# Patient Record
Sex: FEMALE | Race: WHITE | NOT HISPANIC OR LATINO | Employment: OTHER | URBAN - METROPOLITAN AREA
[De-identification: names, ages, dates, MRNs, and addresses within clinical notes are randomized per-mention and may not be internally consistent; named-entity substitution may affect disease eponyms.]

---

## 2017-01-13 ENCOUNTER — CONVERSION ENCOUNTER (OUTPATIENT)
Dept: RHEUMATOLOGY | Facility: CLINIC | Age: 57
End: 2017-01-13

## 2017-01-20 ENCOUNTER — CONVERSION ENCOUNTER (OUTPATIENT)
Dept: RHEUMATOLOGY | Facility: CLINIC | Age: 57
End: 2017-01-20

## 2017-01-20 LAB
ALBUMIN SERPL-MCNC: 3.9 G/DL (ref 3.6–5.1)
ALBUMIN/GLOB SERPL: ABNORMAL {RATIO} (ref 1–2.5)
ALP SERPL-CCNC: 99 UNITS/L (ref 33–130)
ALT SERPL-CCNC: 8 UNITS/L (ref 6–29)
AST SERPL-CCNC: 16 UNITS/L (ref 10–35)
BILIRUB SERPL-MCNC: 0.3 MG/DL (ref 0.2–1.2)
BUN SERPL-MCNC: 12 MG/DL (ref 7–25)
BUN/CREAT SERPL: ABNORMAL (ref 6–22)
CALCIUM SERPL-MCNC: 9.6 MG/DL (ref 8.6–10.4)
CHLORIDE SERPL-SCNC: 104 MMOL/L (ref 98–110)
CO2 CONTENT VENOUS: 24 MMOL/L (ref 20–31)
CONV TOTAL PROTEIN: 7.5 G/DL (ref 6.1–8.1)
CREAT UR-MCNC: 0.88 MG/DL (ref 0.5–1.05)
CRP SERPL-MCNC: 2.91 MG/DL
CYCLIC CITRULLINATED PEPTIDE ANTIBODY: ABNORMAL
ERYTHROCYTE [DISTWIDTH] IN BLOOD BY AUTOMATED COUNT: 16.1 % (ref 11–15)
ERYTHROCYTE [SEDIMENTATION RATE] IN BLOOD BY WESTERGREN METHOD: 36 MM/HR
GLOBULIN UR ELPH-MCNC: ABNORMAL G/DL (ref 1.9–3.7)
GLUCOSE SERPL-MCNC: 126 MG/DL (ref 65–99)
HCT VFR BLD AUTO: 43.7 % (ref 35–45)
HGB BLD-MCNC: 14.3 G/DL (ref 11.7–15.5)
MCH RBC QN AUTO: 27.2 PG (ref 27–33)
MCHC RBC AUTO-ENTMCNC: ABNORMAL % (ref 32–36)
MCV RBC AUTO: 83.3 FL (ref 80–100)
PLATELET # BLD AUTO: ABNORMAL 10*3/MM3 (ref 140–400)
PMV BLD AUTO: 9.3 FL (ref 7.5–11.5)
POTASSIUM SERPL-SCNC: 4.2 MMOL/L (ref 3.5–5.3)
RBC # BLD AUTO: ABNORMAL 10*6/MM3 (ref 3.8–5.1)
SODIUM SERPL-SCNC: 138 MMOL/L (ref 135–146)
WBC # BLD AUTO: ABNORMAL K/UL (ref 3.8–10.8)

## 2017-01-22 ENCOUNTER — APPOINTMENT (OUTPATIENT)
Dept: GENERAL RADIOLOGY | Facility: HOSPITAL | Age: 57
End: 2017-01-22

## 2017-01-22 ENCOUNTER — HOSPITAL ENCOUNTER (INPATIENT)
Facility: HOSPITAL | Age: 57
LOS: 5 days | Discharge: HOME | End: 2017-01-27
Attending: FAMILY MEDICINE | Admitting: INTERNAL MEDICINE

## 2017-01-22 DIAGNOSIS — I50.1 PULMONARY EDEMA CARDIAC CAUSE (HCC): Primary | ICD-10-CM

## 2017-01-22 LAB
ACETONE BLD QL: NEGATIVE
ALBUMIN SERPL-MCNC: 3.6 G/DL (ref 3.5–5.2)
ALBUMIN SERPL-MCNC: 3.6 G/DL (ref 3.5–5.2)
ALBUMIN SERPL-MCNC: 3.7 G/DL (ref 3.5–5.2)
ALBUMIN/GLOB SERPL: 0.9 G/DL
ALBUMIN/GLOB SERPL: 1 G/DL
ALP SERPL-CCNC: 101 U/L (ref 39–117)
ALP SERPL-CCNC: 104 U/L (ref 39–117)
ALT SERPL W P-5'-P-CCNC: 15 U/L (ref 1–33)
ALT SERPL W P-5'-P-CCNC: 18 U/L (ref 1–33)
ANION GAP SERPL CALCULATED.3IONS-SCNC: 15.2 MMOL/L
ANION GAP SERPL CALCULATED.3IONS-SCNC: 15.2 MMOL/L
ANION GAP SERPL CALCULATED.3IONS-SCNC: 21.6 MMOL/L
ARTERIAL PATENCY WRIST A: POSITIVE
ARTERIAL PATENCY WRIST A: POSITIVE
AST SERPL-CCNC: 30 U/L (ref 1–32)
AST SERPL-CCNC: 44 U/L (ref 1–32)
ATMOSPHERIC PRESS: 738.7 MMHG
ATMOSPHERIC PRESS: 740.5 MMHG
BASE EXCESS BLDA CALC-SCNC: -4.5 MMOL/L (ref 0–2)
BASE EXCESS BLDA CALC-SCNC: -7.3 MMOL/L (ref 0–2)
BASOPHILS # BLD AUTO: 0.02 10*3/MM3 (ref 0–0.2)
BASOPHILS NFR BLD AUTO: 0.1 % (ref 0–1.5)
BDY SITE: ABNORMAL
BDY SITE: ABNORMAL
BILIRUB SERPL-MCNC: 0.3 MG/DL (ref 0.1–1.2)
BILIRUB SERPL-MCNC: 0.3 MG/DL (ref 0.1–1.2)
BUN BLD-MCNC: 8 MG/DL (ref 6–20)
BUN/CREAT SERPL: 7.4 (ref 7–25)
BUN/CREAT SERPL: 9.1 (ref 7–25)
BUN/CREAT SERPL: 9.1 (ref 7–25)
CALCIUM SPEC-SCNC: 8.5 MG/DL (ref 8.6–10.5)
CALCIUM SPEC-SCNC: 9.1 MG/DL (ref 8.6–10.5)
CALCIUM SPEC-SCNC: 9.1 MG/DL (ref 8.6–10.5)
CHLORIDE SERPL-SCNC: 100 MMOL/L (ref 98–107)
CHLORIDE SERPL-SCNC: 101 MMOL/L (ref 98–107)
CHLORIDE SERPL-SCNC: 101 MMOL/L (ref 98–107)
CO2 SERPL-SCNC: 17.4 MMOL/L (ref 22–29)
CO2 SERPL-SCNC: 21.8 MMOL/L (ref 22–29)
CO2 SERPL-SCNC: 21.8 MMOL/L (ref 22–29)
CREAT BLD-MCNC: 0.88 MG/DL (ref 0.57–1)
CREAT BLD-MCNC: 0.88 MG/DL (ref 0.57–1)
CREAT BLD-MCNC: 1.08 MG/DL (ref 0.57–1)
D-LACTATE SERPL-SCNC: 1.5 MMOL/L (ref 0.5–2)
D-LACTATE SERPL-SCNC: 2.1 MMOL/L (ref 0.5–2)
D-LACTATE SERPL-SCNC: 5.7 MMOL/L (ref 0.5–2)
DEPRECATED RDW RBC AUTO: 45.6 FL (ref 37–54)
DEPRECATED RDW RBC AUTO: 49.2 FL (ref 37–54)
EOSINOPHIL # BLD AUTO: 0 10*3/MM3 (ref 0–0.7)
EOSINOPHIL NFR BLD AUTO: 0 % (ref 0.3–6.2)
ERYTHROCYTE [DISTWIDTH] IN BLOOD BY AUTOMATED COUNT: 15 % (ref 11.7–13)
ERYTHROCYTE [DISTWIDTH] IN BLOOD BY AUTOMATED COUNT: 15.2 % (ref 11.7–13)
GFR SERPL CREATININE-BSD FRML MDRD: 53 ML/MIN/1.73
GFR SERPL CREATININE-BSD FRML MDRD: 67 ML/MIN/1.73
GFR SERPL CREATININE-BSD FRML MDRD: 67 ML/MIN/1.73
GLOBULIN UR ELPH-MCNC: 3.7 GM/DL
GLOBULIN UR ELPH-MCNC: 3.9 GM/DL
GLUCOSE BLD-MCNC: 183 MG/DL (ref 65–99)
GLUCOSE BLD-MCNC: 183 MG/DL (ref 65–99)
GLUCOSE BLD-MCNC: 378 MG/DL (ref 65–99)
GLUCOSE BLDC GLUCOMTR-MCNC: 164 MG/DL (ref 70–130)
GLUCOSE BLDC GLUCOMTR-MCNC: 177 MG/DL (ref 70–130)
GLUCOSE BLDC GLUCOMTR-MCNC: 244 MG/DL (ref 70–130)
HCG SERPL QL: NEGATIVE
HCO3 BLDA-SCNC: 19.4 MMOL/L (ref 22–28)
HCO3 BLDA-SCNC: 20.8 MMOL/L (ref 22–28)
HCT VFR BLD AUTO: 41.6 % (ref 35.6–45.5)
HCT VFR BLD AUTO: 47.3 % (ref 35.6–45.5)
HGB BLD-MCNC: 13.6 G/DL (ref 11.9–15.5)
HGB BLD-MCNC: 14.4 G/DL (ref 11.9–15.5)
HOLD SPECIMEN: NORMAL
HOLD SPECIMEN: NORMAL
HOROWITZ INDEX BLD+IHG-RTO: 100 %
HOROWITZ INDEX BLD+IHG-RTO: 50 %
IMM GRANULOCYTES # BLD: 0.05 10*3/MM3 (ref 0–0.03)
IMM GRANULOCYTES NFR BLD: 0.3 % (ref 0–0.5)
INR PPP: 2.13 (ref 0.9–1.1)
LARGE PLATELETS: ABNORMAL
LYMPHOCYTES # BLD AUTO: 1.68 10*3/MM3 (ref 0.9–4.8)
LYMPHOCYTES # BLD MANUAL: 7.43 10*3/MM3 (ref 0.9–4.8)
LYMPHOCYTES NFR BLD AUTO: 10.7 % (ref 19.6–45.3)
LYMPHOCYTES NFR BLD MANUAL: 36 % (ref 19.6–45.3)
LYMPHOCYTES NFR BLD MANUAL: 8 % (ref 5–12)
MAGNESIUM SERPL-MCNC: 2.4 MG/DL (ref 1.6–2.6)
MCH RBC QN AUTO: 27.1 PG (ref 26.9–32)
MCH RBC QN AUTO: 27.2 PG (ref 26.9–32)
MCHC RBC AUTO-ENTMCNC: 30.4 G/DL (ref 32.4–36.3)
MCHC RBC AUTO-ENTMCNC: 32.7 G/DL (ref 32.4–36.3)
MCV RBC AUTO: 83 FL (ref 80.5–98.2)
MCV RBC AUTO: 89.4 FL (ref 80.5–98.2)
METAMYELOCYTES NFR BLD MANUAL: 1 % (ref 0–0)
MODALITY: ABNORMAL
MODALITY: ABNORMAL
MONOCYTES # BLD AUTO: 1.14 10*3/MM3 (ref 0.2–1.2)
MONOCYTES # BLD AUTO: 1.65 10*3/MM3 (ref 0.2–1.2)
MONOCYTES NFR BLD AUTO: 7.3 % (ref 5–12)
NEUTROPHILS # BLD AUTO: 11.36 10*3/MM3 (ref 1.9–8.1)
NEUTROPHILS # BLD AUTO: 12.78 10*3/MM3 (ref 1.9–8.1)
NEUTROPHILS NFR BLD AUTO: 81.6 % (ref 42.7–76)
NEUTROPHILS NFR BLD MANUAL: 55 % (ref 42.7–76)
NRBC BLD MANUAL-RTO: 0 /100 WBC (ref 0–0)
NT-PROBNP SERPL-MCNC: 412.6 PG/ML (ref 5–900)
O2 A-A PPRESDIFF RESPIRATORY: 0.6 MMHG
O2 A-A PPRESDIFF RESPIRATORY: 0.7 MMHG
PCO2 BLDA: 38.4 MM HG (ref 35–45)
PCO2 BLDA: 42.7 MM HG (ref 35–45)
PH BLDA: 7.27 PH UNITS (ref 7.35–7.45)
PH BLDA: 7.34 PH UNITS (ref 7.35–7.45)
PHOSPHATE SERPL-MCNC: 3.4 MG/DL (ref 2.5–4.5)
PLATELET # BLD AUTO: 258 10*3/MM3 (ref 140–500)
PLATELET # BLD AUTO: 431 10*3/MM3 (ref 140–500)
PMV BLD AUTO: 10.4 FL (ref 6–12)
PMV BLD AUTO: 10.9 FL (ref 6–12)
PO2 BLDA: 219.8 MM HG (ref 80–100)
PO2 BLDA: 391.1 MM HG (ref 80–100)
POTASSIUM BLD-SCNC: 3.2 MMOL/L (ref 3.5–5.2)
POTASSIUM BLD-SCNC: 3.2 MMOL/L (ref 3.5–5.2)
POTASSIUM BLD-SCNC: 3.6 MMOL/L (ref 3.5–5.2)
PROCALCITONIN SERPL-MCNC: 0.07 NG/ML (ref 0.1–0.25)
PROT SERPL-MCNC: 7.4 G/DL (ref 6–8.5)
PROT SERPL-MCNC: 7.5 G/DL (ref 6–8.5)
PROTHROMBIN TIME: 23.1 SECONDS (ref 11.7–14.2)
RBC # BLD AUTO: 5.01 10*6/MM3 (ref 3.9–5.2)
RBC # BLD AUTO: 5.29 10*6/MM3 (ref 3.9–5.2)
RBC MORPH BLD: NORMAL
SAO2 % BLDCOA: 99.7 % (ref 92–99)
SAO2 % BLDCOA: 99.9 % (ref 92–99)
SCAN SLIDE: NORMAL
SET MECH RESP RATE: 16
SET MECH RESP RATE: 16
SODIUM BLD-SCNC: 138 MMOL/L (ref 136–145)
SODIUM BLD-SCNC: 138 MMOL/L (ref 136–145)
SODIUM BLD-SCNC: 139 MMOL/L (ref 136–145)
TOTAL RATE: 27 BREATHS/MINUTE
TOTAL RATE: 28 BREATHS/MINUTE
TROPONIN T SERPL-MCNC: 0.25 NG/ML (ref 0–0.03)
TROPONIN T SERPL-MCNC: <0.01 NG/ML (ref 0–0.03)
VT ON VENT VENT: 472 ML
VT ON VENT VENT: 669 ML
WBC MORPH BLD: NORMAL
WBC NRBC COR # BLD: 15.67 10*3/MM3 (ref 4.5–10.7)
WBC NRBC COR # BLD: 20.65 10*3/MM3 (ref 4.5–10.7)
WHOLE BLOOD HOLD SPECIMEN: NORMAL
WHOLE BLOOD HOLD SPECIMEN: NORMAL

## 2017-01-22 PROCEDURE — 80053 COMPREHEN METABOLIC PANEL: CPT | Performed by: FAMILY MEDICINE

## 2017-01-22 PROCEDURE — 85025 COMPLETE CBC W/AUTO DIFF WBC: CPT | Performed by: FAMILY MEDICINE

## 2017-01-22 PROCEDURE — 94660 CPAP INITIATION&MGMT: CPT

## 2017-01-22 PROCEDURE — 94799 UNLISTED PULMONARY SVC/PX: CPT

## 2017-01-22 PROCEDURE — 82803 BLOOD GASES ANY COMBINATION: CPT

## 2017-01-22 PROCEDURE — 25010000002 FUROSEMIDE PER 20 MG: Performed by: INTERNAL MEDICINE

## 2017-01-22 PROCEDURE — 84703 CHORIONIC GONADOTROPIN ASSAY: CPT | Performed by: FAMILY MEDICINE

## 2017-01-22 PROCEDURE — 71010 HC CHEST PA OR AP: CPT

## 2017-01-22 PROCEDURE — 82009 KETONE BODYS QUAL: CPT | Performed by: INTERNAL MEDICINE

## 2017-01-22 PROCEDURE — 84484 ASSAY OF TROPONIN QUANT: CPT | Performed by: INTERNAL MEDICINE

## 2017-01-22 PROCEDURE — 85610 PROTHROMBIN TIME: CPT | Performed by: FAMILY MEDICINE

## 2017-01-22 PROCEDURE — 99223 1ST HOSP IP/OBS HIGH 75: CPT | Performed by: INTERNAL MEDICINE

## 2017-01-22 PROCEDURE — 83605 ASSAY OF LACTIC ACID: CPT | Performed by: INTERNAL MEDICINE

## 2017-01-22 PROCEDURE — 93005 ELECTROCARDIOGRAM TRACING: CPT | Performed by: FAMILY MEDICINE

## 2017-01-22 PROCEDURE — 25010000002 FUROSEMIDE PER 20 MG

## 2017-01-22 PROCEDURE — 80069 RENAL FUNCTION PANEL: CPT | Performed by: INTERNAL MEDICINE

## 2017-01-22 PROCEDURE — 83735 ASSAY OF MAGNESIUM: CPT | Performed by: INTERNAL MEDICINE

## 2017-01-22 PROCEDURE — 87040 BLOOD CULTURE FOR BACTERIA: CPT | Performed by: FAMILY MEDICINE

## 2017-01-22 PROCEDURE — 85007 BL SMEAR W/DIFF WBC COUNT: CPT | Performed by: FAMILY MEDICINE

## 2017-01-22 PROCEDURE — 93005 ELECTROCARDIOGRAM TRACING: CPT | Performed by: INTERNAL MEDICINE

## 2017-01-22 PROCEDURE — 99285 EMERGENCY DEPT VISIT HI MDM: CPT

## 2017-01-22 PROCEDURE — 36600 WITHDRAWAL OF ARTERIAL BLOOD: CPT

## 2017-01-22 PROCEDURE — 83880 ASSAY OF NATRIURETIC PEPTIDE: CPT | Performed by: FAMILY MEDICINE

## 2017-01-22 PROCEDURE — 80053 COMPREHEN METABOLIC PANEL: CPT | Performed by: INTERNAL MEDICINE

## 2017-01-22 PROCEDURE — 84484 ASSAY OF TROPONIN QUANT: CPT | Performed by: FAMILY MEDICINE

## 2017-01-22 PROCEDURE — 36415 COLL VENOUS BLD VENIPUNCTURE: CPT | Performed by: FAMILY MEDICINE

## 2017-01-22 PROCEDURE — 93010 ELECTROCARDIOGRAM REPORT: CPT | Performed by: INTERNAL MEDICINE

## 2017-01-22 PROCEDURE — 83605 ASSAY OF LACTIC ACID: CPT | Performed by: FAMILY MEDICINE

## 2017-01-22 PROCEDURE — 84145 PROCALCITONIN (PCT): CPT | Performed by: FAMILY MEDICINE

## 2017-01-22 PROCEDURE — 85025 COMPLETE CBC W/AUTO DIFF WBC: CPT | Performed by: INTERNAL MEDICINE

## 2017-01-22 PROCEDURE — 82962 GLUCOSE BLOOD TEST: CPT

## 2017-01-22 RX ORDER — FUROSEMIDE 10 MG/ML
INJECTION INTRAMUSCULAR; INTRAVENOUS
Status: COMPLETED
Start: 2017-01-22 | End: 2017-01-22

## 2017-01-22 RX ORDER — FUROSEMIDE 10 MG/ML
40 INJECTION INTRAMUSCULAR; INTRAVENOUS 2 TIMES DAILY
Status: DISCONTINUED | OUTPATIENT
Start: 2017-01-22 | End: 2017-01-24

## 2017-01-22 RX ORDER — ALBUTEROL SULFATE 2.5 MG/3ML
2.5 SOLUTION RESPIRATORY (INHALATION) EVERY 4 HOURS PRN
Status: DISCONTINUED | OUTPATIENT
Start: 2017-01-22 | End: 2017-01-27 | Stop reason: HOSPADM

## 2017-01-22 RX ORDER — WARFARIN SODIUM 3 MG/1
3 TABLET ORAL EVERY MORNING
COMMUNITY
Start: 2015-11-28 | End: 2020-02-17

## 2017-01-22 RX ORDER — BENZONATATE 100 MG/1
100 CAPSULE ORAL 2 TIMES DAILY
COMMUNITY

## 2017-01-22 RX ORDER — METOCLOPRAMIDE 10 MG/1
10 TABLET ORAL 2 TIMES DAILY
Status: DISCONTINUED | OUTPATIENT
Start: 2017-01-22 | End: 2017-01-27 | Stop reason: HOSPADM

## 2017-01-22 RX ORDER — POTASSIUM CHLORIDE 750 MG/1
10 CAPSULE, EXTENDED RELEASE ORAL DAILY
Status: DISCONTINUED | OUTPATIENT
Start: 2017-01-22 | End: 2017-01-27 | Stop reason: HOSPADM

## 2017-01-22 RX ORDER — METHOCARBAMOL 500 MG/1
500 TABLET, FILM COATED ORAL 3 TIMES DAILY
Status: DISCONTINUED | OUTPATIENT
Start: 2017-01-22 | End: 2017-01-27 | Stop reason: HOSPADM

## 2017-01-22 RX ORDER — PAROXETINE 10 MG/1
10 TABLET, FILM COATED ORAL NIGHTLY
COMMUNITY
End: 2020-02-17

## 2017-01-22 RX ORDER — ZOLPIDEM TARTRATE 10 MG/1
10 TABLET ORAL DAILY
COMMUNITY

## 2017-01-22 RX ORDER — ASPIRIN 81 MG/1
81 TABLET, CHEWABLE ORAL DAILY
Status: DISCONTINUED | OUTPATIENT
Start: 2017-01-22 | End: 2017-01-27 | Stop reason: HOSPADM

## 2017-01-22 RX ORDER — HYDROCODONE BITARTRATE AND ACETAMINOPHEN 7.5; 325 MG/1; MG/1
1 TABLET ORAL EVERY 8 HOURS PRN
COMMUNITY

## 2017-01-22 RX ORDER — POTASSIUM CHLORIDE 750 MG/1
10 TABLET, FILM COATED, EXTENDED RELEASE ORAL
COMMUNITY

## 2017-01-22 RX ORDER — SULFASALAZINE 500 MG/1
500 TABLET ORAL 2 TIMES DAILY
Status: DISCONTINUED | OUTPATIENT
Start: 2017-01-22 | End: 2017-01-27 | Stop reason: HOSPADM

## 2017-01-22 RX ORDER — HYDROCODONE BITARTRATE AND ACETAMINOPHEN 7.5; 325 MG/1; MG/1
1 TABLET ORAL EVERY 8 HOURS PRN
Status: DISCONTINUED | OUTPATIENT
Start: 2017-01-22 | End: 2017-01-27 | Stop reason: HOSPADM

## 2017-01-22 RX ORDER — SUCCINYLCHOLINE CHLORIDE 20 MG/ML
1.5 INJECTION INTRAMUSCULAR; INTRAVENOUS ONCE
Status: DISCONTINUED | OUTPATIENT
Start: 2017-01-22 | End: 2017-01-22

## 2017-01-22 RX ORDER — SUCRALFATE 1 G/1
1 TABLET ORAL 3 TIMES DAILY
COMMUNITY
End: 2020-02-17

## 2017-01-22 RX ORDER — PROMETHAZINE HYDROCHLORIDE AND CODEINE PHOSPHATE 6.25; 1 MG/5ML; MG/5ML
5 SYRUP ORAL EVERY 4 HOURS PRN
COMMUNITY
End: 2020-02-17

## 2017-01-22 RX ORDER — SULFASALAZINE 500 MG/1
500 TABLET ORAL 2 TIMES DAILY
COMMUNITY
End: 2020-02-17

## 2017-01-22 RX ORDER — OMEPRAZOLE 40 MG/1
40 CAPSULE, DELAYED RELEASE ORAL
COMMUNITY

## 2017-01-22 RX ORDER — NITROGLYCERIN 20 MG/100ML
INJECTION INTRAVENOUS
Status: DISPENSED
Start: 2017-01-22 | End: 2017-01-22

## 2017-01-22 RX ORDER — FUROSEMIDE 10 MG/ML
40 INJECTION INTRAMUSCULAR; INTRAVENOUS ONCE
Status: COMPLETED | OUTPATIENT
Start: 2017-01-22 | End: 2017-01-22

## 2017-01-22 RX ORDER — SODIUM CHLORIDE 0.9 % (FLUSH) 0.9 %
10 SYRINGE (ML) INJECTION AS NEEDED
Status: DISCONTINUED | OUTPATIENT
Start: 2017-01-22 | End: 2017-01-27 | Stop reason: HOSPADM

## 2017-01-22 RX ORDER — POTASSIUM CHLORIDE 600 MG/1
8 TABLET, FILM COATED, EXTENDED RELEASE ORAL 2 TIMES DAILY
COMMUNITY
End: 2020-02-17

## 2017-01-22 RX ORDER — WARFARIN SODIUM 3 MG/1
3 TABLET ORAL EVERY MORNING
Status: DISCONTINUED | OUTPATIENT
Start: 2017-01-22 | End: 2017-01-23

## 2017-01-22 RX ORDER — FUROSEMIDE 20 MG/1
20 TABLET ORAL DAILY PRN
COMMUNITY
End: 2020-02-17

## 2017-01-22 RX ORDER — METHOCARBAMOL 500 MG/1
500 TABLET, FILM COATED ORAL 3 TIMES DAILY
COMMUNITY
End: 2020-02-17

## 2017-01-22 RX ORDER — NITROGLYCERIN 0.4 MG/1
TABLET SUBLINGUAL
Status: DISCONTINUED
Start: 2017-01-22 | End: 2017-01-22 | Stop reason: WASHOUT

## 2017-01-22 RX ORDER — FUROSEMIDE 10 MG/ML
INJECTION INTRAMUSCULAR; INTRAVENOUS
Status: DISCONTINUED
Start: 2017-01-22 | End: 2017-01-22 | Stop reason: WASHOUT

## 2017-01-22 RX ORDER — POTASSIUM CHLORIDE 750 MG/1
40 CAPSULE, EXTENDED RELEASE ORAL AS NEEDED
Status: DISCONTINUED | OUTPATIENT
Start: 2017-01-22 | End: 2017-01-27 | Stop reason: HOSPADM

## 2017-01-22 RX ORDER — ALBUTEROL SULFATE 2.5 MG/3ML
2.5 SOLUTION RESPIRATORY (INHALATION) EVERY 4 HOURS PRN
COMMUNITY

## 2017-01-22 RX ORDER — LISINOPRIL 5 MG/1
5 TABLET ORAL
COMMUNITY

## 2017-01-22 RX ORDER — CHOLECALCIFEROL (VITAMIN D3) 125 MCG
500 CAPSULE ORAL DAILY
Status: DISCONTINUED | OUTPATIENT
Start: 2017-01-22 | End: 2017-01-27 | Stop reason: HOSPADM

## 2017-01-22 RX ORDER — SUCRALFATE 1 G/1
1 TABLET ORAL 3 TIMES DAILY
Status: DISCONTINUED | OUTPATIENT
Start: 2017-01-22 | End: 2017-01-27 | Stop reason: HOSPADM

## 2017-01-22 RX ORDER — POTASSIUM CHLORIDE 1.5 G/1.77G
40 POWDER, FOR SOLUTION ORAL AS NEEDED
Status: DISCONTINUED | OUTPATIENT
Start: 2017-01-22 | End: 2017-01-27 | Stop reason: HOSPADM

## 2017-01-22 RX ORDER — METOCLOPRAMIDE 10 MG/1
10 TABLET ORAL 2 TIMES DAILY
COMMUNITY
End: 2020-02-17

## 2017-01-22 RX ORDER — PAROXETINE 10 MG/1
10 TABLET, FILM COATED ORAL NIGHTLY
Status: DISCONTINUED | OUTPATIENT
Start: 2017-01-22 | End: 2017-01-27 | Stop reason: HOSPADM

## 2017-01-22 RX ORDER — ATORVASTATIN CALCIUM 10 MG/1
10 TABLET, FILM COATED ORAL DAILY
Status: DISCONTINUED | OUTPATIENT
Start: 2017-01-22 | End: 2017-01-27 | Stop reason: HOSPADM

## 2017-01-22 RX ADMIN — FUROSEMIDE 40 MG: 10 INJECTION, SOLUTION INTRAMUSCULAR; INTRAVENOUS at 01:59

## 2017-01-22 RX ADMIN — FUROSEMIDE 40 MG: 10 INJECTION INTRAMUSCULAR; INTRAVENOUS at 01:59

## 2017-01-22 RX ADMIN — WARFARIN SODIUM 3 MG: 3 TABLET ORAL at 10:54

## 2017-01-22 RX ADMIN — ATORVASTATIN CALCIUM 10 MG: 10 TABLET, FILM COATED ORAL at 10:56

## 2017-01-22 RX ADMIN — Medication 500 MCG: at 10:55

## 2017-01-22 RX ADMIN — POTASSIUM CHLORIDE 40 MEQ: 1.5 POWDER, FOR SOLUTION ORAL at 13:25

## 2017-01-22 RX ADMIN — METOCLOPRAMIDE 10 MG: 10 TABLET ORAL at 10:55

## 2017-01-22 RX ADMIN — FUROSEMIDE 40 MG: 10 INJECTION, SOLUTION INTRAMUSCULAR; INTRAVENOUS at 13:26

## 2017-01-22 RX ADMIN — SUCRALFATE 1 G: 1 TABLET ORAL at 17:31

## 2017-01-22 RX ADMIN — SULFASALAZINE 500 MG: 500 TABLET ORAL at 19:19

## 2017-01-22 RX ADMIN — SULFASALAZINE 500 MG: 500 TABLET ORAL at 10:55

## 2017-01-22 RX ADMIN — ASPIRIN 81 MG: 81 TABLET, CHEWABLE ORAL at 10:56

## 2017-01-22 RX ADMIN — POTASSIUM CHLORIDE 40 MEQ: 1.5 POWDER, FOR SOLUTION ORAL at 19:19

## 2017-01-22 RX ADMIN — PAROXETINE HYDROCHLORIDE 10 MG: 10 TABLET, FILM COATED ORAL at 20:13

## 2017-01-22 RX ADMIN — METHOCARBAMOL 500 MG: 500 TABLET ORAL at 20:13

## 2017-01-22 RX ADMIN — METOCLOPRAMIDE 10 MG: 10 TABLET ORAL at 19:19

## 2017-01-22 RX ADMIN — SUCRALFATE 1 G: 1 TABLET ORAL at 20:13

## 2017-01-22 RX ADMIN — METHOCARBAMOL 500 MG: 500 TABLET ORAL at 10:55

## 2017-01-22 RX ADMIN — POTASSIUM CHLORIDE 10 MEQ: 750 CAPSULE, EXTENDED RELEASE ORAL at 10:56

## 2017-01-22 RX ADMIN — SUCRALFATE 1 G: 1 TABLET ORAL at 10:55

## 2017-01-22 RX ADMIN — Medication 0.22 MCG/KG/MIN: at 02:19

## 2017-01-23 ENCOUNTER — APPOINTMENT (OUTPATIENT)
Dept: CARDIOLOGY | Facility: HOSPITAL | Age: 57
End: 2017-01-23
Attending: INTERNAL MEDICINE

## 2017-01-23 ENCOUNTER — APPOINTMENT (OUTPATIENT)
Dept: GENERAL RADIOLOGY | Facility: HOSPITAL | Age: 57
End: 2017-01-23
Attending: INTERNAL MEDICINE

## 2017-01-23 PROBLEM — R00.0 WIDE-COMPLEX TACHYCARDIA: Status: ACTIVE | Noted: 2017-01-23

## 2017-01-23 PROBLEM — I42.8 NONISCHEMIC CARDIOMYOPATHY (HCC): Status: ACTIVE | Noted: 2017-01-23

## 2017-01-23 PROBLEM — Z95.2 H/O MITRAL VALVE REPLACEMENT WITH MECHANICAL VALVE: Status: ACTIVE | Noted: 2017-01-23

## 2017-01-23 LAB
ALBUMIN SERPL-MCNC: 3.6 G/DL (ref 3.5–5.2)
ANION GAP SERPL CALCULATED.3IONS-SCNC: 15.5 MMOL/L
AORTIC ARCH: 2.5 CM
ASCENDING AORTA: 2.9 CM
BASOPHILS # BLD AUTO: 0.1 10*3/MM3 (ref 0–0.2)
BASOPHILS NFR BLD AUTO: 0.9 % (ref 0–1.5)
BH CV ECHO MEAS - ACS: 1.6 CM
BH CV ECHO MEAS - AO MEAN PG (FULL): 2 MMHG
BH CV ECHO MEAS - AO MEAN PG: 3 MMHG
BH CV ECHO MEAS - AO ROOT AREA (BSA CORRECTED): 1.5
BH CV ECHO MEAS - AO ROOT AREA: 5.7 CM^2
BH CV ECHO MEAS - AO ROOT DIAM: 2.7 CM
BH CV ECHO MEAS - AO V2 MAX: 122 CM/SEC
BH CV ECHO MEAS - AO V2 MEAN: 85.4 CM/SEC
BH CV ECHO MEAS - AO V2 VTI: 18.5 CM
BH CV ECHO MEAS - ASC AORTA: 2.9 CM
BH CV ECHO MEAS - AVA(I,A): 1.8 CM^2
BH CV ECHO MEAS - AVA(I,D): 1.8 CM^2
BH CV ECHO MEAS - BSA(HAYCOCK): 1.9 M^2
BH CV ECHO MEAS - BSA: 1.8 M^2
BH CV ECHO MEAS - BZI_BMI: 31.1 KILOGRAMS/M^2
BH CV ECHO MEAS - BZI_METRIC_HEIGHT: 157.5 CM
BH CV ECHO MEAS - BZI_METRIC_WEIGHT: 77.1 KG
BH CV ECHO MEAS - CONTRAST EF (2CH): 24.3 ML/M^2
BH CV ECHO MEAS - CONTRAST EF 4CH: 25.8 ML/M^2
BH CV ECHO MEAS - EDV(CUBED): 140.6 ML
BH CV ECHO MEAS - EDV(MOD-SP2): 103 ML
BH CV ECHO MEAS - EDV(MOD-SP4): 132 ML
BH CV ECHO MEAS - EDV(TEICH): 129.5 ML
BH CV ECHO MEAS - EF(CUBED): 21.3 %
BH CV ECHO MEAS - EF(MOD-SP2): 24.3 %
BH CV ECHO MEAS - EF(MOD-SP4): 25.8 %
BH CV ECHO MEAS - EF(TEICH): 17 %
BH CV ECHO MEAS - ESV(CUBED): 110.6 ML
BH CV ECHO MEAS - ESV(MOD-SP2): 78 ML
BH CV ECHO MEAS - ESV(MOD-SP4): 98 ML
BH CV ECHO MEAS - ESV(TEICH): 107.5 ML
BH CV ECHO MEAS - FS: 7.7 %
BH CV ECHO MEAS - IVS/LVPW: 0.85
BH CV ECHO MEAS - IVSD: 1.1 CM
BH CV ECHO MEAS - LAT PEAK E' VEL: 5 CM/SEC
BH CV ECHO MEAS - LV DIASTOLIC VOL/BSA (35-75): 74 ML/M^2
BH CV ECHO MEAS - LV MASS(C)D: 248.8 GRAMS
BH CV ECHO MEAS - LV MASS(C)DI: 139.5 GRAMS/M^2
BH CV ECHO MEAS - LV MEAN PG: 1 MMHG
BH CV ECHO MEAS - LV SYSTOLIC VOL/BSA (12-30): 54.9 ML/M^2
BH CV ECHO MEAS - LV V1 MAX: 68 CM/SEC
BH CV ECHO MEAS - LV V1 MEAN: 48.8 CM/SEC
BH CV ECHO MEAS - LV V1 VTI: 10.8 CM
BH CV ECHO MEAS - LVIDD: 5.2 CM
BH CV ECHO MEAS - LVIDS: 4.8 CM
BH CV ECHO MEAS - LVLD AP2: 7.4 CM
BH CV ECHO MEAS - LVLD AP4: 8.2 CM
BH CV ECHO MEAS - LVLS AP2: 6.4 CM
BH CV ECHO MEAS - LVLS AP4: 7.7 CM
BH CV ECHO MEAS - LVOT AREA (M): 3.1 CM^2
BH CV ECHO MEAS - LVOT AREA: 3.1 CM^2
BH CV ECHO MEAS - LVOT DIAM: 2 CM
BH CV ECHO MEAS - LVPWD: 1.3 CM
BH CV ECHO MEAS - MED PEAK E' VEL: 5 CM/SEC
BH CV ECHO MEAS - MV A DUR: 0.1 SEC
BH CV ECHO MEAS - MV A MAX VEL: 119 CM/SEC
BH CV ECHO MEAS - MV DEC SLOPE: 321 CM/SEC^2
BH CV ECHO MEAS - MV DEC TIME: 0.22 SEC
BH CV ECHO MEAS - MV E MAX VEL: 105 CM/SEC
BH CV ECHO MEAS - MV E/A: 0.88
BH CV ECHO MEAS - MV MAX PG: 5 MMHG
BH CV ECHO MEAS - MV MEAN PG: 3 MMHG
BH CV ECHO MEAS - MV P1/2T MAX VEL: 108 CM/SEC
BH CV ECHO MEAS - MV P1/2T: 98.5 MSEC
BH CV ECHO MEAS - MV V2 MEAN: 87.6 CM/SEC
BH CV ECHO MEAS - MV V2 VTI: 25.3 CM
BH CV ECHO MEAS - MVA P1/2T LCG: 2 CM^2
BH CV ECHO MEAS - MVA(P1/2T): 2.2 CM^2
BH CV ECHO MEAS - MVA(VTI): 1.3 CM^2
BH CV ECHO MEAS - PA ACC SLOPE: 28.6 CM/SEC^2
BH CV ECHO MEAS - PA ACC TIME: 0.1 SEC
BH CV ECHO MEAS - PA MAX PG (FULL): 2.5 MMHG
BH CV ECHO MEAS - PA MAX PG: 3.9 MMHG
BH CV ECHO MEAS - PA PR(ACCEL): 36.3 MMHG
BH CV ECHO MEAS - PA V2 MAX: 99.1 CM/SEC
BH CV ECHO MEAS - PVA(V,A): 2.7 CM^2
BH CV ECHO MEAS - PVA(V,D): 2.7 CM^2
BH CV ECHO MEAS - QP/QS: 1.2
BH CV ECHO MEAS - RV MAX PG: 1.4 MMHG
BH CV ECHO MEAS - RV MEAN PG: 1 MMHG
BH CV ECHO MEAS - RV V1 MAX: 60.2 CM/SEC
BH CV ECHO MEAS - RV V1 MEAN: 38 CM/SEC
BH CV ECHO MEAS - RV V1 VTI: 8.7 CM
BH CV ECHO MEAS - RVOT AREA: 4.5 CM^2
BH CV ECHO MEAS - RVOT DIAM: 2.4 CM
BH CV ECHO MEAS - SI(AO): 59.4 ML/M^2
BH CV ECHO MEAS - SI(CUBED): 16.8 ML/M^2
BH CV ECHO MEAS - SI(LVOT): 19 ML/M^2
BH CV ECHO MEAS - SI(MOD-SP2): 14 ML/M^2
BH CV ECHO MEAS - SI(MOD-SP4): 19.1 ML/M^2
BH CV ECHO MEAS - SI(TEICH): 12.3 ML/M^2
BH CV ECHO MEAS - SUP REN AO DIAM: 1.6 CM
BH CV ECHO MEAS - SV(AO): 105.9 ML
BH CV ECHO MEAS - SV(CUBED): 30 ML
BH CV ECHO MEAS - SV(LVOT): 33.9 ML
BH CV ECHO MEAS - SV(MOD-SP2): 25 ML
BH CV ECHO MEAS - SV(MOD-SP4): 34 ML
BH CV ECHO MEAS - SV(RVOT): 39.2 ML
BH CV ECHO MEAS - SV(TEICH): 22 ML
BH CV ECHO MEAS - TAPSE (>1.6): 1 CM2
BH CV XLRA - RV BASE: 3.4 CM
BH CV XLRA - TDI S': 7 CM/SEC
BILIRUB UR QL STRIP: NEGATIVE
BUN BLD-MCNC: 6 MG/DL (ref 6–20)
BUN/CREAT SERPL: 7.3 (ref 7–25)
CALCIUM SPEC-SCNC: 9.1 MG/DL (ref 8.6–10.5)
CHLORIDE SERPL-SCNC: 98 MMOL/L (ref 98–107)
CLARITY UR: CLEAR
CO2 SERPL-SCNC: 24.5 MMOL/L (ref 22–29)
COLOR UR: YELLOW
CREAT BLD-MCNC: 0.82 MG/DL (ref 0.57–1)
DEPRECATED RDW RBC AUTO: 48.4 FL (ref 37–54)
E/E' RATIO: 21
EOSINOPHIL # BLD AUTO: 0.08 10*3/MM3 (ref 0–0.7)
EOSINOPHIL NFR BLD AUTO: 0.7 % (ref 0.3–6.2)
ERYTHROCYTE [DISTWIDTH] IN BLOOD BY AUTOMATED COUNT: 15.2 % (ref 11.7–13)
GFR SERPL CREATININE-BSD FRML MDRD: 73 ML/MIN/1.73
GLUCOSE BLD-MCNC: 161 MG/DL (ref 65–99)
GLUCOSE BLDC GLUCOMTR-MCNC: 138 MG/DL (ref 70–130)
GLUCOSE BLDC GLUCOMTR-MCNC: 139 MG/DL (ref 70–130)
GLUCOSE BLDC GLUCOMTR-MCNC: 171 MG/DL (ref 70–130)
GLUCOSE UR STRIP-MCNC: NEGATIVE MG/DL
HCT VFR BLD AUTO: 44.7 % (ref 35.6–45.5)
HGB BLD-MCNC: 14.2 G/DL (ref 11.9–15.5)
HGB UR QL STRIP.AUTO: NEGATIVE
IMM GRANULOCYTES # BLD: 0.03 10*3/MM3 (ref 0–0.03)
IMM GRANULOCYTES NFR BLD: 0.3 % (ref 0–0.5)
INR PPP: 2.45 (ref 0.9–1.1)
KETONES UR QL STRIP: NEGATIVE
LEFT ATRIUM VOLUME INDEX: 36 ML/M2
LEUKOCYTE ESTERASE UR QL STRIP.AUTO: NEGATIVE
LYMPHOCYTES # BLD AUTO: 2.51 10*3/MM3 (ref 0.9–4.8)
LYMPHOCYTES NFR BLD AUTO: 22.1 % (ref 19.6–45.3)
MCH RBC QN AUTO: 27.5 PG (ref 26.9–32)
MCHC RBC AUTO-ENTMCNC: 31.8 G/DL (ref 32.4–36.3)
MCV RBC AUTO: 86.6 FL (ref 80.5–98.2)
MONOCYTES # BLD AUTO: 1.21 10*3/MM3 (ref 0.2–1.2)
MONOCYTES NFR BLD AUTO: 10.6 % (ref 5–12)
NEUTROPHILS # BLD AUTO: 7.45 10*3/MM3 (ref 1.9–8.1)
NEUTROPHILS NFR BLD AUTO: 65.4 % (ref 42.7–76)
NITRITE UR QL STRIP: NEGATIVE
NRBC BLD MANUAL-RTO: 0 /100 WBC (ref 0–0)
PH UR STRIP.AUTO: <=5 [PH] (ref 5–8)
PHOSPHATE SERPL-MCNC: 2.4 MG/DL (ref 2.5–4.5)
PLATELET # BLD AUTO: 276 10*3/MM3 (ref 140–500)
PMV BLD AUTO: 10.5 FL (ref 6–12)
POTASSIUM BLD-SCNC: 3.4 MMOL/L (ref 3.5–5.2)
PROT UR QL STRIP: NEGATIVE
PROTHROMBIN TIME: 25.8 SECONDS (ref 11.7–14.2)
RBC # BLD AUTO: 5.16 10*6/MM3 (ref 3.9–5.2)
SODIUM BLD-SCNC: 138 MMOL/L (ref 136–145)
SP GR UR STRIP: 1.01 (ref 1–1.03)
TROPONIN T SERPL-MCNC: 0.14 NG/ML (ref 0–0.03)
UROBILINOGEN UR QL STRIP: NORMAL
WBC NRBC COR # BLD: 11.38 10*3/MM3 (ref 4.5–10.7)

## 2017-01-23 PROCEDURE — 99233 SBSQ HOSP IP/OBS HIGH 50: CPT | Performed by: INTERNAL MEDICINE

## 2017-01-23 PROCEDURE — 82962 GLUCOSE BLOOD TEST: CPT

## 2017-01-23 PROCEDURE — 85025 COMPLETE CBC W/AUTO DIFF WBC: CPT | Performed by: INTERNAL MEDICINE

## 2017-01-23 PROCEDURE — 85610 PROTHROMBIN TIME: CPT | Performed by: INTERNAL MEDICINE

## 2017-01-23 PROCEDURE — 84484 ASSAY OF TROPONIN QUANT: CPT | Performed by: INTERNAL MEDICINE

## 2017-01-23 PROCEDURE — 99253 IP/OBS CNSLTJ NEW/EST LOW 45: CPT | Performed by: INTERNAL MEDICINE

## 2017-01-23 PROCEDURE — 25010000002 PERFLUTREN (DEFINITY) 8.476 MG IN SODIUM CHLORIDE 10 ML INJECTION: Performed by: INTERNAL MEDICINE

## 2017-01-23 PROCEDURE — 93005 ELECTROCARDIOGRAM TRACING: CPT | Performed by: INTERNAL MEDICINE

## 2017-01-23 PROCEDURE — 93010 ELECTROCARDIOGRAM REPORT: CPT | Performed by: INTERNAL MEDICINE

## 2017-01-23 PROCEDURE — 71020 HC CHEST PA AND LATERAL: CPT

## 2017-01-23 PROCEDURE — 80069 RENAL FUNCTION PANEL: CPT | Performed by: INTERNAL MEDICINE

## 2017-01-23 PROCEDURE — C8929 TTE W OR WO FOL WCON,DOPPLER: HCPCS

## 2017-01-23 PROCEDURE — 93306 TTE W/DOPPLER COMPLETE: CPT | Performed by: INTERNAL MEDICINE

## 2017-01-23 PROCEDURE — 25010000002 FUROSEMIDE PER 20 MG: Performed by: INTERNAL MEDICINE

## 2017-01-23 PROCEDURE — 94799 UNLISTED PULMONARY SVC/PX: CPT

## 2017-01-23 PROCEDURE — 94640 AIRWAY INHALATION TREATMENT: CPT

## 2017-01-23 PROCEDURE — 81003 URINALYSIS AUTO W/O SCOPE: CPT | Performed by: INTERNAL MEDICINE

## 2017-01-23 RX ORDER — PANTOPRAZOLE SODIUM 40 MG/1
40 TABLET, DELAYED RELEASE ORAL
Status: DISCONTINUED | OUTPATIENT
Start: 2017-01-24 | End: 2017-01-27 | Stop reason: HOSPADM

## 2017-01-23 RX ORDER — PANTOPRAZOLE SODIUM 40 MG/1
40 TABLET, DELAYED RELEASE ORAL
Status: DISCONTINUED | OUTPATIENT
Start: 2017-01-23 | End: 2017-01-23

## 2017-01-23 RX ADMIN — METHOCARBAMOL 500 MG: 500 TABLET ORAL at 16:13

## 2017-01-23 RX ADMIN — SULFASALAZINE 500 MG: 500 TABLET ORAL at 18:10

## 2017-01-23 RX ADMIN — SUCRALFATE 1 G: 1 TABLET ORAL at 20:56

## 2017-01-23 RX ADMIN — FUROSEMIDE 40 MG: 10 INJECTION, SOLUTION INTRAMUSCULAR; INTRAVENOUS at 01:54

## 2017-01-23 RX ADMIN — Medication 500 MCG: at 08:21

## 2017-01-23 RX ADMIN — ASPIRIN 81 MG: 81 TABLET, CHEWABLE ORAL at 08:22

## 2017-01-23 RX ADMIN — PAROXETINE HYDROCHLORIDE 10 MG: 10 TABLET, FILM COATED ORAL at 20:56

## 2017-01-23 RX ADMIN — METHOCARBAMOL 500 MG: 500 TABLET ORAL at 20:56

## 2017-01-23 RX ADMIN — SUCRALFATE 1 G: 1 TABLET ORAL at 16:13

## 2017-01-23 RX ADMIN — METOPROLOL TARTRATE 25 MG: 25 TABLET ORAL at 12:21

## 2017-01-23 RX ADMIN — METHOCARBAMOL 500 MG: 500 TABLET ORAL at 08:21

## 2017-01-23 RX ADMIN — SUCRALFATE 1 G: 1 TABLET ORAL at 08:21

## 2017-01-23 RX ADMIN — WARFARIN SODIUM 3 MG: 3 TABLET ORAL at 06:56

## 2017-01-23 RX ADMIN — METOPROLOL TARTRATE 25 MG: 25 TABLET ORAL at 20:56

## 2017-01-23 RX ADMIN — SULFASALAZINE 500 MG: 500 TABLET ORAL at 08:21

## 2017-01-23 RX ADMIN — FUROSEMIDE 40 MG: 10 INJECTION, SOLUTION INTRAMUSCULAR; INTRAVENOUS at 08:22

## 2017-01-23 RX ADMIN — POTASSIUM CHLORIDE 10 MEQ: 750 CAPSULE, EXTENDED RELEASE ORAL at 08:24

## 2017-01-23 RX ADMIN — TIOTROPIUM BROMIDE 1 CAPSULE: 18 CAPSULE ORAL; RESPIRATORY (INHALATION) at 16:29

## 2017-01-23 RX ADMIN — METOPROLOL TARTRATE 5 MG: 5 INJECTION INTRAVENOUS at 09:12

## 2017-01-23 RX ADMIN — METOCLOPRAMIDE 10 MG: 10 TABLET ORAL at 08:21

## 2017-01-23 RX ADMIN — SODIUM PHOSPHATE, MONOBASIC, MONOHYDRATE AND SODIUM PHOSPHATE, DIBASIC, ANHYDROUS 40 MMOL: 276; 142 INJECTION, SOLUTION INTRAVENOUS at 13:36

## 2017-01-23 RX ADMIN — PERFLUTREN 2 ML: 6.52 INJECTION, SUSPENSION INTRAVENOUS at 15:57

## 2017-01-23 RX ADMIN — METOCLOPRAMIDE 10 MG: 10 TABLET ORAL at 18:10

## 2017-01-23 RX ADMIN — ATORVASTATIN CALCIUM 10 MG: 10 TABLET, FILM COATED ORAL at 08:21

## 2017-01-23 RX ADMIN — POTASSIUM CHLORIDE 30 MEQ: 750 CAPSULE, EXTENDED RELEASE ORAL at 08:22

## 2017-01-24 LAB
ALBUMIN SERPL-MCNC: 3.7 G/DL (ref 3.5–5.2)
ANION GAP SERPL CALCULATED.3IONS-SCNC: 15.5 MMOL/L
BASOPHILS # BLD AUTO: 0.07 10*3/MM3 (ref 0–0.2)
BASOPHILS NFR BLD AUTO: 0.6 % (ref 0–1.5)
BUN BLD-MCNC: 8 MG/DL (ref 6–20)
BUN/CREAT SERPL: 10.4 (ref 7–25)
CALCIUM SPEC-SCNC: 9.1 MG/DL (ref 8.6–10.5)
CHLORIDE SERPL-SCNC: 96 MMOL/L (ref 98–107)
CO2 SERPL-SCNC: 26.5 MMOL/L (ref 22–29)
CREAT BLD-MCNC: 0.77 MG/DL (ref 0.57–1)
DEPRECATED RDW RBC AUTO: 48.5 FL (ref 37–54)
EOSINOPHIL # BLD AUTO: 0.13 10*3/MM3 (ref 0–0.7)
EOSINOPHIL NFR BLD AUTO: 1.1 % (ref 0.3–6.2)
ERYTHROCYTE [DISTWIDTH] IN BLOOD BY AUTOMATED COUNT: 15.4 % (ref 11.7–13)
GFR SERPL CREATININE-BSD FRML MDRD: 78 ML/MIN/1.73
GLUCOSE BLD-MCNC: 151 MG/DL (ref 65–99)
GLUCOSE BLDC GLUCOMTR-MCNC: 140 MG/DL (ref 70–130)
GLUCOSE BLDC GLUCOMTR-MCNC: 146 MG/DL (ref 70–130)
GLUCOSE BLDC GLUCOMTR-MCNC: 150 MG/DL (ref 70–130)
HCT VFR BLD AUTO: 44.4 % (ref 35.6–45.5)
HGB BLD-MCNC: 14 G/DL (ref 11.9–15.5)
IMM GRANULOCYTES # BLD: 0.02 10*3/MM3 (ref 0–0.03)
IMM GRANULOCYTES NFR BLD: 0.2 % (ref 0–0.5)
INR PPP: 2.97 (ref 0.9–1.1)
LYMPHOCYTES # BLD AUTO: 2.49 10*3/MM3 (ref 0.9–4.8)
LYMPHOCYTES NFR BLD AUTO: 21.5 % (ref 19.6–45.3)
MCH RBC QN AUTO: 27.1 PG (ref 26.9–32)
MCHC RBC AUTO-ENTMCNC: 31.5 G/DL (ref 32.4–36.3)
MCV RBC AUTO: 85.9 FL (ref 80.5–98.2)
MONOCYTES # BLD AUTO: 0.95 10*3/MM3 (ref 0.2–1.2)
MONOCYTES NFR BLD AUTO: 8.2 % (ref 5–12)
NEUTROPHILS # BLD AUTO: 7.92 10*3/MM3 (ref 1.9–8.1)
NEUTROPHILS NFR BLD AUTO: 68.4 % (ref 42.7–76)
NRBC BLD MANUAL-RTO: 0 /100 WBC (ref 0–0)
PHOSPHATE SERPL-MCNC: 3.2 MG/DL (ref 2.5–4.5)
PLATELET # BLD AUTO: 312 10*3/MM3 (ref 140–500)
PMV BLD AUTO: 11 FL (ref 6–12)
POTASSIUM BLD-SCNC: 3.5 MMOL/L (ref 3.5–5.2)
PROTHROMBIN TIME: 30 SECONDS (ref 11.7–14.2)
RBC # BLD AUTO: 5.17 10*6/MM3 (ref 3.9–5.2)
SODIUM BLD-SCNC: 138 MMOL/L (ref 136–145)
WBC NRBC COR # BLD: 11.58 10*3/MM3 (ref 4.5–10.7)

## 2017-01-24 PROCEDURE — 99232 SBSQ HOSP IP/OBS MODERATE 35: CPT | Performed by: INTERNAL MEDICINE

## 2017-01-24 PROCEDURE — 85025 COMPLETE CBC W/AUTO DIFF WBC: CPT | Performed by: INTERNAL MEDICINE

## 2017-01-24 PROCEDURE — 80069 RENAL FUNCTION PANEL: CPT | Performed by: INTERNAL MEDICINE

## 2017-01-24 PROCEDURE — 94640 AIRWAY INHALATION TREATMENT: CPT

## 2017-01-24 PROCEDURE — 25010000002 FUROSEMIDE PER 20 MG: Performed by: INTERNAL MEDICINE

## 2017-01-24 PROCEDURE — 93005 ELECTROCARDIOGRAM TRACING: CPT | Performed by: INTERNAL MEDICINE

## 2017-01-24 PROCEDURE — 85610 PROTHROMBIN TIME: CPT | Performed by: INTERNAL MEDICINE

## 2017-01-24 PROCEDURE — 82962 GLUCOSE BLOOD TEST: CPT

## 2017-01-24 PROCEDURE — 93010 ELECTROCARDIOGRAM REPORT: CPT | Performed by: INTERNAL MEDICINE

## 2017-01-24 RX ADMIN — METHOCARBAMOL 500 MG: 500 TABLET ORAL at 22:27

## 2017-01-24 RX ADMIN — SUCRALFATE 1 G: 1 TABLET ORAL at 08:27

## 2017-01-24 RX ADMIN — SULFASALAZINE 500 MG: 500 TABLET ORAL at 08:27

## 2017-01-24 RX ADMIN — ASPIRIN 81 MG: 81 TABLET, CHEWABLE ORAL at 08:27

## 2017-01-24 RX ADMIN — SULFASALAZINE 500 MG: 500 TABLET ORAL at 17:17

## 2017-01-24 RX ADMIN — FUROSEMIDE 40 MG: 10 INJECTION, SOLUTION INTRAMUSCULAR; INTRAVENOUS at 01:51

## 2017-01-24 RX ADMIN — PANTOPRAZOLE SODIUM 40 MG: 40 TABLET, DELAYED RELEASE ORAL at 08:26

## 2017-01-24 RX ADMIN — METOPROLOL TARTRATE 25 MG: 25 TABLET ORAL at 08:27

## 2017-01-24 RX ADMIN — TIOTROPIUM BROMIDE 1 CAPSULE: 18 CAPSULE ORAL; RESPIRATORY (INHALATION) at 10:34

## 2017-01-24 RX ADMIN — SUCRALFATE 1 G: 1 TABLET ORAL at 15:42

## 2017-01-24 RX ADMIN — ATORVASTATIN CALCIUM 10 MG: 10 TABLET, FILM COATED ORAL at 08:27

## 2017-01-24 RX ADMIN — METOPROLOL TARTRATE 25 MG: 25 TABLET ORAL at 22:27

## 2017-01-24 RX ADMIN — METHOCARBAMOL 500 MG: 500 TABLET ORAL at 15:42

## 2017-01-24 RX ADMIN — METOCLOPRAMIDE 10 MG: 10 TABLET ORAL at 17:09

## 2017-01-24 RX ADMIN — POTASSIUM CHLORIDE 10 MEQ: 750 CAPSULE, EXTENDED RELEASE ORAL at 08:27

## 2017-01-24 RX ADMIN — Medication 500 MCG: at 08:27

## 2017-01-24 RX ADMIN — PAROXETINE HYDROCHLORIDE 10 MG: 10 TABLET, FILM COATED ORAL at 22:27

## 2017-01-24 RX ADMIN — METHOCARBAMOL 500 MG: 500 TABLET ORAL at 08:27

## 2017-01-24 RX ADMIN — SUCRALFATE 1 G: 1 TABLET ORAL at 22:27

## 2017-01-24 RX ADMIN — FUROSEMIDE 40 MG: 10 INJECTION, SOLUTION INTRAMUSCULAR; INTRAVENOUS at 08:27

## 2017-01-24 RX ADMIN — METOCLOPRAMIDE 10 MG: 10 TABLET ORAL at 08:27

## 2017-01-25 LAB
ALBUMIN SERPL-MCNC: 3.4 G/DL (ref 3.5–5.2)
ANION GAP SERPL CALCULATED.3IONS-SCNC: 14.5 MMOL/L
BASOPHILS # BLD AUTO: 0.1 10*3/MM3 (ref 0–0.2)
BASOPHILS NFR BLD AUTO: 0.9 % (ref 0–1.5)
BUN BLD-MCNC: 9 MG/DL (ref 6–20)
BUN/CREAT SERPL: 11.1 (ref 7–25)
CALCIUM SPEC-SCNC: 9.2 MG/DL (ref 8.6–10.5)
CHLORIDE SERPL-SCNC: 95 MMOL/L (ref 98–107)
CO2 SERPL-SCNC: 26.5 MMOL/L (ref 22–29)
CREAT BLD-MCNC: 0.81 MG/DL (ref 0.57–1)
DEPRECATED RDW RBC AUTO: 48.4 FL (ref 37–54)
EOSINOPHIL # BLD AUTO: 0.17 10*3/MM3 (ref 0–0.7)
EOSINOPHIL NFR BLD AUTO: 1.6 % (ref 0.3–6.2)
ERYTHROCYTE [DISTWIDTH] IN BLOOD BY AUTOMATED COUNT: 15.2 % (ref 11.7–13)
GFR SERPL CREATININE-BSD FRML MDRD: 74 ML/MIN/1.73
GLUCOSE BLD-MCNC: 146 MG/DL (ref 65–99)
GLUCOSE BLDC GLUCOMTR-MCNC: 128 MG/DL (ref 70–130)
GLUCOSE BLDC GLUCOMTR-MCNC: 137 MG/DL (ref 70–130)
GLUCOSE BLDC GLUCOMTR-MCNC: 137 MG/DL (ref 70–130)
HCT VFR BLD AUTO: 44.1 % (ref 35.6–45.5)
HGB BLD-MCNC: 13.7 G/DL (ref 11.9–15.5)
IMM GRANULOCYTES # BLD: 0.02 10*3/MM3 (ref 0–0.03)
IMM GRANULOCYTES NFR BLD: 0.2 % (ref 0–0.5)
INR PPP: 2.32 (ref 0.9–1.1)
LYMPHOCYTES # BLD AUTO: 2.52 10*3/MM3 (ref 0.9–4.8)
LYMPHOCYTES NFR BLD AUTO: 23.1 % (ref 19.6–45.3)
MCH RBC QN AUTO: 27.5 PG (ref 26.9–32)
MCHC RBC AUTO-ENTMCNC: 31.1 G/DL (ref 32.4–36.3)
MCV RBC AUTO: 88.4 FL (ref 80.5–98.2)
MONOCYTES # BLD AUTO: 0.79 10*3/MM3 (ref 0.2–1.2)
MONOCYTES NFR BLD AUTO: 7.2 % (ref 5–12)
NEUTROPHILS # BLD AUTO: 7.31 10*3/MM3 (ref 1.9–8.1)
NEUTROPHILS NFR BLD AUTO: 67 % (ref 42.7–76)
PHOSPHATE SERPL-MCNC: 3.3 MG/DL (ref 2.5–4.5)
PLATELET # BLD AUTO: 328 10*3/MM3 (ref 140–500)
PMV BLD AUTO: 10.8 FL (ref 6–12)
POTASSIUM BLD-SCNC: 3.7 MMOL/L (ref 3.5–5.2)
PROTHROMBIN TIME: 24.7 SECONDS (ref 11.7–14.2)
RBC # BLD AUTO: 4.99 10*6/MM3 (ref 3.9–5.2)
SODIUM BLD-SCNC: 136 MMOL/L (ref 136–145)
WBC NRBC COR # BLD: 10.91 10*3/MM3 (ref 4.5–10.7)

## 2017-01-25 PROCEDURE — 25010000002 MIDAZOLAM PER 1 MG: Performed by: INTERNAL MEDICINE

## 2017-01-25 PROCEDURE — B2151ZZ FLUOROSCOPY OF LEFT HEART USING LOW OSMOLAR CONTRAST: ICD-10-PCS | Performed by: INTERNAL MEDICINE

## 2017-01-25 PROCEDURE — 99024 POSTOP FOLLOW-UP VISIT: CPT | Performed by: INTERNAL MEDICINE

## 2017-01-25 PROCEDURE — 25010000002 FENTANYL CITRATE (PF) 100 MCG/2ML SOLUTION: Performed by: INTERNAL MEDICINE

## 2017-01-25 PROCEDURE — 82962 GLUCOSE BLOOD TEST: CPT

## 2017-01-25 PROCEDURE — 4A023N7 MEASUREMENT OF CARDIAC SAMPLING AND PRESSURE, LEFT HEART, PERCUTANEOUS APPROACH: ICD-10-PCS | Performed by: INTERNAL MEDICINE

## 2017-01-25 PROCEDURE — 80069 RENAL FUNCTION PANEL: CPT | Performed by: INTERNAL MEDICINE

## 2017-01-25 PROCEDURE — 87150 DNA/RNA AMPLIFIED PROBE: CPT | Performed by: INTERNAL MEDICINE

## 2017-01-25 PROCEDURE — C1894 INTRO/SHEATH, NON-LASER: HCPCS | Performed by: INTERNAL MEDICINE

## 2017-01-25 PROCEDURE — 85610 PROTHROMBIN TIME: CPT | Performed by: INTERNAL MEDICINE

## 2017-01-25 PROCEDURE — C1769 GUIDE WIRE: HCPCS | Performed by: INTERNAL MEDICINE

## 2017-01-25 PROCEDURE — 93454 CORONARY ARTERY ANGIO S&I: CPT | Performed by: INTERNAL MEDICINE

## 2017-01-25 PROCEDURE — 87147 CULTURE TYPE IMMUNOLOGIC: CPT | Performed by: INTERNAL MEDICINE

## 2017-01-25 PROCEDURE — 25010000002 HEPARIN (PORCINE) PER 1000 UNITS: Performed by: INTERNAL MEDICINE

## 2017-01-25 PROCEDURE — 85025 COMPLETE CBC W/AUTO DIFF WBC: CPT | Performed by: INTERNAL MEDICINE

## 2017-01-25 PROCEDURE — 94640 AIRWAY INHALATION TREATMENT: CPT

## 2017-01-25 PROCEDURE — B2111ZZ FLUOROSCOPY OF MULTIPLE CORONARY ARTERIES USING LOW OSMOLAR CONTRAST: ICD-10-PCS | Performed by: INTERNAL MEDICINE

## 2017-01-25 PROCEDURE — 87040 BLOOD CULTURE FOR BACTERIA: CPT | Performed by: INTERNAL MEDICINE

## 2017-01-25 PROCEDURE — 0 IOPAMIDOL PER 1 ML: Performed by: INTERNAL MEDICINE

## 2017-01-25 PROCEDURE — 87076 CULTURE ANAEROBE IDENT EACH: CPT | Performed by: INTERNAL MEDICINE

## 2017-01-25 RX ORDER — SODIUM CHLORIDE 0.9 % (FLUSH) 0.9 %
1-10 SYRINGE (ML) INJECTION AS NEEDED
Status: DISCONTINUED | OUTPATIENT
Start: 2017-01-25 | End: 2017-01-27 | Stop reason: HOSPADM

## 2017-01-25 RX ORDER — ONDANSETRON 2 MG/ML
4 INJECTION INTRAMUSCULAR; INTRAVENOUS EVERY 6 HOURS PRN
Status: DISCONTINUED | OUTPATIENT
Start: 2017-01-25 | End: 2017-01-27 | Stop reason: HOSPADM

## 2017-01-25 RX ORDER — NALOXONE HCL 0.4 MG/ML
0.4 VIAL (ML) INJECTION
Status: DISCONTINUED | OUTPATIENT
Start: 2017-01-25 | End: 2017-01-27 | Stop reason: HOSPADM

## 2017-01-25 RX ORDER — LIDOCAINE HYDROCHLORIDE 20 MG/ML
INJECTION, SOLUTION INFILTRATION; PERINEURAL AS NEEDED
Status: DISCONTINUED | OUTPATIENT
Start: 2017-01-25 | End: 2017-01-25 | Stop reason: HOSPADM

## 2017-01-25 RX ORDER — SODIUM CHLORIDE 9 MG/ML
INJECTION, SOLUTION INTRAVENOUS CONTINUOUS PRN
Status: DISCONTINUED | OUTPATIENT
Start: 2017-01-25 | End: 2017-01-25 | Stop reason: HOSPADM

## 2017-01-25 RX ORDER — FENTANYL CITRATE 50 UG/ML
INJECTION, SOLUTION INTRAMUSCULAR; INTRAVENOUS AS NEEDED
Status: DISCONTINUED | OUTPATIENT
Start: 2017-01-25 | End: 2017-01-25 | Stop reason: HOSPADM

## 2017-01-25 RX ORDER — ONDANSETRON 4 MG/1
4 TABLET, FILM COATED ORAL EVERY 6 HOURS PRN
Status: DISCONTINUED | OUTPATIENT
Start: 2017-01-25 | End: 2017-01-27 | Stop reason: HOSPADM

## 2017-01-25 RX ORDER — WARFARIN SODIUM 2 MG/1
2 TABLET ORAL
Status: COMPLETED | OUTPATIENT
Start: 2017-01-25 | End: 2017-01-25

## 2017-01-25 RX ORDER — HYDROCODONE BITARTRATE AND ACETAMINOPHEN 5; 325 MG/1; MG/1
1 TABLET ORAL EVERY 4 HOURS PRN
Status: DISCONTINUED | OUTPATIENT
Start: 2017-01-25 | End: 2017-01-27 | Stop reason: HOSPADM

## 2017-01-25 RX ORDER — ONDANSETRON 4 MG/1
4 TABLET, ORALLY DISINTEGRATING ORAL EVERY 6 HOURS PRN
Status: DISCONTINUED | OUTPATIENT
Start: 2017-01-25 | End: 2017-01-27 | Stop reason: HOSPADM

## 2017-01-25 RX ORDER — MIDAZOLAM HYDROCHLORIDE 1 MG/ML
INJECTION INTRAMUSCULAR; INTRAVENOUS AS NEEDED
Status: DISCONTINUED | OUTPATIENT
Start: 2017-01-25 | End: 2017-01-25 | Stop reason: HOSPADM

## 2017-01-25 RX ORDER — MORPHINE SULFATE 2 MG/ML
1 INJECTION, SOLUTION INTRAMUSCULAR; INTRAVENOUS EVERY 4 HOURS PRN
Status: DISCONTINUED | OUTPATIENT
Start: 2017-01-25 | End: 2017-01-27 | Stop reason: HOSPADM

## 2017-01-25 RX ADMIN — METHOCARBAMOL 500 MG: 500 TABLET ORAL at 15:52

## 2017-01-25 RX ADMIN — METOPROLOL TARTRATE 25 MG: 25 TABLET ORAL at 17:08

## 2017-01-25 RX ADMIN — ASPIRIN 81 MG: 81 TABLET, CHEWABLE ORAL at 08:33

## 2017-01-25 RX ADMIN — SUCRALFATE 1 G: 1 TABLET ORAL at 20:35

## 2017-01-25 RX ADMIN — Medication 500 MCG: at 08:33

## 2017-01-25 RX ADMIN — METOCLOPRAMIDE 10 MG: 10 TABLET ORAL at 17:08

## 2017-01-25 RX ADMIN — ATORVASTATIN CALCIUM 10 MG: 10 TABLET, FILM COATED ORAL at 08:33

## 2017-01-25 RX ADMIN — METOPROLOL TARTRATE 25 MG: 25 TABLET ORAL at 08:47

## 2017-01-25 RX ADMIN — TIOTROPIUM BROMIDE 1 CAPSULE: 18 CAPSULE ORAL; RESPIRATORY (INHALATION) at 07:57

## 2017-01-25 RX ADMIN — METHOCARBAMOL 500 MG: 500 TABLET ORAL at 08:33

## 2017-01-25 RX ADMIN — METHOCARBAMOL 500 MG: 500 TABLET ORAL at 20:35

## 2017-01-25 RX ADMIN — SUCRALFATE 1 G: 1 TABLET ORAL at 08:33

## 2017-01-25 RX ADMIN — WARFARIN SODIUM 2 MG: 2 TABLET ORAL at 17:08

## 2017-01-25 RX ADMIN — SULFASALAZINE 500 MG: 500 TABLET ORAL at 08:33

## 2017-01-25 RX ADMIN — METOCLOPRAMIDE 10 MG: 10 TABLET ORAL at 08:33

## 2017-01-25 RX ADMIN — POTASSIUM CHLORIDE 10 MEQ: 750 CAPSULE, EXTENDED RELEASE ORAL at 08:33

## 2017-01-25 RX ADMIN — SULFASALAZINE 500 MG: 500 TABLET ORAL at 17:08

## 2017-01-25 RX ADMIN — PANTOPRAZOLE SODIUM 40 MG: 40 TABLET, DELAYED RELEASE ORAL at 06:39

## 2017-01-25 RX ADMIN — PAROXETINE HYDROCHLORIDE 10 MG: 10 TABLET, FILM COATED ORAL at 20:35

## 2017-01-25 RX ADMIN — POTASSIUM CHLORIDE 10 MEQ: 750 CAPSULE, EXTENDED RELEASE ORAL at 08:35

## 2017-01-25 RX ADMIN — METOPROLOL TARTRATE 25 MG: 25 TABLET ORAL at 20:35

## 2017-01-25 RX ADMIN — SUCRALFATE 1 G: 1 TABLET ORAL at 15:52

## 2017-01-26 ENCOUNTER — APPOINTMENT (OUTPATIENT)
Dept: CARDIOLOGY | Facility: HOSPITAL | Age: 57
End: 2017-01-26
Attending: INTERNAL MEDICINE

## 2017-01-26 LAB
ALBUMIN SERPL-MCNC: 3.3 G/DL (ref 3.5–5.2)
ANION GAP SERPL CALCULATED.3IONS-SCNC: 11.8 MMOL/L
BACTERIA BLD CULT: ABNORMAL
BASOPHILS # BLD AUTO: 0.05 10*3/MM3 (ref 0–0.2)
BASOPHILS NFR BLD AUTO: 0.5 % (ref 0–1.5)
BH CV ECHO MEAS - BSA(HAYCOCK): 1.9 M^2
BH CV ECHO MEAS - BSA: 1.8 M^2
BH CV ECHO MEAS - BZI_BMI: 31.5 KILOGRAMS/M^2
BH CV ECHO MEAS - BZI_METRIC_HEIGHT: 157.5 CM
BH CV ECHO MEAS - BZI_METRIC_WEIGHT: 78 KG
BH CV ECHO MEAS - MV DEC SLOPE: 447 CM/SEC^2
BH CV ECHO MEAS - MV MAX PG: 6.2 MMHG
BH CV ECHO MEAS - MV MEAN PG: 3 MMHG
BH CV ECHO MEAS - MV P1/2T MAX VEL: 117 CM/SEC
BH CV ECHO MEAS - MV P1/2T: 76.7 MSEC
BH CV ECHO MEAS - MV V2 MAX: 124 CM/SEC
BH CV ECHO MEAS - MV V2 MEAN: 81.1 CM/SEC
BH CV ECHO MEAS - MV V2 VTI: 31.9 CM
BH CV ECHO MEAS - MVA P1/2T LCG: 1.9 CM^2
BH CV ECHO MEAS - MVA(P1/2T): 2.9 CM^2
BUN BLD-MCNC: 10 MG/DL (ref 6–20)
BUN/CREAT SERPL: 13.5 (ref 7–25)
CALCIUM SPEC-SCNC: 9 MG/DL (ref 8.6–10.5)
CHLORIDE SERPL-SCNC: 98 MMOL/L (ref 98–107)
CO2 SERPL-SCNC: 26.2 MMOL/L (ref 22–29)
CREAT BLD-MCNC: 0.74 MG/DL (ref 0.57–1)
DEPRECATED RDW RBC AUTO: 48.1 FL (ref 37–54)
EOSINOPHIL # BLD AUTO: 0.13 10*3/MM3 (ref 0–0.7)
EOSINOPHIL NFR BLD AUTO: 1.2 % (ref 0.3–6.2)
ERYTHROCYTE [DISTWIDTH] IN BLOOD BY AUTOMATED COUNT: 15.5 % (ref 11.7–13)
GFR SERPL CREATININE-BSD FRML MDRD: 82 ML/MIN/1.73
GLUCOSE BLD-MCNC: 123 MG/DL (ref 65–99)
GLUCOSE BLDC GLUCOMTR-MCNC: 116 MG/DL (ref 70–130)
HCT VFR BLD AUTO: 40.4 % (ref 35.6–45.5)
HGB BLD-MCNC: 13.1 G/DL (ref 11.9–15.5)
IMM GRANULOCYTES # BLD: 0.03 10*3/MM3 (ref 0–0.03)
IMM GRANULOCYTES NFR BLD: 0.3 % (ref 0–0.5)
INR PPP: 2 (ref 0.9–1.1)
INR PPP: 2.16 (ref 0.9–1.1)
LV EF 2D ECHO EST: 35 %
LYMPHOCYTES # BLD AUTO: 2.77 10*3/MM3 (ref 0.9–4.8)
LYMPHOCYTES NFR BLD AUTO: 25.5 % (ref 19.6–45.3)
MCH RBC QN AUTO: 28.1 PG (ref 26.9–32)
MCHC RBC AUTO-ENTMCNC: 32.4 G/DL (ref 32.4–36.3)
MCV RBC AUTO: 86.5 FL (ref 80.5–98.2)
MONOCYTES # BLD AUTO: 0.96 10*3/MM3 (ref 0.2–1.2)
MONOCYTES NFR BLD AUTO: 8.8 % (ref 5–12)
NEUTROPHILS # BLD AUTO: 6.91 10*3/MM3 (ref 1.9–8.1)
NEUTROPHILS NFR BLD AUTO: 63.7 % (ref 42.7–76)
PHOSPHATE SERPL-MCNC: 2.8 MG/DL (ref 2.5–4.5)
PLATELET # BLD AUTO: 288 10*3/MM3 (ref 140–500)
PMV BLD AUTO: 11.3 FL (ref 6–12)
POTASSIUM BLD-SCNC: 3.7 MMOL/L (ref 3.5–5.2)
PROTHROMBIN TIME: 22 SECONDS (ref 11.7–14.2)
PROTHROMBIN TIME: 23.3 SECONDS (ref 11.7–14.2)
RBC # BLD AUTO: 4.67 10*6/MM3 (ref 3.9–5.2)
SODIUM BLD-SCNC: 136 MMOL/L (ref 136–145)
WBC NRBC COR # BLD: 10.85 10*3/MM3 (ref 4.5–10.7)

## 2017-01-26 PROCEDURE — 93320 DOPPLER ECHO COMPLETE: CPT

## 2017-01-26 PROCEDURE — 82962 GLUCOSE BLOOD TEST: CPT

## 2017-01-26 PROCEDURE — 93320 DOPPLER ECHO COMPLETE: CPT | Performed by: INTERNAL MEDICINE

## 2017-01-26 PROCEDURE — 85610 PROTHROMBIN TIME: CPT | Performed by: INTERNAL MEDICINE

## 2017-01-26 PROCEDURE — 80069 RENAL FUNCTION PANEL: CPT | Performed by: INTERNAL MEDICINE

## 2017-01-26 PROCEDURE — 99232 SBSQ HOSP IP/OBS MODERATE 35: CPT | Performed by: INTERNAL MEDICINE

## 2017-01-26 PROCEDURE — 85025 COMPLETE CBC W/AUTO DIFF WBC: CPT | Performed by: INTERNAL MEDICINE

## 2017-01-26 PROCEDURE — 94640 AIRWAY INHALATION TREATMENT: CPT

## 2017-01-26 PROCEDURE — 93325 DOPPLER ECHO COLOR FLOW MAPG: CPT | Performed by: INTERNAL MEDICINE

## 2017-01-26 PROCEDURE — 25010000002 MIDAZOLAM PER 1 MG: Performed by: INTERNAL MEDICINE

## 2017-01-26 PROCEDURE — 93312 ECHO TRANSESOPHAGEAL: CPT

## 2017-01-26 PROCEDURE — 93312 ECHO TRANSESOPHAGEAL: CPT | Performed by: INTERNAL MEDICINE

## 2017-01-26 PROCEDURE — B246ZZ4 ULTRASONOGRAPHY OF RIGHT AND LEFT HEART, TRANSESOPHAGEAL: ICD-10-PCS | Performed by: INTERNAL MEDICINE

## 2017-01-26 PROCEDURE — 93325 DOPPLER ECHO COLOR FLOW MAPG: CPT

## 2017-01-26 PROCEDURE — 25010000002 FENTANYL CITRATE (PF) 100 MCG/2ML SOLUTION: Performed by: INTERNAL MEDICINE

## 2017-01-26 RX ORDER — FENTANYL CITRATE 50 UG/ML
INJECTION, SOLUTION INTRAMUSCULAR; INTRAVENOUS
Status: COMPLETED | OUTPATIENT
Start: 2017-01-26 | End: 2017-01-26

## 2017-01-26 RX ORDER — SODIUM CHLORIDE 9 MG/ML
INJECTION, SOLUTION INTRAVENOUS
Status: COMPLETED | OUTPATIENT
Start: 2017-01-26 | End: 2017-01-26

## 2017-01-26 RX ORDER — METOPROLOL TARTRATE 50 MG/1
50 TABLET, FILM COATED ORAL EVERY 12 HOURS SCHEDULED
Status: DISCONTINUED | OUTPATIENT
Start: 2017-01-26 | End: 2017-01-27 | Stop reason: HOSPADM

## 2017-01-26 RX ORDER — MIDAZOLAM HYDROCHLORIDE 1 MG/ML
INJECTION INTRAMUSCULAR; INTRAVENOUS
Status: COMPLETED | OUTPATIENT
Start: 2017-01-26 | End: 2017-01-26

## 2017-01-26 RX ORDER — WARFARIN SODIUM 3 MG/1
3 TABLET ORAL
Status: DISCONTINUED | OUTPATIENT
Start: 2017-01-26 | End: 2017-01-27 | Stop reason: HOSPADM

## 2017-01-26 RX ADMIN — SULFASALAZINE 500 MG: 500 TABLET ORAL at 19:16

## 2017-01-26 RX ADMIN — ATORVASTATIN CALCIUM 10 MG: 10 TABLET, FILM COATED ORAL at 12:32

## 2017-01-26 RX ADMIN — SODIUM CHLORIDE 50 ML: 9 INJECTION, SOLUTION INTRAVENOUS at 09:32

## 2017-01-26 RX ADMIN — MIDAZOLAM 1 MG: 1 INJECTION INTRAMUSCULAR; INTRAVENOUS at 10:58

## 2017-01-26 RX ADMIN — WARFARIN SODIUM 3 MG: 3 TABLET ORAL at 19:16

## 2017-01-26 RX ADMIN — FENTANYL CITRATE 25 MCG: 50 INJECTION INTRAMUSCULAR; INTRAVENOUS at 10:56

## 2017-01-26 RX ADMIN — METHOCARBAMOL 500 MG: 500 TABLET ORAL at 20:27

## 2017-01-26 RX ADMIN — METOCLOPRAMIDE 10 MG: 10 TABLET ORAL at 12:32

## 2017-01-26 RX ADMIN — METOPROLOL TARTRATE 12.5 MG: 25 TABLET ORAL at 16:32

## 2017-01-26 RX ADMIN — METOPROLOL TARTRATE 25 MG: 25 TABLET ORAL at 12:32

## 2017-01-26 RX ADMIN — ASPIRIN 81 MG: 81 TABLET, CHEWABLE ORAL at 12:33

## 2017-01-26 RX ADMIN — LIDOCAINE HYDROCHLORIDE 10 ML: 20 SOLUTION ORAL; TOPICAL at 10:24

## 2017-01-26 RX ADMIN — MIDAZOLAM 2 MG: 1 INJECTION INTRAMUSCULAR; INTRAVENOUS at 10:51

## 2017-01-26 RX ADMIN — SULFASALAZINE 500 MG: 500 TABLET ORAL at 12:32

## 2017-01-26 RX ADMIN — METHOCARBAMOL 500 MG: 500 TABLET ORAL at 16:32

## 2017-01-26 RX ADMIN — FENTANYL CITRATE 25 MCG: 50 INJECTION INTRAMUSCULAR; INTRAVENOUS at 10:51

## 2017-01-26 RX ADMIN — SUCRALFATE 1 G: 1 TABLET ORAL at 12:32

## 2017-01-26 RX ADMIN — TIOTROPIUM BROMIDE 1 CAPSULE: 18 CAPSULE ORAL; RESPIRATORY (INHALATION) at 06:53

## 2017-01-26 RX ADMIN — METHOCARBAMOL 500 MG: 500 TABLET ORAL at 12:32

## 2017-01-26 RX ADMIN — Medication 500 MCG: at 12:33

## 2017-01-26 RX ADMIN — SUCRALFATE 1 G: 1 TABLET ORAL at 16:32

## 2017-01-26 RX ADMIN — PAROXETINE HYDROCHLORIDE 10 MG: 10 TABLET, FILM COATED ORAL at 20:27

## 2017-01-26 RX ADMIN — POTASSIUM CHLORIDE 10 MEQ: 750 CAPSULE, EXTENDED RELEASE ORAL at 12:33

## 2017-01-26 RX ADMIN — SUCRALFATE 1 G: 1 TABLET ORAL at 20:27

## 2017-01-26 RX ADMIN — METOCLOPRAMIDE 10 MG: 10 TABLET ORAL at 19:17

## 2017-01-27 VITALS
OXYGEN SATURATION: 93 % | DIASTOLIC BLOOD PRESSURE: 65 MMHG | WEIGHT: 171.06 LBS | BODY MASS INDEX: 31.48 KG/M2 | HEART RATE: 96 BPM | RESPIRATION RATE: 18 BRPM | SYSTOLIC BLOOD PRESSURE: 121 MMHG | TEMPERATURE: 97.9 F | HEIGHT: 62 IN

## 2017-01-27 LAB
ALBUMIN SERPL-MCNC: 3.3 G/DL (ref 3.5–5.2)
ANION GAP SERPL CALCULATED.3IONS-SCNC: 10.6 MMOL/L
BACTERIA SPEC AEROBE CULT: NORMAL
BACTERIA SPEC AEROBE CULT: NORMAL
BASOPHILS # BLD AUTO: 0.05 10*3/MM3 (ref 0–0.2)
BASOPHILS NFR BLD AUTO: 0.5 % (ref 0–1.5)
BUN BLD-MCNC: 11 MG/DL (ref 6–20)
BUN/CREAT SERPL: 12 (ref 7–25)
CALCIUM SPEC-SCNC: 8.8 MG/DL (ref 8.6–10.5)
CHLORIDE SERPL-SCNC: 101 MMOL/L (ref 98–107)
CO2 SERPL-SCNC: 26.4 MMOL/L (ref 22–29)
CREAT BLD-MCNC: 0.92 MG/DL (ref 0.57–1)
DEPRECATED RDW RBC AUTO: 49 FL (ref 37–54)
EOSINOPHIL # BLD AUTO: 0.21 10*3/MM3 (ref 0–0.7)
EOSINOPHIL NFR BLD AUTO: 2.2 % (ref 0.3–6.2)
ERYTHROCYTE [DISTWIDTH] IN BLOOD BY AUTOMATED COUNT: 15.5 % (ref 11.7–13)
GFR SERPL CREATININE-BSD FRML MDRD: 64 ML/MIN/1.73
GLUCOSE BLD-MCNC: 123 MG/DL (ref 65–99)
HCT VFR BLD AUTO: 39.9 % (ref 35.6–45.5)
HGB BLD-MCNC: 12.8 G/DL (ref 11.9–15.5)
IMM GRANULOCYTES # BLD: 0.02 10*3/MM3 (ref 0–0.03)
IMM GRANULOCYTES NFR BLD: 0.2 % (ref 0–0.5)
INR PPP: 1.97 (ref 0.9–1.1)
LYMPHOCYTES # BLD AUTO: 2.53 10*3/MM3 (ref 0.9–4.8)
LYMPHOCYTES NFR BLD AUTO: 26.6 % (ref 19.6–45.3)
MCH RBC QN AUTO: 27.9 PG (ref 26.9–32)
MCHC RBC AUTO-ENTMCNC: 32.1 G/DL (ref 32.4–36.3)
MCV RBC AUTO: 87.1 FL (ref 80.5–98.2)
MONOCYTES # BLD AUTO: 1.01 10*3/MM3 (ref 0.2–1.2)
MONOCYTES NFR BLD AUTO: 10.6 % (ref 5–12)
NEUTROPHILS # BLD AUTO: 5.69 10*3/MM3 (ref 1.9–8.1)
NEUTROPHILS NFR BLD AUTO: 59.9 % (ref 42.7–76)
PHOSPHATE SERPL-MCNC: 3.4 MG/DL (ref 2.5–4.5)
PLATELET # BLD AUTO: 274 10*3/MM3 (ref 140–500)
PMV BLD AUTO: 10.7 FL (ref 6–12)
POTASSIUM BLD-SCNC: 4 MMOL/L (ref 3.5–5.2)
PROTHROMBIN TIME: 21.8 SECONDS (ref 11.7–14.2)
RBC # BLD AUTO: 4.58 10*6/MM3 (ref 3.9–5.2)
SODIUM BLD-SCNC: 138 MMOL/L (ref 136–145)
WBC NRBC COR # BLD: 9.51 10*3/MM3 (ref 4.5–10.7)

## 2017-01-27 PROCEDURE — 85610 PROTHROMBIN TIME: CPT | Performed by: INTERNAL MEDICINE

## 2017-01-27 PROCEDURE — 85025 COMPLETE CBC W/AUTO DIFF WBC: CPT | Performed by: INTERNAL MEDICINE

## 2017-01-27 PROCEDURE — 94640 AIRWAY INHALATION TREATMENT: CPT

## 2017-01-27 PROCEDURE — 82962 GLUCOSE BLOOD TEST: CPT

## 2017-01-27 PROCEDURE — 99238 HOSP IP/OBS DSCHRG MGMT 30/<: CPT | Performed by: INTERNAL MEDICINE

## 2017-01-27 PROCEDURE — 80069 RENAL FUNCTION PANEL: CPT | Performed by: INTERNAL MEDICINE

## 2017-01-27 RX ORDER — METOPROLOL TARTRATE 50 MG/1
50 TABLET, FILM COATED ORAL 2 TIMES DAILY
Qty: 180 TABLET | Refills: 1 | Status: SHIPPED | OUTPATIENT
Start: 2017-01-27 | End: 2017-09-06 | Stop reason: SDUPTHER

## 2017-01-27 RX ADMIN — POTASSIUM CHLORIDE 10 MEQ: 750 CAPSULE, EXTENDED RELEASE ORAL at 09:02

## 2017-01-27 RX ADMIN — PANTOPRAZOLE SODIUM 40 MG: 40 TABLET, DELAYED RELEASE ORAL at 06:09

## 2017-01-27 RX ADMIN — METHOCARBAMOL 500 MG: 500 TABLET ORAL at 09:01

## 2017-01-27 RX ADMIN — METOPROLOL TARTRATE 50 MG: 50 TABLET ORAL at 09:01

## 2017-01-27 RX ADMIN — Medication 500 MCG: at 09:02

## 2017-01-27 RX ADMIN — ATORVASTATIN CALCIUM 10 MG: 10 TABLET, FILM COATED ORAL at 09:02

## 2017-01-27 RX ADMIN — METOCLOPRAMIDE 10 MG: 10 TABLET ORAL at 09:01

## 2017-01-27 RX ADMIN — ASPIRIN 81 MG: 81 TABLET, CHEWABLE ORAL at 09:01

## 2017-01-27 RX ADMIN — TIOTROPIUM BROMIDE 1 CAPSULE: 18 CAPSULE ORAL; RESPIRATORY (INHALATION) at 07:59

## 2017-01-27 RX ADMIN — SUCRALFATE 1 G: 1 TABLET ORAL at 09:02

## 2017-01-27 RX ADMIN — SULFASALAZINE 500 MG: 500 TABLET ORAL at 09:03

## 2017-01-29 LAB
BACTERIA SPEC AEROBE CULT: ABNORMAL
BACTERIA SPEC AEROBE CULT: ABNORMAL
GRAM STN SPEC: ABNORMAL
ISOLATED FROM: ABNORMAL
ISOLATED FROM: ABNORMAL

## 2017-01-30 LAB
BACTERIA SPEC AEROBE CULT: NORMAL
BACTERIA SPEC AEROBE CULT: NORMAL
GLUCOSE BLDC GLUCOMTR-MCNC: 107 MG/DL (ref 70–130)
GLUCOSE BLDC GLUCOMTR-MCNC: 113 MG/DL (ref 70–130)
GLUCOSE BLDC GLUCOMTR-MCNC: 143 MG/DL (ref 70–130)

## 2017-04-14 ENCOUNTER — CONVERSION ENCOUNTER (OUTPATIENT)
Dept: RHEUMATOLOGY | Facility: CLINIC | Age: 57
End: 2017-04-14

## 2017-08-16 ENCOUNTER — CONVERSION ENCOUNTER (OUTPATIENT)
Dept: RHEUMATOLOGY | Facility: CLINIC | Age: 57
End: 2017-08-16

## 2017-09-06 RX ORDER — METOPROLOL TARTRATE 50 MG/1
TABLET, FILM COATED ORAL
Qty: 120 TABLET | Refills: 0 | Status: SHIPPED | OUTPATIENT
Start: 2017-09-06 | End: 2017-11-25 | Stop reason: SDUPTHER

## 2017-11-25 RX ORDER — METOPROLOL TARTRATE 50 MG/1
50 TABLET, FILM COATED ORAL 2 TIMES DAILY
Qty: 120 TABLET | Refills: 0 | Status: SHIPPED | OUTPATIENT
Start: 2017-11-25 | End: 2019-08-12

## 2018-01-04 ENCOUNTER — HOSPITAL ENCOUNTER (OUTPATIENT)
Dept: OTHER | Facility: HOSPITAL | Age: 58
Discharge: HOME | End: 2018-01-04
Attending: INTERNAL MEDICINE | Admitting: INTERNAL MEDICINE

## 2018-01-04 LAB
ALBUMIN SERPL-MCNC: 3.3 G/DL (ref 3.5–4.8)
ALBUMIN SERPL-MCNC: 3.3 G/DL (ref 3.5–4.8)
ALBUMIN/GLOB SERPL: 0.9 {RATIO} (ref 1–1.7)
ALP SERPL-CCNC: 65 IU/L (ref 32–91)
ALPHA1 GLOB FLD ELPH-MCNC: 0.3 GM/DL (ref 0.1–0.4)
ALPHA2 GLOB SERPL ELPH-MCNC: 0.6 GM/DL (ref 0.5–1)
ALT SERPL-CCNC: 15 IU/L (ref 14–54)
ANION GAP SERPL CALC-SCNC: 11.7 MMOL/L (ref 10–20)
AST SERPL-CCNC: 21 IU/L (ref 15–41)
B-GLOBULIN SERPL ELPH-MCNC: 1.3 GM/DL (ref 0.7–1.4)
BILIRUB SERPL-MCNC: 0.3 MG/DL (ref 0.3–1.2)
BUN SERPL-MCNC: 8 MG/DL (ref 8–20)
BUN/CREAT SERPL: 13.3 (ref 5.4–26.2)
CALCIUM SERPL-MCNC: 9.3 MG/DL (ref 8.9–10.3)
CHLORIDE SERPL-SCNC: 99 MMOL/L (ref 101–111)
CONV CO2: 30 MMOL/L (ref 22–32)
CONV TOTAL PROTEIN: 6.7 G/DL (ref 6.1–7.9)
CONV TOTAL PROTEIN: 6.9 G/DL (ref 6.1–7.9)
CREAT UR-MCNC: 0.6 MG/DL (ref 0.4–1)
CRP SERPL-MCNC: 0.94 MG/DL (ref 0–0.7)
ERYTHROCYTE [DISTWIDTH] IN BLOOD BY AUTOMATED COUNT: 17.1 % (ref 11.5–14.5)
GAMMA GLOB SERPL ELPH-MCNC: 1.3 GM/DL (ref 0.6–1.6)
GLOBULIN UR ELPH-MCNC: 3.6 G/DL (ref 2.5–3.8)
GLUCOSE SERPL-MCNC: 101 MG/DL (ref 65–99)
HCT VFR BLD AUTO: 39.9 % (ref 35–49)
HGB BLD-MCNC: 13 G/DL (ref 12–15)
INSULIN SERPL-ACNC: ABNORMAL U[IU]/ML
MCH RBC QN AUTO: 29.7 PG (ref 26–32)
MCHC RBC AUTO-ENTMCNC: 32.6 G/DL (ref 32–36)
MCV RBC AUTO: 91.1 FL (ref 80–94)
PLATELET # BLD AUTO: ABNORMAL 10*3/UL (ref 150–450)
PMV BLD AUTO: 8.9 FL (ref 7.4–10.4)
POTASSIUM SERPL-SCNC: 3.7 MMOL/L (ref 3.6–5.1)
RBC # BLD AUTO: 4.39 10*6/UL (ref 4–5.4)
SODIUM SERPL-SCNC: 137 MMOL/L (ref 136–144)
WBC # BLD AUTO: 12.5 10*3/UL (ref 4.5–11.5)

## 2018-01-05 LAB
ANA SER QL IA: NORMAL
CCP IGG ANTIBODIES: <0.4 U/ML

## 2018-01-22 ENCOUNTER — CONVERSION ENCOUNTER (OUTPATIENT)
Dept: RHEUMATOLOGY | Facility: CLINIC | Age: 58
End: 2018-01-22

## 2018-02-14 RX ORDER — METOPROLOL TARTRATE 50 MG/1
TABLET, FILM COATED ORAL
Qty: 180 TABLET | Refills: 1 | Status: SHIPPED | OUTPATIENT
Start: 2018-02-14 | End: 2019-06-27 | Stop reason: SDUPTHER

## 2018-04-23 LAB
ALBUMIN SERPL-MCNC: 3.5 G/DL (ref 3.6–5.1)
ALBUMIN/GLOB SERPL: ABNORMAL {RATIO} (ref 1–2.5)
ALP SERPL-CCNC: 101 UNITS/L (ref 33–130)
ALT SERPL-CCNC: 8 UNITS/L (ref 6–29)
AST SERPL-CCNC: 11 UNITS/L (ref 10–35)
BASOPHILS # BLD AUTO: ABNORMAL 10*3/MM3 (ref 0–200)
BASOPHILS NFR BLD AUTO: 0.8 %
BILIRUB SERPL-MCNC: 0.2 MG/DL (ref 0.2–1.2)
BUN SERPL-MCNC: 13 MG/DL (ref 7–25)
BUN/CREAT SERPL: ABNORMAL (ref 6–22)
CALCIUM SERPL-MCNC: 9 MG/DL (ref 8.6–10.4)
CHLORIDE SERPL-SCNC: 104 MMOL/L (ref 98–110)
CK SERPL-CCNC: 22 UNITS/L (ref 29–143)
CO2 CONTENT VENOUS: 29 MMOL/L (ref 20–31)
CONV NEUTROPHILS/100 LEUKOCYTES IN BODY FLUID BY MANUAL COUNT: 73.7 %
CONV TOTAL PROTEIN: 6.5 G/DL (ref 6.1–8.1)
CREAT UR-MCNC: 0.66 MG/DL (ref 0.5–1.05)
CRP SERPL-MCNC: 1.03 MG/DL
EOSINOPHIL # BLD AUTO: 1.2 %
EOSINOPHIL # BLD AUTO: ABNORMAL 10*3/MM3 (ref 15–500)
ERYTHROCYTE [DISTWIDTH] IN BLOOD BY AUTOMATED COUNT: 14.1 % (ref 11–15)
ERYTHROCYTE [SEDIMENTATION RATE] IN BLOOD BY WESTERGREN METHOD: 17 MM/HR
GLOBULIN UR ELPH-MCNC: ABNORMAL G/DL (ref 1.9–3.7)
GLUCOSE SERPL-MCNC: 199 MG/DL (ref 65–139)
HCT VFR BLD AUTO: 38.4 % (ref 35–45)
HGB BLD-MCNC: 12.5 G/DL (ref 11.7–15.5)
LYMPHOCYTES # BLD AUTO: ABNORMAL 10*3/MM3 (ref 850–3900)
LYMPHOCYTES NFR BLD AUTO: 18.2 %
MCH RBC QN AUTO: 28.8 PG (ref 27–33)
MCHC RBC AUTO-ENTMCNC: ABNORMAL % (ref 32–36)
MCV RBC AUTO: 88.5 FL (ref 80–100)
MONOCYTES # BLD AUTO: ABNORMAL 10*3/MICROLITER (ref 200–950)
MONOCYTES NFR BLD AUTO: 6.1 %
NEUTROPHILS # BLD AUTO: ABNORMAL 10*3/MM3 (ref 1500–7800)
PLATELET # BLD AUTO: ABNORMAL 10*3/MM3 (ref 140–400)
PMV BLD AUTO: 10.7 FL (ref 7.5–12.5)
POTASSIUM SERPL-SCNC: 3.8 MMOL/L (ref 3.5–5.3)
RBC # BLD AUTO: ABNORMAL 10*6/MM3 (ref 3.8–5.1)
SODIUM SERPL-SCNC: 139 MMOL/L (ref 135–146)
WBC # BLD AUTO: ABNORMAL K/UL (ref 3.8–10.8)

## 2018-07-13 ENCOUNTER — HOSPITAL ENCOUNTER (OUTPATIENT)
Dept: LAB | Facility: HOSPITAL | Age: 58
Discharge: HOME | End: 2018-07-13
Attending: NURSE PRACTITIONER | Admitting: NURSE PRACTITIONER

## 2018-07-13 LAB — CALCIUM SERPL-MCNC: 9 MG/DL (ref 8.9–10.3)

## 2018-08-01 ENCOUNTER — HOSPITAL ENCOUNTER (OUTPATIENT)
Dept: LAB | Facility: HOSPITAL | Age: 58
Discharge: HOME | End: 2018-08-01
Attending: NURSE PRACTITIONER | Admitting: NURSE PRACTITIONER

## 2018-08-01 ENCOUNTER — CONVERSION ENCOUNTER (OUTPATIENT)
Dept: RHEUMATOLOGY | Facility: CLINIC | Age: 58
End: 2018-08-01

## 2018-08-01 LAB
ALBUMIN SERPL-MCNC: 3.1 G/DL (ref 3.5–4.8)
ALBUMIN/GLOB SERPL: 0.9 {RATIO} (ref 1–1.7)
ALP SERPL-CCNC: 73 IU/L (ref 32–91)
ALT SERPL-CCNC: 13 IU/L (ref 14–54)
ANION GAP SERPL CALC-SCNC: 11.9 MMOL/L (ref 10–20)
AST SERPL-CCNC: 20 IU/L (ref 15–41)
BACTERIA SPEC AEROBE CULT: NORMAL
BASOPHILS # BLD AUTO: 0.1 10*3/UL (ref 0–0.2)
BASOPHILS NFR BLD AUTO: 1 % (ref 0–2)
BILIRUB SERPL-MCNC: 0.4 MG/DL (ref 0.3–1.2)
BILIRUB UR QL STRIP: NEGATIVE MG/DL
BUN SERPL-MCNC: 6 MG/DL (ref 8–20)
BUN/CREAT SERPL: 7.5 (ref 5.4–26.2)
CALCIUM SERPL-MCNC: 9.8 MG/DL (ref 8.9–10.3)
CASTS URNS QL MICRO: ABNORMAL /[LPF]
CHLORIDE SERPL-SCNC: 98 MMOL/L (ref 101–111)
COLOR UR: YELLOW
CONV BACTERIA IN URINE MICRO: ABNORMAL
CONV CLARITY OF URINE: CLEAR
CONV CO2: 31 MMOL/L (ref 22–32)
CONV HYALINE CASTS IN URINE MICRO: 5 /[LPF] (ref 0–5)
CONV PROTEIN IN URINE BY AUTOMATED TEST STRIP: NEGATIVE MG/DL
CONV SMALL ROUND CELLS: ABNORMAL /[HPF]
CONV TOTAL PROTEIN: 6.5 G/DL (ref 6.1–7.9)
CONV UROBILINOGEN IN URINE BY AUTOMATED TEST STRIP: 0.2 MG/DL
CREAT UR-MCNC: 0.8 MG/DL (ref 0.4–1)
CRP SERPL-MCNC: 1.66 MG/DL (ref 0–0.7)
CULTURE INDICATED?: ABNORMAL
DIFFERENTIAL METHOD BLD: (no result)
EOSINOPHIL # BLD AUTO: 0.2 10*3/UL (ref 0–0.3)
EOSINOPHIL # BLD AUTO: 2 % (ref 0–3)
ERYTHROCYTE [DISTWIDTH] IN BLOOD BY AUTOMATED COUNT: 15.5 % (ref 11.5–14.5)
GLOBULIN UR ELPH-MCNC: 3.4 G/DL (ref 2.5–3.8)
GLUCOSE SERPL-MCNC: 235 MG/DL (ref 65–99)
GLUCOSE UR QL: 250 MG/DL
HCT VFR BLD AUTO: 38.4 % (ref 35–49)
HGB BLD-MCNC: 12.2 G/DL (ref 12–15)
HGB UR QL STRIP: NEGATIVE
KETONES UR QL STRIP: NEGATIVE MG/DL
LEUKOCYTE ESTERASE UR QL STRIP: NEGATIVE
LYMPHOCYTES # BLD AUTO: 1.7 10*3/UL (ref 0.8–4.8)
LYMPHOCYTES NFR BLD AUTO: 18 % (ref 18–42)
Lab: NORMAL
MCH RBC QN AUTO: 27.8 PG (ref 26–32)
MCHC RBC AUTO-ENTMCNC: 31.8 G/DL (ref 32–36)
MCV RBC AUTO: 87.5 FL (ref 80–94)
MICRO REPORT STATUS: NORMAL
MONOCYTES # BLD AUTO: 0.7 10*3/UL (ref 0.1–1.3)
MONOCYTES NFR BLD AUTO: 8 % (ref 2–11)
NEUTROPHILS # BLD AUTO: 6.9 10*3/UL (ref 2.3–8.6)
NEUTROPHILS NFR BLD AUTO: 71 % (ref 50–75)
NITRITE UR QL STRIP: NEGATIVE
NRBC BLD AUTO-RTO: 0 /100{WBCS}
NRBC/RBC NFR BLD MANUAL: 0 10*3/UL
PH UR STRIP.AUTO: 6 [PH] (ref 4.5–8)
PLATELET # BLD AUTO: 332 10*3/UL (ref 150–450)
PMV BLD AUTO: 8.9 FL (ref 7.4–10.4)
POTASSIUM SERPL-SCNC: 3.9 MMOL/L (ref 3.6–5.1)
RBC # BLD AUTO: 4.39 10*6/UL (ref 4–5.4)
RBC #/AREA URNS HPF: 0 /[HPF] (ref 0–3)
SODIUM SERPL-SCNC: 137 MMOL/L (ref 136–144)
SP GR UR: 1.02 (ref 1–1.03)
SPECIMEN SOURCE: NORMAL
SPERM URNS QL MICRO: ABNORMAL /[HPF]
SQUAMOUS SPT QL MICRO: 5 /[HPF] (ref 0–5)
UNIDENT CRYS URNS QL MICRO: ABNORMAL /[HPF]
WBC # BLD AUTO: 9.6 10*3/UL (ref 4.5–11.5)
WBC #/AREA URNS HPF: 2 /[HPF] (ref 0–5)
YEAST SPEC QL WET PREP: ABNORMAL /[HPF]

## 2018-08-03 LAB
C3 SERPL-MCNC: 137 MG/DL (ref 79–152)
C4 SERPL-MCNC: 23.9 MG/DL (ref 18–55)

## 2018-08-23 ENCOUNTER — HOSPITAL ENCOUNTER (OUTPATIENT)
Dept: OTHER | Facility: HOSPITAL | Age: 58
Setting detail: RECURRING SERIES
Discharge: HOME | End: 2018-10-21
Attending: NURSE PRACTITIONER | Admitting: NURSE PRACTITIONER

## 2018-08-25 ENCOUNTER — HOSPITAL ENCOUNTER (OUTPATIENT)
Dept: SLEEP MEDICINE | Facility: HOSPITAL | Age: 58
Discharge: HOME | End: 2018-08-25
Attending: NURSE PRACTITIONER | Admitting: NURSE PRACTITIONER

## 2018-09-09 ENCOUNTER — HOSPITAL ENCOUNTER (OUTPATIENT)
Dept: SLEEP MEDICINE | Facility: HOSPITAL | Age: 58
Discharge: HOME | End: 2018-09-09
Attending: INTERNAL MEDICINE | Admitting: INTERNAL MEDICINE

## 2019-01-04 ENCOUNTER — OFFICE (AMBULATORY)
Dept: URBAN - METROPOLITAN AREA CLINIC 64 | Facility: CLINIC | Age: 59
End: 2019-01-04
Payer: MEDICAID

## 2019-01-04 VITALS
HEART RATE: 81 BPM | HEIGHT: 61 IN | SYSTOLIC BLOOD PRESSURE: 112 MMHG | WEIGHT: 161 LBS | DIASTOLIC BLOOD PRESSURE: 63 MMHG

## 2019-01-04 DIAGNOSIS — R14.0 ABDOMINAL DISTENSION (GASEOUS): ICD-10-CM

## 2019-01-04 DIAGNOSIS — K31.84 GASTROPARESIS: ICD-10-CM

## 2019-01-04 PROCEDURE — 99214 OFFICE O/P EST MOD 30 MIN: CPT | Performed by: NURSE PRACTITIONER

## 2019-01-04 RX ORDER — METOCLOPRAMIDE 10 MG/1
30 TABLET ORAL
Qty: 90 | Refills: 5 | Status: COMPLETED
Start: 2019-01-04 | End: 2019-05-13

## 2019-02-04 ENCOUNTER — HOSPITAL ENCOUNTER (OUTPATIENT)
Dept: LAB | Facility: HOSPITAL | Age: 59
Discharge: HOME | End: 2019-02-04
Attending: NURSE PRACTITIONER | Admitting: NURSE PRACTITIONER

## 2019-02-04 ENCOUNTER — CONVERSION ENCOUNTER (OUTPATIENT)
Dept: RHEUMATOLOGY | Facility: CLINIC | Age: 59
End: 2019-02-04

## 2019-02-08 ENCOUNTER — CONVERSION ENCOUNTER (OUTPATIENT)
Dept: RHEUMATOLOGY | Facility: CLINIC | Age: 59
End: 2019-02-08

## 2019-03-11 RX ORDER — METOPROLOL TARTRATE 50 MG/1
TABLET, FILM COATED ORAL
Qty: 180 TABLET | Refills: 1 | OUTPATIENT
Start: 2019-03-11

## 2019-03-18 RX ORDER — METOPROLOL TARTRATE 50 MG/1
TABLET, FILM COATED ORAL
Qty: 180 TABLET | Refills: 1 | OUTPATIENT
Start: 2019-03-18

## 2019-04-26 ENCOUNTER — HOSPITAL ENCOUNTER (OUTPATIENT)
Dept: LAB | Facility: HOSPITAL | Age: 59
Discharge: HOME | End: 2019-04-26
Attending: NURSE PRACTITIONER | Admitting: NURSE PRACTITIONER

## 2019-04-26 LAB
ALBUMIN SERPL-MCNC: 3.4 G/DL (ref 3.5–4.8)
ALBUMIN/GLOB SERPL: 0.9 {RATIO} (ref 1–1.7)
ALP SERPL-CCNC: 75 IU/L (ref 32–91)
ALT SERPL-CCNC: 31 IU/L (ref 14–54)
ANION GAP SERPL CALC-SCNC: 14.7 MMOL/L (ref 10–20)
AST SERPL-CCNC: 37 IU/L (ref 15–41)
BASOPHILS # BLD AUTO: 0.1 10*3/UL (ref 0–0.2)
BASOPHILS NFR BLD AUTO: 1 % (ref 0–2)
BILIRUB SERPL-MCNC: 0.5 MG/DL (ref 0.3–1.2)
BILIRUB UR QL STRIP: NEGATIVE MG/DL
BUN SERPL-MCNC: 6 MG/DL (ref 8–20)
BUN/CREAT SERPL: 7.5 (ref 5.4–26.2)
CALCIUM SERPL-MCNC: 9 MG/DL (ref 8.9–10.3)
CASTS URNS QL MICRO: ABNORMAL /[LPF]
CHLORIDE SERPL-SCNC: 102 MMOL/L (ref 101–111)
COLOR UR: YELLOW
CONV BACTERIA IN URINE MICRO: NEGATIVE
CONV CLARITY OF URINE: CLEAR
CONV CO2: 24 MMOL/L (ref 22–32)
CONV HYALINE CASTS IN URINE MICRO: 5 /[LPF] (ref 0–5)
CONV PROTEIN IN URINE BY AUTOMATED TEST STRIP: NEGATIVE MG/DL
CONV SMALL ROUND CELLS: ABNORMAL /[HPF]
CONV TOTAL PROTEIN: 7 G/DL (ref 6.1–7.9)
CONV UROBILINOGEN IN URINE BY AUTOMATED TEST STRIP: 0.2 MG/DL
CREAT UR-MCNC: 0.8 MG/DL (ref 0.4–1)
CRP SERPL-MCNC: 1.04 MG/DL (ref 0–0.7)
CULTURE INDICATED?: ABNORMAL
DIFFERENTIAL METHOD BLD: (no result)
EOSINOPHIL # BLD AUTO: 0.1 10*3/UL (ref 0–0.3)
EOSINOPHIL # BLD AUTO: 1 % (ref 0–3)
ERYTHROCYTE [DISTWIDTH] IN BLOOD BY AUTOMATED COUNT: 16.9 % (ref 11.5–14.5)
ERYTHROCYTE [SEDIMENTATION RATE] IN BLOOD BY WESTERGREN METHOD: 25 MM/HR (ref 0–30)
GLOBULIN UR ELPH-MCNC: 3.6 G/DL (ref 2.5–3.8)
GLUCOSE SERPL-MCNC: 254 MG/DL (ref 65–99)
GLUCOSE UR QL: 100 MG/DL
HCT VFR BLD AUTO: 41.2 % (ref 35–49)
HGB BLD-MCNC: 13.6 G/DL (ref 12–15)
HGB UR QL STRIP: NEGATIVE
KETONES UR QL STRIP: NEGATIVE MG/DL
LEUKOCYTE ESTERASE UR QL STRIP: NEGATIVE
LYMPHOCYTES # BLD AUTO: 2.1 10*3/UL (ref 0.8–4.8)
LYMPHOCYTES NFR BLD AUTO: 21 % (ref 18–42)
MCH RBC QN AUTO: 28.6 PG (ref 26–32)
MCHC RBC AUTO-ENTMCNC: 33 G/DL (ref 32–36)
MCV RBC AUTO: 86.7 FL (ref 80–94)
MONOCYTES # BLD AUTO: 0.6 10*3/UL (ref 0.1–1.3)
MONOCYTES NFR BLD AUTO: 6 % (ref 2–11)
NEUTROPHILS # BLD AUTO: 7.3 10*3/UL (ref 2.3–8.6)
NEUTROPHILS NFR BLD AUTO: 71 % (ref 50–75)
NITRITE UR QL STRIP: NEGATIVE
NRBC BLD AUTO-RTO: 0 /100{WBCS}
NRBC/RBC NFR BLD MANUAL: 0 10*3/UL
PH UR STRIP.AUTO: 6 [PH] (ref 4.5–8)
PLATELET # BLD AUTO: 322 10*3/UL (ref 150–450)
PMV BLD AUTO: 8.8 FL (ref 7.4–10.4)
POTASSIUM SERPL-SCNC: 3.7 MMOL/L (ref 3.6–5.1)
RBC # BLD AUTO: 4.75 10*6/UL (ref 4–5.4)
RBC #/AREA URNS HPF: 6 /[HPF] (ref 0–3)
SODIUM SERPL-SCNC: 137 MMOL/L (ref 136–144)
SP GR UR: 1.02 (ref 1–1.03)
SPERM URNS QL MICRO: ABNORMAL /[HPF]
SQUAMOUS SPT QL MICRO: 6 /[HPF] (ref 0–5)
UNIDENT CRYS URNS QL MICRO: ABNORMAL /[HPF]
WBC # BLD AUTO: 10.2 10*3/UL (ref 4.5–11.5)
WBC #/AREA URNS HPF: 4 /[HPF] (ref 0–5)
YEAST SPEC QL WET PREP: ABNORMAL /[HPF]

## 2019-04-29 LAB
C3 SERPL-MCNC: 136 MG/DL (ref 79–152)
C4 SERPL-MCNC: 23.3 MG/DL (ref 18–55)

## 2019-05-13 ENCOUNTER — OFFICE (AMBULATORY)
Dept: URBAN - METROPOLITAN AREA CLINIC 64 | Facility: CLINIC | Age: 59
End: 2019-05-13

## 2019-05-13 VITALS
HEIGHT: 61 IN | HEART RATE: 74 BPM | SYSTOLIC BLOOD PRESSURE: 119 MMHG | WEIGHT: 171 LBS | DIASTOLIC BLOOD PRESSURE: 64 MMHG

## 2019-05-13 DIAGNOSIS — R68.81 EARLY SATIETY: ICD-10-CM

## 2019-05-13 DIAGNOSIS — R14.0 ABDOMINAL DISTENSION (GASEOUS): ICD-10-CM

## 2019-05-13 DIAGNOSIS — R10.10 UPPER ABDOMINAL PAIN, UNSPECIFIED: ICD-10-CM

## 2019-05-13 DIAGNOSIS — K31.84 GASTROPARESIS: ICD-10-CM

## 2019-05-13 PROCEDURE — 99213 OFFICE O/P EST LOW 20 MIN: CPT | Performed by: INTERNAL MEDICINE

## 2019-05-13 RX ORDER — METOCLOPRAMIDE HYDROCHLORIDE 10 MG/1
40 TABLET ORAL
Qty: 360 | Refills: 5 | Status: COMPLETED
End: 2020-01-28

## 2019-05-13 RX ORDER — OMEPRAZOLE 40 MG/1
CAPSULE, DELAYED RELEASE ORAL
Qty: 90 | Refills: 4 | Status: ACTIVE

## 2019-05-22 ENCOUNTER — ON CAMPUS - OUTPATIENT (AMBULATORY)
Dept: URBAN - METROPOLITAN AREA HOSPITAL 2 | Facility: HOSPITAL | Age: 59
End: 2019-05-22

## 2019-05-22 VITALS
SYSTOLIC BLOOD PRESSURE: 155 MMHG | SYSTOLIC BLOOD PRESSURE: 129 MMHG | HEIGHT: 61 IN | DIASTOLIC BLOOD PRESSURE: 74 MMHG | RESPIRATION RATE: 16 BRPM | OXYGEN SATURATION: 97 % | OXYGEN SATURATION: 93 % | RESPIRATION RATE: 17 BRPM | HEART RATE: 79 BPM | RESPIRATION RATE: 20 BRPM | HEART RATE: 78 BPM | HEART RATE: 83 BPM | SYSTOLIC BLOOD PRESSURE: 100 MMHG | DIASTOLIC BLOOD PRESSURE: 99 MMHG | TEMPERATURE: 97.6 F | HEART RATE: 77 BPM | OXYGEN SATURATION: 99 % | WEIGHT: 170 LBS | OXYGEN SATURATION: 98 % | OXYGEN SATURATION: 92 % | SYSTOLIC BLOOD PRESSURE: 162 MMHG | HEART RATE: 86 BPM | SYSTOLIC BLOOD PRESSURE: 99 MMHG | DIASTOLIC BLOOD PRESSURE: 50 MMHG | DIASTOLIC BLOOD PRESSURE: 55 MMHG | DIASTOLIC BLOOD PRESSURE: 81 MMHG

## 2019-05-22 DIAGNOSIS — R68.81 EARLY SATIETY: ICD-10-CM

## 2019-05-22 DIAGNOSIS — K22.2 ESOPHAGEAL OBSTRUCTION: ICD-10-CM

## 2019-05-22 DIAGNOSIS — R13.19 OTHER DYSPHAGIA: ICD-10-CM

## 2019-05-22 DIAGNOSIS — K31.84 GASTROPARESIS: ICD-10-CM

## 2019-05-22 DIAGNOSIS — R10.10 UPPER ABDOMINAL PAIN, UNSPECIFIED: ICD-10-CM

## 2019-05-22 DIAGNOSIS — K44.9 DIAPHRAGMATIC HERNIA WITHOUT OBSTRUCTION OR GANGRENE: ICD-10-CM

## 2019-05-22 PROCEDURE — 43235 EGD DIAGNOSTIC BRUSH WASH: CPT | Performed by: INTERNAL MEDICINE

## 2019-05-22 PROCEDURE — 43450 DILATE ESOPHAGUS 1/MULT PASS: CPT | Performed by: INTERNAL MEDICINE

## 2019-06-04 VITALS
SYSTOLIC BLOOD PRESSURE: 114 MMHG | SYSTOLIC BLOOD PRESSURE: 102 MMHG | HEART RATE: 86 BPM | WEIGHT: 156 LBS | SYSTOLIC BLOOD PRESSURE: 100 MMHG | WEIGHT: 176 LBS | SYSTOLIC BLOOD PRESSURE: 112 MMHG | HEIGHT: 61 IN | BODY MASS INDEX: 28.53 KG/M2 | DIASTOLIC BLOOD PRESSURE: 68 MMHG | HEIGHT: 61 IN | HEART RATE: 62 BPM | DIASTOLIC BLOOD PRESSURE: 60 MMHG | HEIGHT: 61 IN | SYSTOLIC BLOOD PRESSURE: 101 MMHG | HEART RATE: 79 BPM | WEIGHT: 164 LBS | DIASTOLIC BLOOD PRESSURE: 68 MMHG | DIASTOLIC BLOOD PRESSURE: 63 MMHG | BODY MASS INDEX: 30.96 KG/M2 | WEIGHT: 164 LBS | SYSTOLIC BLOOD PRESSURE: 113 MMHG | HEART RATE: 85 BPM | BODY MASS INDEX: 28.32 KG/M2 | BODY MASS INDEX: 33.23 KG/M2 | DIASTOLIC BLOOD PRESSURE: 70 MMHG | WEIGHT: 150 LBS | HEIGHT: 61 IN | HEART RATE: 92 BPM | BODY MASS INDEX: 28.32 KG/M2 | SYSTOLIC BLOOD PRESSURE: 124 MMHG | HEART RATE: 64 BPM | DIASTOLIC BLOOD PRESSURE: 64 MMHG | BODY MASS INDEX: 28.32 KG/M2 | BODY MASS INDEX: 30 KG/M2 | WEIGHT: 167 LBS | DIASTOLIC BLOOD PRESSURE: 75 MMHG | HEART RATE: 65 BPM | BODY MASS INDEX: 31.53 KG/M2 | HEIGHT: 61 IN | WEIGHT: 150 LBS | WEIGHT: 150 LBS | HEIGHT: 61 IN

## 2019-06-27 PROBLEM — J44.9 CAFL (CHRONIC AIRFLOW LIMITATION) (HCC): Status: ACTIVE | Noted: 2019-06-27

## 2019-06-27 PROBLEM — K31.84 GASTROPARESIS: Status: ACTIVE | Noted: 2019-02-04

## 2019-06-27 PROBLEM — E07.9 THYROID DISORDER: Status: ACTIVE | Noted: 2019-02-04

## 2019-06-27 PROBLEM — I50.22 CHRONIC SYSTOLIC HEART FAILURE (HCC): Status: ACTIVE | Noted: 2019-06-27

## 2019-06-27 PROBLEM — I05.2 MITRAL STENOSIS WITH INSUFFICIENCY, RHEUMATIC: Status: ACTIVE | Noted: 2019-06-27

## 2019-06-27 PROBLEM — J44.9 COPD, MODERATE: Status: ACTIVE | Noted: 2019-06-27

## 2019-06-27 PROBLEM — M81.0 OSTEOPOROSIS: Status: ACTIVE | Noted: 2017-08-16

## 2019-06-27 PROBLEM — Z86.73 HISTORY OF STROKE: Status: ACTIVE | Noted: 2018-01-22

## 2019-06-27 PROBLEM — R76.8 ELEVATED ANTINUCLEAR ANTIBODY (ANA) LEVEL: Status: ACTIVE | Noted: 2017-08-16

## 2019-06-27 PROBLEM — G83.9 PARESIS (HCC): Status: ACTIVE | Noted: 2018-04-23

## 2019-06-27 PROBLEM — I07.1 TI (TRICUSPID INCOMPETENCE): Status: ACTIVE | Noted: 2019-06-27

## 2019-08-12 ENCOUNTER — LAB (OUTPATIENT)
Dept: LAB | Facility: HOSPITAL | Age: 59
End: 2019-08-12

## 2019-08-12 ENCOUNTER — CLINICAL SUPPORT (OUTPATIENT)
Dept: RHEUMATOLOGY | Facility: CLINIC | Age: 59
End: 2019-08-12

## 2019-08-12 VITALS
WEIGHT: 162 LBS | HEIGHT: 61 IN | BODY MASS INDEX: 30.58 KG/M2 | HEART RATE: 69 BPM | SYSTOLIC BLOOD PRESSURE: 110 MMHG | DIASTOLIC BLOOD PRESSURE: 64 MMHG

## 2019-08-12 DIAGNOSIS — M19.90 INFLAMMATORY ARTHRITIS: ICD-10-CM

## 2019-08-12 DIAGNOSIS — R76.8 ELEVATED ANTINUCLEAR ANTIBODY (ANA) LEVEL: ICD-10-CM

## 2019-08-12 DIAGNOSIS — M81.0 OSTEOPOROSIS, UNSPECIFIED OSTEOPOROSIS TYPE, UNSPECIFIED PATHOLOGICAL FRACTURE PRESENCE: ICD-10-CM

## 2019-08-12 DIAGNOSIS — M19.90 INFLAMMATORY ARTHRITIS: Primary | ICD-10-CM

## 2019-08-12 LAB
ALBUMIN SERPL-MCNC: 3.3 G/DL (ref 3.5–4.8)
ALBUMIN/GLOB SERPL: 0.9 G/DL (ref 1–1.7)
ALP SERPL-CCNC: 82 U/L (ref 32–91)
ALT SERPL W P-5'-P-CCNC: 22 U/L (ref 14–54)
ANION GAP SERPL CALCULATED.3IONS-SCNC: 13.4 MMOL/L (ref 5–15)
AST SERPL-CCNC: 27 U/L (ref 15–41)
BASOPHILS # BLD AUTO: 0.1 10*3/MM3 (ref 0–0.2)
BASOPHILS NFR BLD AUTO: 1.2 % (ref 0–1.5)
BILIRUB SERPL-MCNC: 0.3 MG/DL (ref 0.3–1.2)
BILIRUB UR QL STRIP: NEGATIVE
BUN BLD-MCNC: 4 MG/DL (ref 8–20)
BUN/CREAT SERPL: 4.4 (ref 5.4–26.2)
CALCIUM SPEC-SCNC: 9.1 MG/DL (ref 8.9–10.3)
CHLORIDE SERPL-SCNC: 104 MMOL/L (ref 101–111)
CLARITY UR: CLEAR
CO2 SERPL-SCNC: 22 MMOL/L (ref 22–32)
COLOR UR: YELLOW
CREAT BLD-MCNC: 0.9 MG/DL (ref 0.4–1)
CRP SERPL-MCNC: 1.83 MG/DL (ref 0–0.7)
DEPRECATED RDW RBC AUTO: 52.1 FL (ref 37–54)
EOSINOPHIL # BLD AUTO: 0.1 10*3/MM3 (ref 0–0.4)
EOSINOPHIL NFR BLD AUTO: 1.4 % (ref 0.3–6.2)
ERYTHROCYTE [DISTWIDTH] IN BLOOD BY AUTOMATED COUNT: 16.3 % (ref 12.3–15.4)
ERYTHROCYTE [SEDIMENTATION RATE] IN BLOOD: 23 MM/HR (ref 0–30)
GFR SERPL CREATININE-BSD FRML MDRD: 65 ML/MIN/1.73
GLOBULIN UR ELPH-MCNC: 3.6 GM/DL (ref 2.5–3.8)
GLUCOSE BLD-MCNC: 241 MG/DL (ref 65–99)
GLUCOSE UR STRIP-MCNC: NEGATIVE MG/DL
HCT VFR BLD AUTO: 44.1 % (ref 34–46.6)
HGB BLD-MCNC: 14.2 G/DL (ref 12–15.9)
HGB UR QL STRIP.AUTO: NEGATIVE
KETONES UR QL STRIP: NEGATIVE
LEUKOCYTE ESTERASE UR QL STRIP.AUTO: NEGATIVE
LYMPHOCYTES # BLD AUTO: 1.8 10*3/MM3 (ref 0.7–3.1)
LYMPHOCYTES NFR BLD AUTO: 20.1 % (ref 19.6–45.3)
MCH RBC QN AUTO: 29.3 PG (ref 26.6–33)
MCHC RBC AUTO-ENTMCNC: 32.2 G/DL (ref 31.5–35.7)
MCV RBC AUTO: 91.1 FL (ref 79–97)
MONOCYTES # BLD AUTO: 0.6 10*3/MM3 (ref 0.1–0.9)
MONOCYTES NFR BLD AUTO: 7 % (ref 5–12)
NEUTROPHILS # BLD AUTO: 6.3 10*3/MM3 (ref 1.7–7)
NEUTROPHILS NFR BLD AUTO: 70.3 % (ref 42.7–76)
NITRITE UR QL STRIP: NEGATIVE
NRBC BLD AUTO-RTO: 0 /100 WBC (ref 0–0.2)
PH UR STRIP.AUTO: 6 [PH] (ref 5–8)
PLATELET # BLD AUTO: 289 10*3/MM3 (ref 140–450)
PMV BLD AUTO: 9.2 FL (ref 6–12)
POTASSIUM BLD-SCNC: 3.4 MMOL/L (ref 3.6–5.1)
PROT SERPL-MCNC: 6.9 G/DL (ref 6.1–7.9)
PROT UR QL STRIP: NEGATIVE
RBC # BLD AUTO: 4.85 10*6/MM3 (ref 3.77–5.28)
SODIUM BLD-SCNC: 136 MMOL/L (ref 136–144)
SP GR UR STRIP: 1.01 (ref 1–1.03)
UROBILINOGEN UR QL STRIP: NORMAL
WBC NRBC COR # BLD: 9 10*3/MM3 (ref 3.4–10.8)

## 2019-08-12 PROCEDURE — 86431 RHEUMATOID FACTOR QUANT: CPT | Performed by: NURSE PRACTITIONER

## 2019-08-12 PROCEDURE — 86200 CCP ANTIBODY: CPT | Performed by: NURSE PRACTITIONER

## 2019-08-12 PROCEDURE — 86160 COMPLEMENT ANTIGEN: CPT | Performed by: NURSE PRACTITIONER

## 2019-08-12 PROCEDURE — 85027 COMPLETE CBC AUTOMATED: CPT | Performed by: NURSE PRACTITIONER

## 2019-08-12 PROCEDURE — 99213 OFFICE O/P EST LOW 20 MIN: CPT | Performed by: NURSE PRACTITIONER

## 2019-08-12 PROCEDURE — 80053 COMPREHEN METABOLIC PANEL: CPT | Performed by: NURSE PRACTITIONER

## 2019-08-12 PROCEDURE — 85652 RBC SED RATE AUTOMATED: CPT | Performed by: NURSE PRACTITIONER

## 2019-08-12 PROCEDURE — 36415 COLL VENOUS BLD VENIPUNCTURE: CPT

## 2019-08-12 PROCEDURE — 81003 URINALYSIS AUTO W/O SCOPE: CPT | Performed by: NURSE PRACTITIONER

## 2019-08-12 PROCEDURE — 86140 C-REACTIVE PROTEIN: CPT | Performed by: NURSE PRACTITIONER

## 2019-08-12 PROCEDURE — 82306 VITAMIN D 25 HYDROXY: CPT | Performed by: NURSE PRACTITIONER

## 2019-08-12 NOTE — PROGRESS NOTES
Subjective   Nighat Montoya is a 59 y.o. female.     History of Present Illness   Pt is a pleasant 58 y.o. female pt who is here for follow up today. She has history of inflammatory arthritis, +DENIS and osteoporosis. She is taking azulfidine 1 tab po BID and claims that she is doing better. with the medications.  Has less joint pain since starting this. She took fosamax in the past for her bone loss but has stopped taking this and started the prolia. Has toleratd the prolia well so far and her last prolia injection was in February and she is due for the prolia.  . Denies fractures. Current everyday smoker.  +Fam history of osteoporosis. She had a dexa scan 1-4-18 that showed osteoporosis, L1-L4 t-score was -3.3 with high risk of fracture. Denies infections, known malignancies or planned dental work. Takes daily calcium + vitamin D.  Labs on 1-29-19 showed mild anemia, elevated TSH, normal creatinine, normal AST/ALT, normal calcium.  No recent labs.    intermittent fever and chills. No nasal or oral lesions. Denies dry eyes, no skin rashes or OS. She has borderline diabetes and tries to watch her diet. No CP. Intermittent SOA, sees pulmonologist Q3 months (Dr. Monet) Wears CPAP for her sleep apnea. Current everyday smoker, smokers 1/2 PPD.  She Denies N/V/D. She does have gastroparesis. Sees Dr. Fernandez. Denies history of UC or crohns. Low energy.  The remainder of the review of systems reviewed and are (-).  Hx of CVA in December 2017 uses wheechair.                   The following portions of the patient's history were reviewed and updated as appropriate: allergies, current medications, past family history, past medical history, past social history, past surgical history and problem list.    Review of Systems   Constitutional:        See HPI       Objective   Physical Exam   Constitutional: She is oriented to person, place, and time. She appears well-developed and well-nourished.   HENT:   Head: Normocephalic and  atraumatic.   Nose: Nose normal.   Eyes: Conjunctivae and EOM are normal. Pupils are equal, round, and reactive to light.   Cardiovascular: Normal rate and regular rhythm.   Pulmonary/Chest: Effort normal and breath sounds normal.   Musculoskeletal:   Pt has decreased ROM of the bilateral shoulders, lumbar spine, ricky knees  She is non tender on exam  No swelling or synovitis is noted on exam.   Neurological: She is alert and oriented to person, place, and time.   Skin: Skin is warm and dry.   Psychiatric: She has a normal mood and affect. Her behavior is normal.         Assessment/Plan   Nighat was seen today for joint pain.    Diagnoses and all orders for this visit:    Inflammatory arthritis  Comments:  Stable, claims she is doing well w/ current therapy  Continue meds  Labs  Orders:  -     CBC With Manual Differential; Future  -     Comprehensive Metabolic Panel; Future  -     C-reactive Protein; Future  -     Sedimentation Rate; Future  -     Rheumatoid Factor; Future  -     Cyclic Citrul Peptide Antibody, IgG / IgA; Future  -     Vitamin D 25 Hydroxy; Future  -     C4 Complement; Future  -     C3 Complement; Future  -     Urinalysis With Culture If Indicated -; Future    Elevated antinuclear antibody (DENIS) level  Comments:  Labs today  Orders:  -     CBC With Manual Differential; Future  -     Comprehensive Metabolic Panel; Future  -     C-reactive Protein; Future  -     Sedimentation Rate; Future  -     Rheumatoid Factor; Future  -     Cyclic Citrul Peptide Antibody, IgG / IgA; Future  -     Vitamin D 25 Hydroxy; Future  -     C4 Complement; Future  -     C3 Complement; Future  -     Urinalysis With Culture If Indicated -; Future    Osteoporosis, unspecified osteoporosis type, unspecified pathological fracture presence  Comments:  Due for prolia--> due for labs  Orders:  -     CBC With Manual Differential; Future  -     Comprehensive Metabolic Panel; Future  -     C-reactive Protein; Future  -     Sedimentation  Rate; Future  -     Rheumatoid Factor; Future  -     Cyclic Citrul Peptide Antibody, IgG / IgA; Future  -     Vitamin D 25 Hydroxy; Future  -     C4 Complement; Future  -     C3 Complement; Future  -     Urinalysis With Culture If Indicated -; Future        Patient Instructions   Stable on SSZ 1 tab/d  Labs today  If labs ok may reschedule prolia   RTO 3-4 months

## 2019-08-13 DIAGNOSIS — E55.9 VITAMIN D DEFICIENCY: ICD-10-CM

## 2019-08-13 DIAGNOSIS — M81.0 OSTEOPOROSIS, UNSPECIFIED OSTEOPOROSIS TYPE, UNSPECIFIED PATHOLOGICAL FRACTURE PRESENCE: Primary | ICD-10-CM

## 2019-08-13 LAB
25(OH)D3 SERPL-MCNC: 18.9 NG/ML (ref 30–100)
CYCLIC CITRULLINATED PEPTIDE ANTIBODY: < 0.4

## 2019-08-14 LAB
C3 SERPL-MCNC: 133 MG/DL (ref 79–152)
C4 SERPL-MCNC: 26.2 MG/DL (ref 18–55)
CHROMATIN AB SERPL-ACNC: <20 IU/ML (ref 0–20)

## 2019-08-16 ENCOUNTER — TELEPHONE (OUTPATIENT)
Dept: RHEUMATOLOGY | Facility: CLINIC | Age: 59
End: 2019-08-16

## 2019-08-16 RX ORDER — ERGOCALCIFEROL 1.25 MG/1
50000 CAPSULE ORAL WEEKLY
Qty: 12 CAPSULE | Refills: 0 | Status: SHIPPED | OUTPATIENT
Start: 2019-08-16 | End: 2020-02-17

## 2019-08-16 NOTE — TELEPHONE ENCOUNTER
----- Message from OCHOA Guajardo sent at 8/13/2019  9:39 PM EDT -----  Calcium is normal, vitamin D is low at 18. Needs to supplement with 50, 000 IU once weekly X 12 weeks, then may take 2,000 IU once daily thereafter. Her glucose is elevated at 241, needs to follow up with PCP on this to have this rechecked or to have A1c done. She does need to take daily calcium (pt was asking about this during her OV). Needs to take 1200 mg/d. Her K+ is slightly low, I would recommend that she eat K+ rich foods and have labs follow up w/ her PCP.   In regards to prolia, would like to wait 4 wks & have labs rechecked.

## 2019-08-16 NOTE — TELEPHONE ENCOUNTER
See lab result note, would like pt to wait about 4 weeks prior to her prolia d/t low vitamin D level.

## 2019-11-26 ENCOUNTER — OFFICE (AMBULATORY)
Dept: URBAN - METROPOLITAN AREA CLINIC 64 | Facility: CLINIC | Age: 59
End: 2019-11-26

## 2019-11-26 VITALS
WEIGHT: 153 LBS | DIASTOLIC BLOOD PRESSURE: 66 MMHG | SYSTOLIC BLOOD PRESSURE: 128 MMHG | HEART RATE: 68 BPM | HEIGHT: 61 IN

## 2019-11-26 DIAGNOSIS — R14.0 ABDOMINAL DISTENSION (GASEOUS): ICD-10-CM

## 2019-11-26 DIAGNOSIS — Z79.01 LONG TERM (CURRENT) USE OF ANTICOAGULANTS: ICD-10-CM

## 2019-11-26 DIAGNOSIS — K59.00 CONSTIPATION, UNSPECIFIED: ICD-10-CM

## 2019-11-26 DIAGNOSIS — R10.12 LEFT UPPER QUADRANT PAIN: ICD-10-CM

## 2019-11-26 DIAGNOSIS — R68.81 EARLY SATIETY: ICD-10-CM

## 2019-11-26 DIAGNOSIS — Z95.0 PRESENCE OF CARDIAC PACEMAKER: ICD-10-CM

## 2019-11-26 DIAGNOSIS — Z90.49 ACQUIRED ABSENCE OF OTHER SPECIFIED PARTS OF DIGESTIVE TRACT: ICD-10-CM

## 2019-11-26 PROCEDURE — 99214 OFFICE O/P EST MOD 30 MIN: CPT | Performed by: NURSE PRACTITIONER

## 2019-11-26 RX ORDER — ONDANSETRON 4 MG/1
12 TABLET, ORALLY DISINTEGRATING ORAL
Qty: 90 | Refills: 4 | Status: COMPLETED
Start: 2019-11-26 | End: 2020-10-12

## 2019-11-26 RX ORDER — PRUCALOPRIDE 2 MG/1
4 TABLET, FILM COATED ORAL
Qty: 60 | Refills: 11 | Status: COMPLETED
Start: 2019-11-26 | End: 2022-05-26

## 2020-01-28 ENCOUNTER — OFFICE (AMBULATORY)
Dept: URBAN - METROPOLITAN AREA CLINIC 64 | Facility: CLINIC | Age: 60
End: 2020-01-28

## 2020-01-28 VITALS
HEIGHT: 61 IN | WEIGHT: 153 LBS | HEART RATE: 67 BPM | DIASTOLIC BLOOD PRESSURE: 63 MMHG | SYSTOLIC BLOOD PRESSURE: 122 MMHG

## 2020-01-28 DIAGNOSIS — R10.12 LEFT UPPER QUADRANT PAIN: ICD-10-CM

## 2020-01-28 DIAGNOSIS — K31.84 GASTROPARESIS: ICD-10-CM

## 2020-01-28 DIAGNOSIS — K44.9 DIAPHRAGMATIC HERNIA WITHOUT OBSTRUCTION OR GANGRENE: ICD-10-CM

## 2020-01-28 DIAGNOSIS — K59.09 OTHER CONSTIPATION: ICD-10-CM

## 2020-01-28 DIAGNOSIS — K22.2 ESOPHAGEAL OBSTRUCTION: ICD-10-CM

## 2020-01-28 PROCEDURE — 99213 OFFICE O/P EST LOW 20 MIN: CPT | Performed by: INTERNAL MEDICINE

## 2020-01-28 RX ORDER — OMEPRAZOLE 40 MG/1
CAPSULE, DELAYED RELEASE ORAL
Qty: 90 | Refills: 4 | Status: ACTIVE

## 2020-01-28 RX ORDER — METOCLOPRAMIDE HYDROCHLORIDE 10 MG/1
40 TABLET ORAL
Qty: 360 | Refills: 5 | Status: COMPLETED
End: 2020-01-28

## 2020-01-28 RX ORDER — ONDANSETRON 4 MG/1
12 TABLET, ORALLY DISINTEGRATING ORAL
Qty: 90 | Refills: 4 | Status: COMPLETED
Start: 2019-11-26 | End: 2020-10-12

## 2020-01-28 RX ORDER — PRUCALOPRIDE 2 MG/1
4 TABLET, FILM COATED ORAL
Qty: 60 | Refills: 11 | Status: COMPLETED
Start: 2019-11-26 | End: 2022-05-26

## 2020-02-12 ENCOUNTER — LAB (OUTPATIENT)
Dept: LAB | Facility: HOSPITAL | Age: 60
End: 2020-02-12

## 2020-02-12 DIAGNOSIS — M81.0 OSTEOPOROSIS, UNSPECIFIED OSTEOPOROSIS TYPE, UNSPECIFIED PATHOLOGICAL FRACTURE PRESENCE: ICD-10-CM

## 2020-02-12 DIAGNOSIS — E55.9 VITAMIN D DEFICIENCY: ICD-10-CM

## 2020-02-12 LAB
25(OH)D3 SERPL-MCNC: 28.8 NG/ML (ref 30–100)
CALCIUM SPEC-SCNC: 9.3 MG/DL (ref 8.6–10.5)

## 2020-02-12 PROCEDURE — 82306 VITAMIN D 25 HYDROXY: CPT

## 2020-02-12 PROCEDURE — 36415 COLL VENOUS BLD VENIPUNCTURE: CPT

## 2020-02-12 PROCEDURE — 82310 ASSAY OF CALCIUM: CPT

## 2020-02-17 ENCOUNTER — OFFICE VISIT (OUTPATIENT)
Dept: RHEUMATOLOGY | Facility: CLINIC | Age: 60
End: 2020-02-17

## 2020-02-17 VITALS
WEIGHT: 153 LBS | HEIGHT: 61 IN | HEART RATE: 68 BPM | BODY MASS INDEX: 28.89 KG/M2 | DIASTOLIC BLOOD PRESSURE: 64 MMHG | SYSTOLIC BLOOD PRESSURE: 130 MMHG

## 2020-02-17 DIAGNOSIS — E55.9 VITAMIN D DEFICIENCY: ICD-10-CM

## 2020-02-17 DIAGNOSIS — M06.4 INFLAMMATORY POLYARTHROPATHY (HCC): ICD-10-CM

## 2020-02-17 DIAGNOSIS — M19.90 INFLAMMATORY ARTHRITIS: Primary | ICD-10-CM

## 2020-02-17 DIAGNOSIS — M81.0 OSTEOPOROSIS, UNSPECIFIED OSTEOPOROSIS TYPE, UNSPECIFIED PATHOLOGICAL FRACTURE PRESENCE: ICD-10-CM

## 2020-02-17 PROCEDURE — 99213 OFFICE O/P EST LOW 20 MIN: CPT | Performed by: NURSE PRACTITIONER

## 2020-02-17 PROCEDURE — 96372 THER/PROPH/DIAG INJ SC/IM: CPT | Performed by: NURSE PRACTITIONER

## 2020-02-17 RX ORDER — ONDANSETRON 4 MG/1
TABLET, ORALLY DISINTEGRATING ORAL
COMMUNITY
Start: 2020-01-28

## 2020-02-17 RX ORDER — ATORVASTATIN CALCIUM 40 MG/1
TABLET, FILM COATED ORAL
COMMUNITY
Start: 2020-02-03

## 2020-02-17 RX ORDER — ERGOCALCIFEROL (VITAMIN D2) 10 MCG
400 TABLET ORAL 4 TIMES DAILY
COMMUNITY

## 2020-02-17 RX ORDER — PAROXETINE 30 MG/1
TABLET, FILM COATED ORAL
COMMUNITY
Start: 2020-01-24

## 2020-02-17 RX ORDER — WARFARIN SODIUM 2.5 MG/1
TABLET ORAL
COMMUNITY
Start: 2020-02-12

## 2020-02-17 RX ORDER — LEVOTHYROXINE SODIUM 0.05 MG/1
TABLET ORAL
COMMUNITY
Start: 2020-02-07

## 2020-02-17 RX ORDER — TIOTROPIUM BROMIDE INHALATION SPRAY 3.12 UG/1
SPRAY, METERED RESPIRATORY (INHALATION)
COMMUNITY
Start: 2020-02-07

## 2020-02-17 RX ORDER — PRUCALOPRIDE 2 MG/1
TABLET, FILM COATED ORAL
COMMUNITY
Start: 2020-02-13

## 2020-02-17 NOTE — PROGRESS NOTES
Patient is here to get Prolia injection. Patient labs were checked by Diane GIL, calcium within normal limits, patient signed consent form. Prolia was injected into L arm SQ, monitored her for 20 minutes after injection, no reaction to injection was noted, no tachycardia, chest pain, no difficulty breathing, no SOA. Cardiopulmonary was normal, vitals were checked.

## 2020-02-17 NOTE — PROGRESS NOTES
Subjective   Nighat Montoya is a 59 y.o. female.     History of Present Illness     Pt is a pleasant 59 y.o. female pt who is here for follow up today for her inflammatory arthritis, +DENIS and osteoporosis. Last seen in the office in August 2019.  She had recent labs on 2-. Labs reviewed.     She stopped taking azulfidine on her own, she reports feeling better and decided to stop taking this.  Doing well. Denies any joint stiffness, pain or swelling.  She is taking prolia for her bone loss & tolerates this well. She is taking daily calcium and vitamin D. She takes vitamin D 400 IU once daily. Denies any recent falls or fractures. No planned dental work or infection. +Fam history of osteoporosis. She had a dexa scan 1-4-18 that showed osteoporosis, L1-L4 t-score was -3.3 with high risk of fracture.      ROS: Denies fever or chills. No nasal or oral lesions. Denies dry eyes, no skin rashes or OS. She has borderline diabetes and tries to watch her diet. No CP. Intermittent SOA, +COPD--sees pulmonologist Q3 months (Dr. Monet) Wears CPAP for her sleep apnea. Current everyday smoker, smokers 1/2 PPD.  She Denies N/V/D. She does have gastroparesis. Sees Dr. Fernandez. Denies history of UC or crohns. Low energy.  The remainder of the review of systems reviewed and are (-).  Hx of CVA in December 2017 uses wheechair.       Rheumatological history:  Inflammatory Arthritis: Yao started Azulfidine 11/2016---> pt self dc'd Jan 2020    Osteoporosis:  Fosamax in the past  Started Prolia 8/2018       Nighat Montoya reports a pain score of 1.  Given her pain assessment as noted, treatment options were discussed and the following options were decided upon as a follow-up plan to address the patient's pain: continuation of current treatment plan for pain.           The following portions of the patient's history were reviewed and updated as appropriate: allergies, current medications, past family history, past medical history,  past social history, past surgical history and problem list.    Review of Systems  See HPI    Objective   Physical Exam   Constitutional: She is oriented to person, place, and time. She appears well-developed and well-nourished.   HENT:   Head: Normocephalic.   Nose: Nose normal.   Eyes: Pupils are equal, round, and reactive to light. Conjunctivae and EOM are normal.   Cardiovascular: Normal rate and regular rhythm.   Pulmonary/Chest: Effort normal and breath sounds normal. She has no wheezes.   Musculoskeletal:   She is in wheelchair;mild decreased ROM over the ricky shoulders, knees  Non tender on examination  No swelling or synovitis is noted on examination   Neurological: She is alert and oriented to person, place, and time.   Skin: Skin is warm and dry.   Injection site: left arm--no reaction is noted.   Psychiatric: She has a normal mood and affect.   Vitals reviewed.        Assessment/Plan   Nighat was seen today for joint pain and osteoporosis.    Diagnoses and all orders for this visit:    Inflammatory arthritis  Comments:  No clinical features of active disease  Pt seld dc'd her SSZ due to not having symptoms--is doing well at this time  Discussed watchful waiting.  Orders:  -     CBC & Differential; Future  -     Comprehensive Metabolic Panel; Future  -     C-reactive Protein; Future    Vitamin D deficiency  Comments:  Take vitamin D 2000 IU once daily    Osteoporosis, unspecified osteoporosis type, unspecified pathological fracture presence  Comments:  Prolia given--pt tolerated well.  Due for dexa scan, order given. Continue calcium and vitamin D. Joint protection exercises.  Orders:  -     DEXA Bone Density Axial; Future  -     Vitamin D 25 Hydroxy; Future  -     denosumab (PROLIA) syringe 60 mg    Inflammatory polyarthropathy (CMS/HCC)   -     CBC & Differential; Future        Patient Instructions   No clinical features of active disease  Pt seld dc'd her SSZ due to not having joint symptoms-is doing  well at this time  Discussed watchful waiting and she is aware to call if any changes.  Prolia today--pt tolerated well. Future lab orders given.  Dexa scan--order given

## 2020-02-17 NOTE — PATIENT INSTRUCTIONS
No clinical features of active disease  Pt seld dc'd her SSZ due to not having joint symptoms-is doing well at this time  Discussed watchful waiting and she is aware to call if any changes.  Prolia today--pt tolerated well. Future lab orders given.  Dexa scan--order given

## 2020-04-03 ENCOUNTER — TELEPHONE (OUTPATIENT)
Dept: RHEUMATOLOGY | Facility: CLINIC | Age: 60
End: 2020-04-03

## 2020-04-13 RX ORDER — SULFASALAZINE 500 MG/1
TABLET ORAL
Qty: 180 TABLET | Refills: 0 | Status: SHIPPED | OUTPATIENT
Start: 2020-04-13

## 2020-04-28 PROBLEM — K22.2: Status: ACTIVE | Noted: 2019-05-22

## 2020-08-07 ENCOUNTER — OFFICE (AMBULATORY)
Dept: URBAN - METROPOLITAN AREA CLINIC 64 | Facility: CLINIC | Age: 60
End: 2020-08-07

## 2020-08-07 VITALS
HEART RATE: 66 BPM | HEIGHT: 61 IN | DIASTOLIC BLOOD PRESSURE: 959 MMHG | SYSTOLIC BLOOD PRESSURE: 114 MMHG | WEIGHT: 163 LBS

## 2020-08-07 DIAGNOSIS — K31.84 GASTROPARESIS: ICD-10-CM

## 2020-08-07 DIAGNOSIS — K59.09 OTHER CONSTIPATION: ICD-10-CM

## 2020-08-07 DIAGNOSIS — R10.12 LEFT UPPER QUADRANT PAIN: ICD-10-CM

## 2020-08-07 DIAGNOSIS — R14.0 ABDOMINAL DISTENSION (GASEOUS): ICD-10-CM

## 2020-08-07 PROCEDURE — 99213 OFFICE O/P EST LOW 20 MIN: CPT | Performed by: INTERNAL MEDICINE

## 2020-08-07 RX ORDER — OMEPRAZOLE 40 MG/1
CAPSULE, DELAYED RELEASE ORAL
Qty: 90 | Refills: 4 | Status: ACTIVE

## 2020-08-07 RX ORDER — METOCLOPRAMIDE HYDROCHLORIDE 10 MG/1
TABLET ORAL
Qty: 90 | Refills: 12 | Status: COMPLETED
Start: 2020-08-07 | End: 2020-09-24

## 2020-08-07 RX ORDER — PRUCALOPRIDE 2 MG/1
4 TABLET, FILM COATED ORAL
Qty: 60 | Refills: 11 | Status: COMPLETED
Start: 2019-11-26 | End: 2022-05-26

## 2020-08-07 RX ORDER — ONDANSETRON 4 MG/1
12 TABLET, ORALLY DISINTEGRATING ORAL
Qty: 90 | Refills: 4 | Status: COMPLETED
Start: 2019-11-26 | End: 2020-10-12

## 2020-09-24 ENCOUNTER — OFFICE (AMBULATORY)
Dept: URBAN - METROPOLITAN AREA CLINIC 64 | Facility: CLINIC | Age: 60
End: 2020-09-24

## 2020-09-24 VITALS
WEIGHT: 168 LBS | HEIGHT: 61 IN | DIASTOLIC BLOOD PRESSURE: 50 MMHG | HEART RATE: 69 BPM | SYSTOLIC BLOOD PRESSURE: 100 MMHG

## 2020-09-24 DIAGNOSIS — K59.09 OTHER CONSTIPATION: ICD-10-CM

## 2020-09-24 DIAGNOSIS — R14.0 ABDOMINAL DISTENSION (GASEOUS): ICD-10-CM

## 2020-09-24 DIAGNOSIS — K44.9 DIAPHRAGMATIC HERNIA WITHOUT OBSTRUCTION OR GANGRENE: ICD-10-CM

## 2020-09-24 DIAGNOSIS — R10.12 LEFT UPPER QUADRANT PAIN: ICD-10-CM

## 2020-09-24 PROCEDURE — 99213 OFFICE O/P EST LOW 20 MIN: CPT | Performed by: INTERNAL MEDICINE

## 2020-10-13 ENCOUNTER — ON CAMPUS - OUTPATIENT (AMBULATORY)
Dept: URBAN - METROPOLITAN AREA HOSPITAL 2 | Facility: HOSPITAL | Age: 60
End: 2020-10-13

## 2020-10-13 VITALS
TEMPERATURE: 96.6 F | DIASTOLIC BLOOD PRESSURE: 65 MMHG | RESPIRATION RATE: 19 BRPM | SYSTOLIC BLOOD PRESSURE: 110 MMHG | OXYGEN SATURATION: 96 % | DIASTOLIC BLOOD PRESSURE: 73 MMHG | SYSTOLIC BLOOD PRESSURE: 135 MMHG | HEART RATE: 95 BPM | WEIGHT: 164 LBS | OXYGEN SATURATION: 98 % | DIASTOLIC BLOOD PRESSURE: 68 MMHG | DIASTOLIC BLOOD PRESSURE: 80 MMHG | OXYGEN SATURATION: 99 % | RESPIRATION RATE: 16 BRPM | SYSTOLIC BLOOD PRESSURE: 98 MMHG | HEART RATE: 106 BPM | RESPIRATION RATE: 18 BRPM | DIASTOLIC BLOOD PRESSURE: 69 MMHG | OXYGEN SATURATION: 94 % | SYSTOLIC BLOOD PRESSURE: 106 MMHG | HEART RATE: 94 BPM | HEART RATE: 93 BPM | HEIGHT: 61 IN | HEART RATE: 88 BPM | SYSTOLIC BLOOD PRESSURE: 95 MMHG

## 2020-10-13 DIAGNOSIS — K44.9 DIAPHRAGMATIC HERNIA WITHOUT OBSTRUCTION OR GANGRENE: ICD-10-CM

## 2020-10-13 DIAGNOSIS — K22.2 ESOPHAGEAL OBSTRUCTION: ICD-10-CM

## 2020-10-13 DIAGNOSIS — K31.84 GASTROPARESIS: ICD-10-CM

## 2020-10-13 PROCEDURE — 43235 EGD DIAGNOSTIC BRUSH WASH: CPT | Performed by: INTERNAL MEDICINE

## 2020-10-13 PROCEDURE — 43450 DILATE ESOPHAGUS 1/MULT PASS: CPT | Performed by: INTERNAL MEDICINE

## 2020-10-13 RX ORDER — PRUCALOPRIDE 2 MG/1
4 TABLET, FILM COATED ORAL
Qty: 60 | Refills: 11 | Status: COMPLETED
Start: 2019-11-26 | End: 2022-05-26

## 2021-07-30 PROBLEM — R13.19 ESOPHAGEAL DYSPHAGIA: Status: ACTIVE | Noted: 2019-05-22

## 2021-07-30 PROBLEM — K22.2 ESOPHAGEAL OBSTRUCTION: Status: ACTIVE | Noted: 2019-05-22

## 2021-09-07 ENCOUNTER — OFFICE VISIT (OUTPATIENT)
Dept: ENDOCRINOLOGY | Facility: CLINIC | Age: 61
End: 2021-09-07

## 2021-09-07 DIAGNOSIS — E11.65 UNCONTROLLED TYPE 2 DIABETES MELLITUS WITH HYPERGLYCEMIA (HCC): ICD-10-CM

## 2021-09-07 DIAGNOSIS — E11.65 UNCONTROLLED TYPE 2 DIABETES MELLITUS WITH HYPERGLYCEMIA (HCC): Primary | ICD-10-CM

## 2021-09-07 PROCEDURE — G0108 DIAB MANAGE TRN  PER INDIV: HCPCS | Performed by: DIETITIAN, REGISTERED

## 2021-11-30 ENCOUNTER — ON CAMPUS - OUTPATIENT (AMBULATORY)
Dept: URBAN - METROPOLITAN AREA HOSPITAL 2 | Facility: HOSPITAL | Age: 61
End: 2021-11-30

## 2021-11-30 VITALS
RESPIRATION RATE: 17 BRPM | SYSTOLIC BLOOD PRESSURE: 143 MMHG | TEMPERATURE: 96.8 F | HEART RATE: 69 BPM | SYSTOLIC BLOOD PRESSURE: 124 MMHG | DIASTOLIC BLOOD PRESSURE: 70 MMHG | SYSTOLIC BLOOD PRESSURE: 130 MMHG | RESPIRATION RATE: 18 BRPM | OXYGEN SATURATION: 97 % | WEIGHT: 170 LBS | OXYGEN SATURATION: 95 % | DIASTOLIC BLOOD PRESSURE: 61 MMHG | RESPIRATION RATE: 16 BRPM | SYSTOLIC BLOOD PRESSURE: 112 MMHG | OXYGEN SATURATION: 94 % | DIASTOLIC BLOOD PRESSURE: 62 MMHG | HEIGHT: 61 IN | HEART RATE: 74 BPM | OXYGEN SATURATION: 100 % | DIASTOLIC BLOOD PRESSURE: 71 MMHG

## 2021-11-30 DIAGNOSIS — Z79.01 LONG TERM (CURRENT) USE OF ANTICOAGULANTS: ICD-10-CM

## 2021-11-30 DIAGNOSIS — K22.2 ESOPHAGEAL OBSTRUCTION: ICD-10-CM

## 2021-11-30 DIAGNOSIS — K44.9 DIAPHRAGMATIC HERNIA WITHOUT OBSTRUCTION OR GANGRENE: ICD-10-CM

## 2021-11-30 DIAGNOSIS — K31.84 GASTROPARESIS: ICD-10-CM

## 2021-11-30 DIAGNOSIS — R13.14 DYSPHAGIA, PHARYNGOESOPHAGEAL PHASE: ICD-10-CM

## 2021-11-30 PROCEDURE — 43235 EGD DIAGNOSTIC BRUSH WASH: CPT | Performed by: INTERNAL MEDICINE

## 2021-11-30 PROCEDURE — 43450 DILATE ESOPHAGUS 1/MULT PASS: CPT | Performed by: INTERNAL MEDICINE

## 2021-11-30 RX ORDER — SULFASALAZINE 500 MG/1
TABLET ORAL
Qty: 90 | Refills: 3 | Status: ACTIVE

## 2021-11-30 RX ORDER — OMEPRAZOLE 40 MG/1
CAPSULE, DELAYED RELEASE ORAL
Qty: 90 | Refills: 4 | Status: ACTIVE

## 2021-11-30 RX ORDER — PRUCALOPRIDE 2 MG/1
4 TABLET, FILM COATED ORAL
Qty: 60 | Refills: 11 | Status: COMPLETED
Start: 2019-11-26 | End: 2022-05-26

## 2021-11-30 RX ORDER — DICYCLOMINE HYDROCHLORIDE 20 MG/1
TABLET ORAL
Qty: 0 | Refills: 0 | Status: COMPLETED
End: 2022-05-26

## 2022-02-17 RX ORDER — MECLIZINE HCL 12.5 MG/1
12.5 TABLET ORAL DAILY
COMMUNITY

## 2022-02-17 RX ORDER — FERROUS SULFATE 325(65) MG
TABLET ORAL
COMMUNITY

## 2022-02-17 RX ORDER — METHOCARBAMOL 500 MG/1
TABLET, FILM COATED ORAL
COMMUNITY
Start: 2021-05-26

## 2022-05-26 ENCOUNTER — OFFICE (AMBULATORY)
Dept: URBAN - METROPOLITAN AREA CLINIC 64 | Facility: CLINIC | Age: 62
End: 2022-05-26

## 2022-05-26 VITALS
SYSTOLIC BLOOD PRESSURE: 110 MMHG | HEIGHT: 61 IN | DIASTOLIC BLOOD PRESSURE: 59 MMHG | WEIGHT: 163 LBS | HEART RATE: 72 BPM

## 2022-05-26 DIAGNOSIS — K21.9 GASTRO-ESOPHAGEAL REFLUX DISEASE WITHOUT ESOPHAGITIS: ICD-10-CM

## 2022-05-26 PROCEDURE — 99214 OFFICE O/P EST MOD 30 MIN: CPT | Performed by: INTERNAL MEDICINE

## 2022-08-25 ENCOUNTER — OFFICE (AMBULATORY)
Dept: URBAN - METROPOLITAN AREA CLINIC 64 | Facility: CLINIC | Age: 62
End: 2022-08-25

## 2022-08-25 VITALS
DIASTOLIC BLOOD PRESSURE: 58 MMHG | WEIGHT: 161 LBS | SYSTOLIC BLOOD PRESSURE: 106 MMHG | HEART RATE: 75 BPM | HEIGHT: 61 IN

## 2022-08-25 DIAGNOSIS — K21.9 GASTRO-ESOPHAGEAL REFLUX DISEASE WITHOUT ESOPHAGITIS: ICD-10-CM

## 2022-08-25 PROCEDURE — 99213 OFFICE O/P EST LOW 20 MIN: CPT | Performed by: NURSE PRACTITIONER

## 2022-10-02 ENCOUNTER — HOSPITAL ENCOUNTER (OUTPATIENT)
Facility: HOSPITAL | Age: 62
Discharge: HOME | End: 2022-10-02
Attending: EMERGENCY MEDICINE | Admitting: EMERGENCY MEDICINE

## 2022-10-02 VITALS
TEMPERATURE: 98 F | SYSTOLIC BLOOD PRESSURE: 114 MMHG | BODY MASS INDEX: 31.72 KG/M2 | HEART RATE: 84 BPM | HEIGHT: 61 IN | DIASTOLIC BLOOD PRESSURE: 56 MMHG | OXYGEN SATURATION: 96 % | RESPIRATION RATE: 16 BRPM | WEIGHT: 168 LBS

## 2022-10-02 DIAGNOSIS — S29.019A THORACIC MYOFASCIAL STRAIN, INITIAL ENCOUNTER: Primary | ICD-10-CM

## 2022-10-02 PROCEDURE — 99203 OFFICE O/P NEW LOW 30 MIN: CPT | Performed by: EMERGENCY MEDICINE

## 2022-10-02 PROCEDURE — G0463 HOSPITAL OUTPT CLINIC VISIT: HCPCS | Performed by: EMERGENCY MEDICINE

## 2022-10-02 RX ORDER — LIDOCAINE 50 MG/G
1 PATCH TOPICAL EVERY 24 HOURS
Qty: 7 PATCH | Refills: 0 | Status: SHIPPED | OUTPATIENT
Start: 2022-10-02 | End: 2022-10-09

## 2022-10-02 NOTE — DISCHARGE INSTRUCTIONS
Please continue taking your muscle relaxer and Norco.  Slow stretching exercises as discussed.  Apply Lidoderm patch to tender area and then remove 12 hours later.  Leave off for 12 hours and then repeat process.  Do this once a day for 7 days.  Return or seek immediate medical attention if having worsening symptoms, chest pain or shortness of breath or fevers or any concerns.

## 2022-10-02 NOTE — ED PROVIDER NOTES
Subjective   History of Present Illness  Patient is a pleasant 62-year-old woman with history of COPD who presents complaining of mild to moderate mid back pain described as a spasm.  Pain is worse with movement and twisting of torso and movement of arms.  She denies any numbness or weakness or chest pain or shortness of breath or pain in the arms or abdominal pain or lower back pain or dysuria or neck pain.  Patient does not recall an injury but states that she does do activities where she is leaning over for an extended period of time and feels this may have caused her back pain.        Review of Systems   Constitutional: Negative for chills and fever.   HENT: Negative for congestion and sore throat.    Respiratory: Negative for cough, chest tightness and shortness of breath.    Cardiovascular: Negative for chest pain.   Gastrointestinal: Negative for abdominal pain, diarrhea, nausea and vomiting.   Genitourinary: Negative for difficulty urinating and dysuria.   Musculoskeletal: Positive for back pain. Negative for myalgias and neck pain.   Neurological: Negative for weakness, numbness and headaches.       Past Medical History:   Diagnosis Date   • Arthritis    • Cardiomyopathy (CMS/HCC)    • CHF (congestive heart failure) (CMS/HCC)    • COPD (chronic obstructive pulmonary disease) (CMS/HCC)    • Endometriosis    • Hypertension    • Mitral valve stenosis    • Rheumatic fever    • Thyroid disorder 2019       Allergies   Allergen Reactions   • Metronidazole Rash   • Budesonide Rash   • Clindamycin Rash       Past Surgical History:   Procedure Laterality Date   • ABDOMINAL SURGERY     • CARDIAC CATHETERIZATION Left 2017    Procedure: Cardiac Catheterization/Vascular Study;  Surgeon: Pino Umaña MD;  Location: [facility] INVASIVE LOCATION;  Service:    • CARDIAC SURGERY     •  SECTION     • CHOLECYSTECTOMY     • D&C HYSTEROSCOPY WITH NOVASURE ENDOMETRIAL ABLATION AND MYOSURE     • MITRAL VALVE  REPLACEMENT     • OTHER SURGICAL HISTORY      CARDIAC CATH PROCEDURE OUTCOME: SUCCESSFUL   • TONSILLECTOMY     • TUBAL ABDOMINAL LIGATION     • VASCULAR SURGERY         Family History   Problem Relation Age of Onset   • Lung cancer Father    • Lung cancer Brother        Social History     Socioeconomic History   • Marital status:    Tobacco Use   • Smoking status: Current Every Day Smoker     Packs/day: 0.50     Years: 15.00     Pack years: 7.50     Types: Cigarettes     Last attempt to quit: 2015     Years since quittin.6   • Smokeless tobacco: Never Used   Substance and Sexual Activity   • Alcohol use: No   • Drug use: No   • Sexual activity: Never           Objective   Physical Exam  Vitals and nursing note reviewed.   Constitutional:       General: She is not in acute distress.     Appearance: Normal appearance. She is not ill-appearing.      Comments: Patient is resting comfortably on wheelchair.  She speaking full sentences without difficulty.   HENT:      Head: Normocephalic and atraumatic.   Eyes:      Extraocular Movements: Extraocular movements intact.   Cardiovascular:      Rate and Rhythm: Normal rate and regular rhythm.      Pulses: Normal pulses.      Heart sounds: Normal heart sounds.      Comments: 2+ equal and symmetrical bilateral radial pulses.  Pulmonary:      Effort: Pulmonary effort is normal.      Breath sounds: Normal breath sounds.   Abdominal:      Palpations: Abdomen is soft.      Tenderness: There is no abdominal tenderness.   Musculoskeletal:         General: Normal range of motion.      Cervical back: Normal range of motion and neck supple. No tenderness.      Comments: Midthoracic paraspinal tenderness without midline vertebral tenderness.  Pain is reproducible with palpation and movement of arms and twisting of torso.  There is no lower back tenderness or cervical spine tenderness.   Skin:     General: Skin is warm and dry.      Capillary Refill: Capillary refill takes  less than 2 seconds.   Neurological:      General: No focal deficit present.      Mental Status: She is alert.         Procedures           ED Course                                           MDM   Patient with thoracic strain pain with paraspinal tenderness to the mid thorax.  Patient does not activity which she is leaning over for extended period of time may have exacerbated or caused her back pain.  She has no chest pain or shortness of breath or pain in the arms and she has 2+ equal symmetrical radial pulses.  She appears comfortable.  She is on Norco and a muscle relaxer.  I have advised patient to apply cold and warm compress and alternate and then to take Lidoderm patch which has been prescribed.  Patient will return if symptoms worsen or any new symptoms develop.    Final diagnoses:   Thoracic myofascial strain, initial encounter       ED Disposition  ED Disposition     ED Disposition   Discharge    Condition   Stable    Comment   --             Serenity Francois, APRN  [address] [facility] [place] [address]  [phone]    In 1 week      [place] [address] [address] [place] [id number] [phone]    If symptoms worsen         Medication List      New Prescriptions    lidocaine 5 %  Commonly known as: LIDODERM  Place 1 patch on the skin as directed by provider Daily for 7 days. Remove & Discard patch within 12 hours or as directed by MD           Where to Get Your Medications      These medications were sent to [facility]/pharmacy #[id number] - [place], [place] - [address] [place] [address] - [phone]  - [phone] FX  [address] [place] [address] [place] [address]    Hours: 24-hours Phone: [phone]   · lidocaine 5 %          Jose Alejandro Yun MD  10/02/22 9430

## 2022-11-14 ENCOUNTER — OFFICE (AMBULATORY)
Dept: URBAN - METROPOLITAN AREA CLINIC 64 | Facility: CLINIC | Age: 62
End: 2022-11-14

## 2022-11-14 VITALS
SYSTOLIC BLOOD PRESSURE: 117 MMHG | HEIGHT: 61 IN | DIASTOLIC BLOOD PRESSURE: 61 MMHG | WEIGHT: 160 LBS | HEART RATE: 77 BPM

## 2022-11-14 DIAGNOSIS — K59.09 OTHER CONSTIPATION: ICD-10-CM

## 2022-11-14 DIAGNOSIS — K30 FUNCTIONAL DYSPEPSIA: ICD-10-CM

## 2022-11-14 PROCEDURE — 99212 OFFICE O/P EST SF 10 MIN: CPT | Performed by: NURSE PRACTITIONER

## 2023-05-15 ENCOUNTER — OFFICE (AMBULATORY)
Dept: URBAN - METROPOLITAN AREA CLINIC 64 | Facility: CLINIC | Age: 63
End: 2023-05-15

## 2023-05-15 VITALS
DIASTOLIC BLOOD PRESSURE: 71 MMHG | HEIGHT: 61 IN | SYSTOLIC BLOOD PRESSURE: 130 MMHG | HEART RATE: 81 BPM | WEIGHT: 151 LBS

## 2023-05-15 DIAGNOSIS — K21.9 GASTRO-ESOPHAGEAL REFLUX DISEASE WITHOUT ESOPHAGITIS: ICD-10-CM

## 2023-05-15 DIAGNOSIS — Z95.810 PRESENCE OF AUTOMATIC (IMPLANTABLE) CARDIAC DEFIBRILLATOR: ICD-10-CM

## 2023-05-15 DIAGNOSIS — F11.90 OPIOID USE, UNSPECIFIED, UNCOMPLICATED: ICD-10-CM

## 2023-05-15 DIAGNOSIS — J44.9 CHRONIC OBSTRUCTIVE PULMONARY DISEASE, UNSPECIFIED: ICD-10-CM

## 2023-05-15 DIAGNOSIS — Z79.01 LONG TERM (CURRENT) USE OF ANTICOAGULANTS: ICD-10-CM

## 2023-05-15 DIAGNOSIS — I10 ESSENTIAL (PRIMARY) HYPERTENSION: ICD-10-CM

## 2023-05-15 DIAGNOSIS — K59.09 OTHER CONSTIPATION: ICD-10-CM

## 2023-05-15 DIAGNOSIS — R63.4 ABNORMAL WEIGHT LOSS: ICD-10-CM

## 2023-05-15 DIAGNOSIS — I48.91 UNSPECIFIED ATRIAL FIBRILLATION: ICD-10-CM

## 2023-05-15 DIAGNOSIS — F17.200 NICOTINE DEPENDENCE, UNSPECIFIED, UNCOMPLICATED: ICD-10-CM

## 2023-05-15 DIAGNOSIS — R11.0 NAUSEA: ICD-10-CM

## 2023-05-15 DIAGNOSIS — E78.00 PURE HYPERCHOLESTEROLEMIA, UNSPECIFIED: ICD-10-CM

## 2023-05-15 PROCEDURE — 99212 OFFICE O/P EST SF 10 MIN: CPT | Performed by: NURSE PRACTITIONER

## 2023-05-15 RX ORDER — METHYLNALTREXONE BROMIDE 150 MG/1
450 TABLET ORAL
Qty: 270 | Refills: 3 | Status: ACTIVE

## 2023-11-12 ENCOUNTER — APPOINTMENT (OUTPATIENT)
Dept: GENERAL RADIOLOGY | Facility: HOSPITAL | Age: 63
End: 2023-11-12
Payer: MEDICARE

## 2023-11-12 ENCOUNTER — HOSPITAL ENCOUNTER (EMERGENCY)
Facility: HOSPITAL | Age: 63
Discharge: HOME | End: 2023-11-12
Attending: EMERGENCY MEDICINE | Admitting: EMERGENCY MEDICINE
Payer: MEDICARE

## 2023-11-12 ENCOUNTER — APPOINTMENT (OUTPATIENT)
Dept: CT IMAGING | Facility: HOSPITAL | Age: 63
End: 2023-11-12
Payer: MEDICARE

## 2023-11-12 VITALS
SYSTOLIC BLOOD PRESSURE: 128 MMHG | BODY MASS INDEX: 27.56 KG/M2 | WEIGHT: 146 LBS | DIASTOLIC BLOOD PRESSURE: 64 MMHG | RESPIRATION RATE: 12 BRPM | TEMPERATURE: 97.8 F | HEART RATE: 103 BPM | OXYGEN SATURATION: 95 % | HEIGHT: 61 IN

## 2023-11-12 DIAGNOSIS — J40 BRONCHITIS: Primary | ICD-10-CM

## 2023-11-12 DIAGNOSIS — R06.02 SHORTNESS OF BREATH: ICD-10-CM

## 2023-11-12 LAB
ALBUMIN SERPL-MCNC: 4.3 G/DL (ref 3.5–5.2)
ALBUMIN/GLOB SERPL: 1.2 G/DL
ALP SERPL-CCNC: 93 U/L (ref 39–117)
ALT SERPL W P-5'-P-CCNC: 21 U/L (ref 1–33)
ANION GAP SERPL CALCULATED.3IONS-SCNC: 8 MMOL/L (ref 5–15)
AST SERPL-CCNC: 25 U/L (ref 1–32)
BASOPHILS # BLD AUTO: 0.2 10*3/MM3 (ref 0–0.2)
BASOPHILS NFR BLD AUTO: 0.9 % (ref 0–1.5)
BILIRUB SERPL-MCNC: 0.4 MG/DL (ref 0–1.2)
BUN SERPL-MCNC: 13 MG/DL (ref 8–23)
BUN/CREAT SERPL: 20 (ref 7–25)
CALCIUM SPEC-SCNC: 10.2 MG/DL (ref 8.6–10.5)
CHLORIDE SERPL-SCNC: 99 MMOL/L (ref 98–107)
CO2 SERPL-SCNC: 32 MMOL/L (ref 22–29)
CREAT SERPL-MCNC: 0.65 MG/DL (ref 0.57–1)
D-LACTATE SERPL-SCNC: 0.9 MMOL/L (ref 0.3–2)
DEPRECATED RDW RBC AUTO: 52.5 FL (ref 37–54)
EGFRCR SERPLBLD CKD-EPI 2021: 100.3 ML/MIN/1.73
EOSINOPHIL # BLD AUTO: 0.3 10*3/MM3 (ref 0–0.4)
EOSINOPHIL NFR BLD AUTO: 1.7 % (ref 0.3–6.2)
ERYTHROCYTE [DISTWIDTH] IN BLOOD BY AUTOMATED COUNT: 15.9 % (ref 12.3–15.4)
FLUAV SUBTYP SPEC NAA+PROBE: NOT DETECTED
FLUBV RNA ISLT QL NAA+PROBE: NOT DETECTED
GLOBULIN UR ELPH-MCNC: 3.6 GM/DL
GLUCOSE SERPL-MCNC: 146 MG/DL (ref 65–99)
HCT VFR BLD AUTO: 49 % (ref 34–46.6)
HGB BLD-MCNC: 15.9 G/DL (ref 12–15.9)
HOLD SPECIMEN: NORMAL
HOLD SPECIMEN: NORMAL
INR PPP: 3.92 (ref 2–3)
LYMPHOCYTES # BLD AUTO: 4.2 10*3/MM3 (ref 0.7–3.1)
LYMPHOCYTES NFR BLD AUTO: 22.8 % (ref 19.6–45.3)
MCH RBC QN AUTO: 29 PG (ref 26.6–33)
MCHC RBC AUTO-ENTMCNC: 32.4 G/DL (ref 31.5–35.7)
MCV RBC AUTO: 89.5 FL (ref 79–97)
MONOCYTES # BLD AUTO: 1.3 10*3/MM3 (ref 0.1–0.9)
MONOCYTES NFR BLD AUTO: 7 % (ref 5–12)
NEUTROPHILS NFR BLD AUTO: 12.5 10*3/MM3 (ref 1.7–7)
NEUTROPHILS NFR BLD AUTO: 67.6 % (ref 42.7–76)
NRBC BLD AUTO-RTO: 0.1 /100 WBC (ref 0–0.2)
PLATELET # BLD AUTO: 420 10*3/MM3 (ref 140–450)
PMV BLD AUTO: 8.9 FL (ref 6–12)
POTASSIUM SERPL-SCNC: 4.2 MMOL/L (ref 3.5–5.2)
PROT SERPL-MCNC: 7.9 G/DL (ref 6–8.5)
PROTHROMBIN TIME: 38.6 SECONDS (ref 19.4–28.5)
RBC # BLD AUTO: 5.47 10*6/MM3 (ref 3.77–5.28)
SARS-COV-2 RNA RESP QL NAA+PROBE: NOT DETECTED
SODIUM SERPL-SCNC: 139 MMOL/L (ref 136–145)
WBC NRBC COR # BLD: 18.4 10*3/MM3 (ref 3.4–10.8)
WHOLE BLOOD HOLD COAG: NORMAL

## 2023-11-12 PROCEDURE — 80053 COMPREHEN METABOLIC PANEL: CPT | Performed by: EMERGENCY MEDICINE

## 2023-11-12 PROCEDURE — 99284 EMERGENCY DEPT VISIT MOD MDM: CPT

## 2023-11-12 PROCEDURE — 94640 AIRWAY INHALATION TREATMENT: CPT

## 2023-11-12 PROCEDURE — 94664 DEMO&/EVAL PT USE INHALER: CPT

## 2023-11-12 PROCEDURE — 36415 COLL VENOUS BLD VENIPUNCTURE: CPT

## 2023-11-12 PROCEDURE — 87636 SARSCOV2 & INF A&B AMP PRB: CPT

## 2023-11-12 PROCEDURE — 85025 COMPLETE CBC W/AUTO DIFF WBC: CPT | Performed by: EMERGENCY MEDICINE

## 2023-11-12 PROCEDURE — 83605 ASSAY OF LACTIC ACID: CPT

## 2023-11-12 PROCEDURE — 85610 PROTHROMBIN TIME: CPT

## 2023-11-12 PROCEDURE — 87040 BLOOD CULTURE FOR BACTERIA: CPT | Performed by: EMERGENCY MEDICINE

## 2023-11-12 PROCEDURE — 71250 CT THORAX DX C-: CPT

## 2023-11-12 PROCEDURE — 93005 ELECTROCARDIOGRAM TRACING: CPT | Performed by: EMERGENCY MEDICINE

## 2023-11-12 PROCEDURE — 94799 UNLISTED PULMONARY SVC/PX: CPT

## 2023-11-12 RX ORDER — IPRATROPIUM BROMIDE AND ALBUTEROL SULFATE 2.5; .5 MG/3ML; MG/3ML
3 SOLUTION RESPIRATORY (INHALATION) ONCE
Status: COMPLETED | OUTPATIENT
Start: 2023-11-12 | End: 2023-11-12

## 2023-11-12 RX ORDER — SODIUM CHLORIDE 0.9 % (FLUSH) 0.9 %
10 SYRINGE (ML) INJECTION AS NEEDED
Status: DISCONTINUED | OUTPATIENT
Start: 2023-11-12 | End: 2023-11-12 | Stop reason: HOSPADM

## 2023-11-12 RX ADMIN — IPRATROPIUM BROMIDE AND ALBUTEROL SULFATE 3 ML: .5; 3 SOLUTION RESPIRATORY (INHALATION) at 12:10

## 2023-11-12 NOTE — ED PROVIDER NOTES
"Subjective   History of Present Illness  Patient is a 63-year-old  female with a history of vertigo, CHF and COPD who presents to the emergency room with complaints of cough and congestion for the past month.  This morning, patient began dyspneic to the point that she was going to \"pass out.\"  She has recently been taking azithromycin and states that she has 1-2 more days left.  She has had no fever, diarrhea, abdominal pain, chest pain or dysuria.  She denies any recent exposure to illness.  She has allergies to metronidazole, budesonide and clindamycin.    PCP: Michael      Review of Systems   Constitutional:  Negative for appetite change and fever.   HENT:  Positive for congestion.    Respiratory:  Positive for cough and shortness of breath.    Cardiovascular:  Negative for chest pain.   Gastrointestinal:  Negative for abdominal pain and nausea.   Genitourinary:  Negative for dysuria and urgency.   Neurological:  Negative for syncope and headaches.   Psychiatric/Behavioral:  Negative for confusion.    All other systems reviewed and are negative.      Past Medical History:   Diagnosis Date    Arthritis     Cardiomyopathy     CHF (congestive heart failure)     COPD (chronic obstructive pulmonary disease)     Endometriosis     Hypertension     Mitral valve stenosis     Rheumatic fever     Thyroid disorder 2019       Allergies   Allergen Reactions    Metronidazole Rash    Budesonide Rash    Clindamycin Rash       Past Surgical History:   Procedure Laterality Date    ABDOMINAL SURGERY      CARDIAC CATHETERIZATION Left 2017    Procedure: Cardiac Catheterization/Vascular Study;  Surgeon: Pino Umaña MD;  Location: [facility] INVASIVE LOCATION;  Service:     CARDIAC SURGERY       SECTION      CHOLECYSTECTOMY      D&C HYSTEROSCOPY WITH NOVASURE ENDOMETRIAL ABLATION AND MYOSURE      MITRAL VALVE REPLACEMENT      OTHER SURGICAL HISTORY      CARDIAC CATH PROCEDURE OUTCOME: SUCCESSFUL    " "TONSILLECTOMY      TUBAL ABDOMINAL LIGATION      VASCULAR SURGERY         Family History   Problem Relation Age of Onset    Lung cancer Father     Lung cancer Brother        Social History     Socioeconomic History    Marital status:    Tobacco Use    Smoking status: Every Day     Packs/day: 0.50     Years: 15.00     Additional pack years: 0.00     Total pack years: 7.50     Types: Cigarettes     Last attempt to quit: 2015     Years since quittin.7    Smokeless tobacco: Never   Substance and Sexual Activity    Alcohol use: No    Drug use: No    Sexual activity: Never           Objective   Physical Exam  Vitals and nursing note reviewed.   Constitutional:       General: She is not in acute distress.     Appearance: She is well-developed. She is not ill-appearing.   HENT:      Head: Normocephalic and atraumatic.   Eyes:      Pupils: Pupils are equal, round, and reactive to light.   Cardiovascular:      Rate and Rhythm: Normal rate and regular rhythm.      Heart sounds: Murmur heard.   Pulmonary:      Effort: Pulmonary effort is normal.      Breath sounds: Examination of the right-lower field reveals decreased breath sounds. Examination of the left-lower field reveals decreased breath sounds.   Chest:      Chest wall: No tenderness.   Musculoskeletal:      Right lower leg: No edema.      Left lower leg: No edema.   Neurological:      Mental Status: She is alert.         Procedures           ED Course      /64   Pulse 103   Temp 97.8 °F (36.6 °C)   Resp 12   Ht 154.9 cm (61\")   Wt 66.2 kg (146 lb)   SpO2 95%   BMI 27.59 kg/m²   Labs Reviewed   COMPREHENSIVE METABOLIC PANEL - Abnormal; Notable for the following components:       Result Value    Glucose 146 (*)     CO2 32.0 (*)     All other components within normal limits    Narrative:     GFR Normal >60  Chronic Kidney Disease <60  Kidney Failure <15     CBC WITH AUTO DIFFERENTIAL - Abnormal; Notable for the following components:    WBC " 18.40 (*)     RBC 5.47 (*)     Hematocrit 49.0 (*)     RDW 15.9 (*)     Neutrophils, Absolute 12.50 (*)     Lymphocytes, Absolute 4.20 (*)     Monocytes, Absolute 1.30 (*)     All other components within normal limits   PROTIME-INR - Abnormal; Notable for the following components:    Protime 38.6 (*)     INR 3.92 (*)     All other components within normal limits   COVID-19 AND FLU A/B, NP SWAB IN TRANSPORT MEDIA 1 HR TAT - Normal    Narrative:     Fact sheet for providers: https://www.fda.gov/media/366372/download    Fact sheet for patients: https://www.fda.gov/media/350406/download    Test performed by PCR.   POC LACTATE - Normal   BLOOD CULTURE   BLOOD CULTURE   RAINBOW DRAW    Narrative:     The following orders were created for panel order [place] Draw.  Procedure                               Abnormality         Status                     ---------                               -----------         ------                     Green Top (Gel)[[id number]]                                  Final result               Lavender Top[[id number]]                                                                Gold Top - SST[[id number]]                                   Final result               Light Blue Top[[id number]]                                   Final result                 Please view results for these tests on the individual orders.   POC LACTATE   CBC AND DIFFERENTIAL    Narrative:     The following orders were created for panel order CBC & Differential.  Procedure                               Abnormality         Status                     ---------                               -----------         ------                     CBC Auto Differential[[id number]]        Abnormal            Final result                 Please view results for these tests on the individual orders.   GREEN TOP   GOLD TOP - SST   LIGHT BLUE TOP     Medications   sodium chloride 0.9 % flush 10 mL (has no administration in time range)    ipratropium-albuterol (DUO-NEB) nebulizer solution 3 mL (3 mL Nebulization Given 11/12/23 1210)     CT Chest Without Contrast Diagnostic    Result Date: 11/12/2023  1.Left-sided bronchial wall thickening, concerning for bronchitis. Faint tree-in-bud nodularity within the left upper lobe and minimal nodularity within the left lower lobe, suggesting atypical infiltrate. 2.Additional findings as detailed above. Electronically Signed: Fidencio Bell MD  11/12/2023 10:38 AM EST  Workstation ID: [id number]                                        Medical Decision Making  Problems Addressed:  Bronchitis: complicated acute illness or injury  Shortness of breath: complicated acute illness or injury    Amount and/or Complexity of Data Reviewed  Labs: ordered. Decision-making details documented in ED Course.  Radiology: ordered. Decision-making details documented in ED Course.  ECG/medicine tests: ordered.     Details: My interpretation EKG reveals sinus tachycardia with a regular rate of 104.  PVCs noted.  There are some inverted T waves in multiple leads but these were evident on previous study completed 6/1/2018.  EKG was also reviewed by Dr. Barros, who is agreeable to my interpretation.    Risk  Prescription drug management.    Patient is a pleasant 63-year-old  female with history of CHF and COPD who presents to the emergency room with complaints of shortness of breath has been ongoing for the past month.  She has had cough and congestion.  Patient has normal S1/S2 with murmur noted.  No leg edema.  Lungs with decreased breath sounds with mild rhonchi in the upper lobes bilaterally.  No CVA tenderness.  No cervical lymphadenopathy.  Initial differentials include pneumonia, COVID-19, bronchitis, viral illness.  This is not a complete list.    IV was established labs were obtained.  Patient received the above examination.  She was given a breathing treatment.  White count was found to be elevated at 18.4 but this  may be reactive due to patient's current use of steroids.  Her lactic was not elevated.  No influenza or COVID detected.  CMP with elevated CO2 of 32 but otherwise unremarkable.  My interpretation of CT reveals no evidence of pneumothorax or nodules.  This is concurrent with radiologist, who notes left-sided bronchial wall thickening concerning for bronchitis.  There is faint tree-in-bud nodularity within the left upper lobe and minimal nodularity within the left lower lobe suggesting atypical infiltrates.  Upon reassessment, patient is anxious to go home and continues to deny chest pain and dizziness.  Results were discussed but I believe it is best to keep patient in the hospital for further evaluation and she was offered observation admission.  Patient has declined at this time and states that she will follow-up to her primary care provider after antibiotics are finished.  Patient will finish antibiotics previously prescribed and continue taking prednisone as directed.  She is not requiring supplemental oxygen at this time.  She will return if symptoms worsen.  Patient is agreeable to plan of care and has been in no acute distress.    I discussed the findings with patient who voices understanding of discharge instructions, signs and symptoms requiring return to the ED; discharged improved and stable condition with follow-up for reevaluation.    Patient is aware that discharge does not mean that nothing is wrong but it indicates no emergency is present and they must continue care with follow-up as given below or physician of their choice.    This document is intended for medical expert use only.  Reading of this document by patients and/or patient's family without participating medical staff guidance may result in misinterpretation and unintended morbidity.  Any interpretation of such data is the responsibility of the patient and/or family member responsible for the patient in concert with their primary or  specialist providers, not to be left for sources of online search as such as [employer], [employer] or similar queries.  Relying on these approaches to knowledge may result in misinterpretation, misguided goals of care and even death should patient or family members try recommendations outside of the realm of professional medical care in a supervised inpatient environment.    This medical document was created using Dragon dictation system. Some errors in speech recognition may occur.    Final diagnoses:   Shortness of breath   Bronchitis       ED Disposition  ED Disposition       ED Disposition   Discharge    Condition   Stable    Comment   --               Sravan Rodriguez MD  [address] [place] [address] [place] [id number]  [phone]               Medication List      No changes were made to your prescriptions during this visit.            Galilea Smith, APRN  11/12/23 0643

## 2023-11-12 NOTE — DISCHARGE INSTRUCTIONS
Continue taking previously prescribed antibiotics and steroids as directed.  Finish your entire course of antibiotics.  Rest.  Increase your fluid intake and avoid dehydrating compounds it is caffeine, coffee, tea and soda.    Follow-up with your primary care provider or pulmonologist as needed for further evaluation.    Return to the ER for new or worsening symptoms.

## 2023-11-13 LAB
QT INTERVAL: 372 MS
QTC INTERVAL: 492 MS

## 2023-11-17 LAB
BACTERIA SPEC AEROBE CULT: NORMAL
BACTERIA SPEC AEROBE CULT: NORMAL

## 2023-11-20 ENCOUNTER — OFFICE (AMBULATORY)
Dept: URBAN - METROPOLITAN AREA CLINIC 64 | Facility: CLINIC | Age: 63
End: 2023-11-20
Payer: MEDICAID

## 2023-11-20 VITALS
DIASTOLIC BLOOD PRESSURE: 47 MMHG | SYSTOLIC BLOOD PRESSURE: 90 MMHG | DIASTOLIC BLOOD PRESSURE: 53 MMHG | WEIGHT: 142 LBS | HEART RATE: 81 BPM | SYSTOLIC BLOOD PRESSURE: 97 MMHG | HEIGHT: 61 IN

## 2023-11-20 DIAGNOSIS — K59.09 OTHER CONSTIPATION: ICD-10-CM

## 2023-11-20 DIAGNOSIS — R10.13 EPIGASTRIC PAIN: ICD-10-CM

## 2023-11-20 PROCEDURE — 99213 OFFICE O/P EST LOW 20 MIN: CPT | Performed by: NURSE PRACTITIONER

## 2023-11-20 RX ORDER — LINACLOTIDE 72 UG/1
CAPSULE, GELATIN COATED ORAL
Qty: 90 | Refills: 3 | Status: ACTIVE
Start: 2022-09-02

## 2023-11-20 RX ORDER — DICYCLOMINE HYDROCHLORIDE 20 MG/1
TABLET ORAL
Qty: 180 | Refills: 3 | Status: ACTIVE

## 2023-11-20 RX ORDER — OMEPRAZOLE 40 MG/1
CAPSULE, DELAYED RELEASE ORAL
Qty: 90 | Refills: 4 | Status: ACTIVE

## 2023-11-20 RX ORDER — METHYLNALTREXONE BROMIDE 150 MG/1
450 TABLET ORAL
Qty: 270 | Refills: 3 | Status: ACTIVE

## 2024-01-04 ENCOUNTER — TRANSCRIBE ORDERS (OUTPATIENT)
Dept: ADMINISTRATIVE | Facility: HOSPITAL | Age: 64
End: 2024-01-04
Payer: MEDICARE

## 2024-01-04 DIAGNOSIS — J18.9 UNRESOLVED PNEUMONIA: Primary | ICD-10-CM

## 2024-01-26 ENCOUNTER — HOSPITAL ENCOUNTER (OUTPATIENT)
Dept: CT IMAGING | Facility: HOSPITAL | Age: 64
Discharge: HOME | End: 2024-01-26
Admitting: NURSE PRACTITIONER
Payer: MEDICARE

## 2024-01-26 DIAGNOSIS — J18.9 UNRESOLVED PNEUMONIA: ICD-10-CM

## 2024-01-26 PROCEDURE — 71250 CT THORAX DX C-: CPT

## 2024-11-20 ENCOUNTER — OFFICE (AMBULATORY)
Age: 64
End: 2024-11-20
Payer: MEDICAID

## 2024-11-20 ENCOUNTER — OFFICE (AMBULATORY)
Dept: URBAN - METROPOLITAN AREA CLINIC 64 | Facility: CLINIC | Age: 64
End: 2024-11-20
Payer: MEDICAID

## 2024-11-20 VITALS
DIASTOLIC BLOOD PRESSURE: 55 MMHG | SYSTOLIC BLOOD PRESSURE: 95 MMHG | SYSTOLIC BLOOD PRESSURE: 95 MMHG | SYSTOLIC BLOOD PRESSURE: 95 MMHG | SYSTOLIC BLOOD PRESSURE: 95 MMHG | HEIGHT: 61 IN | DIASTOLIC BLOOD PRESSURE: 55 MMHG | DIASTOLIC BLOOD PRESSURE: 55 MMHG | HEART RATE: 71 BPM | WEIGHT: 140 LBS | SYSTOLIC BLOOD PRESSURE: 95 MMHG | DIASTOLIC BLOOD PRESSURE: 55 MMHG | HEART RATE: 71 BPM | HEIGHT: 61 IN | HEART RATE: 71 BPM | HEART RATE: 71 BPM | HEART RATE: 71 BPM | HEART RATE: 71 BPM | SYSTOLIC BLOOD PRESSURE: 95 MMHG | WEIGHT: 140 LBS | HEART RATE: 71 BPM | WEIGHT: 140 LBS | WEIGHT: 140 LBS | DIASTOLIC BLOOD PRESSURE: 55 MMHG | HEIGHT: 61 IN | SYSTOLIC BLOOD PRESSURE: 95 MMHG | WEIGHT: 140 LBS | HEIGHT: 61 IN | DIASTOLIC BLOOD PRESSURE: 55 MMHG | HEIGHT: 61 IN | WEIGHT: 140 LBS | WEIGHT: 140 LBS | HEIGHT: 61 IN | DIASTOLIC BLOOD PRESSURE: 55 MMHG | HEIGHT: 61 IN

## 2024-11-20 DIAGNOSIS — K30 FUNCTIONAL DYSPEPSIA: ICD-10-CM

## 2024-11-20 DIAGNOSIS — K59.09 OTHER CONSTIPATION: ICD-10-CM

## 2024-11-20 PROCEDURE — 99212 OFFICE O/P EST SF 10 MIN: CPT | Performed by: NURSE PRACTITIONER

## 2024-11-20 RX ORDER — ONDANSETRON 4 MG/1
12 TABLET, ORALLY DISINTEGRATING ORAL
Qty: 30 | Refills: 4 | Status: ACTIVE
Start: 2024-11-20

## 2024-11-20 RX ORDER — DICYCLOMINE HYDROCHLORIDE 20 MG/1
TABLET ORAL
Qty: 180 | Refills: 3 | Status: ACTIVE

## 2024-11-20 RX ORDER — NALOXEGOL OXALATE 25 MG/1
TABLET, FILM COATED ORAL
Qty: 90 | Refills: 3 | Status: ACTIVE

## 2024-11-20 RX ORDER — OMEPRAZOLE 40 MG/1
CAPSULE, DELAYED RELEASE ORAL
Qty: 90 | Refills: 4 | Status: ACTIVE

## 2024-11-20 RX ORDER — LINACLOTIDE 72 UG/1
CAPSULE, GELATIN COATED ORAL
Qty: 90 | Refills: 3 | Status: ACTIVE

## 2025-01-21 ENCOUNTER — APPOINTMENT (OUTPATIENT)
Dept: GENERAL RADIOLOGY | Facility: HOSPITAL | Age: 65
DRG: 190 | End: 2025-01-21
Payer: MEDICARE

## 2025-01-21 ENCOUNTER — HOSPITAL ENCOUNTER (INPATIENT)
Facility: HOSPITAL | Age: 65
LOS: 3 days | Discharge: HOME | DRG: 190 | End: 2025-01-24
Attending: EMERGENCY MEDICINE | Admitting: HOSPITALIST
Payer: MEDICARE

## 2025-01-21 DIAGNOSIS — R09.02 HYPOXIA: Primary | ICD-10-CM

## 2025-01-21 DIAGNOSIS — I50.1 PULMONARY EDEMA CARDIAC CAUSE: ICD-10-CM

## 2025-01-21 DIAGNOSIS — J44.9 COPD, MODERATE: ICD-10-CM

## 2025-01-21 DIAGNOSIS — R79.1 ELEVATED INR: ICD-10-CM

## 2025-01-21 PROBLEM — J96.01 ACUTE HYPOXEMIC RESPIRATORY FAILURE: Status: ACTIVE | Noted: 2025-01-21

## 2025-01-21 LAB
ALBUMIN SERPL-MCNC: 3.3 G/DL (ref 3.5–5.2)
ALBUMIN/GLOB SERPL: 0.9 G/DL
ALP SERPL-CCNC: 88 U/L (ref 39–117)
ALT SERPL W P-5'-P-CCNC: 12 U/L (ref 1–33)
ANION GAP SERPL CALCULATED.3IONS-SCNC: 7.2 MMOL/L (ref 5–15)
AST SERPL-CCNC: 17 U/L (ref 1–32)
BASOPHILS # BLD AUTO: 0.08 10*3/MM3 (ref 0–0.2)
BASOPHILS NFR BLD AUTO: 0.7 % (ref 0–1.5)
BILIRUB SERPL-MCNC: 0.2 MG/DL (ref 0–1.2)
BILIRUB UR QL STRIP: NEGATIVE
BUN SERPL-MCNC: 6 MG/DL (ref 8–23)
BUN/CREAT SERPL: 8.8 (ref 7–25)
CALCIUM SPEC-SCNC: 9.1 MG/DL (ref 8.6–10.5)
CHLORIDE SERPL-SCNC: 104 MMOL/L (ref 98–107)
CLARITY UR: CLEAR
CO2 SERPL-SCNC: 27.8 MMOL/L (ref 22–29)
COLOR UR: YELLOW
CREAT SERPL-MCNC: 0.68 MG/DL (ref 0.57–1)
D DIMER PPP FEU-MCNC: 0.26 MCGFEU/ML (ref 0–0.62)
D-LACTATE SERPL-SCNC: 1.1 MMOL/L (ref 0.5–2)
D-LACTATE SERPL-SCNC: 2.2 MMOL/L (ref 0.5–2)
DEPRECATED RDW RBC AUTO: 51.1 FL (ref 37–54)
EGFRCR SERPLBLD CKD-EPI 2021: 98.6 ML/MIN/1.73
EOSINOPHIL # BLD AUTO: 0.09 10*3/MM3 (ref 0–0.4)
EOSINOPHIL NFR BLD AUTO: 0.8 % (ref 0.3–6.2)
ERYTHROCYTE [DISTWIDTH] IN BLOOD BY AUTOMATED COUNT: 15.1 % (ref 12.3–15.4)
FLUAV SUBTYP SPEC NAA+PROBE: NOT DETECTED
FLUBV RNA ISLT QL NAA+PROBE: NOT DETECTED
GEN 5 1HR TROPONIN T REFLEX: 9 NG/L
GLOBULIN UR ELPH-MCNC: 3.5 GM/DL
GLUCOSE SERPL-MCNC: 241 MG/DL (ref 65–99)
GLUCOSE UR STRIP-MCNC: NEGATIVE MG/DL
HCT VFR BLD AUTO: 41.3 % (ref 34–46.6)
HGB BLD-MCNC: 12.4 G/DL (ref 12–15.9)
HGB UR QL STRIP.AUTO: ABNORMAL
IMM GRANULOCYTES # BLD AUTO: 0.06 10*3/MM3 (ref 0–0.05)
IMM GRANULOCYTES NFR BLD AUTO: 0.5 % (ref 0–0.5)
INR PPP: 7.1 (ref 0.8–1.2)
KETONES UR QL STRIP: NEGATIVE
LEUKOCYTE ESTERASE UR QL STRIP.AUTO: NEGATIVE
LYMPHOCYTES # BLD AUTO: 1.43 10*3/MM3 (ref 0.7–3.1)
LYMPHOCYTES NFR BLD AUTO: 13.1 % (ref 19.6–45.3)
MCH RBC QN AUTO: 27.4 PG (ref 26.6–33)
MCHC RBC AUTO-ENTMCNC: 30 G/DL (ref 31.5–35.7)
MCV RBC AUTO: 91.2 FL (ref 79–97)
MONOCYTES # BLD AUTO: 0.65 10*3/MM3 (ref 0.1–0.9)
MONOCYTES NFR BLD AUTO: 5.9 % (ref 5–12)
NEUTROPHILS NFR BLD AUTO: 79 % (ref 42.7–76)
NEUTROPHILS NFR BLD AUTO: 8.63 10*3/MM3 (ref 1.7–7)
NITRITE UR QL STRIP: NEGATIVE
PH UR STRIP.AUTO: <=5 [PH] (ref 5–8)
PLATELET # BLD AUTO: 444 10*3/MM3 (ref 140–450)
PMV BLD AUTO: 9.8 FL (ref 6–12)
POTASSIUM SERPL-SCNC: 4.1 MMOL/L (ref 3.5–5.2)
PROT SERPL-MCNC: 6.8 G/DL (ref 6–8.5)
PROT UR QL STRIP: NEGATIVE
PROTHROMBIN TIME: 65.1 SECONDS
QT INTERVAL: 396 MS
QTC INTERVAL: 506 MS
RBC # BLD AUTO: 4.53 10*6/MM3 (ref 3.77–5.28)
SARS-COV-2 RNA RESP QL NAA+PROBE: NOT DETECTED
SODIUM SERPL-SCNC: 139 MMOL/L (ref 136–145)
SP GR UR STRIP: 1.01 (ref 1–1.03)
TROPONIN T NUMERIC DELTA: -1 NG/L
TROPONIN T SERPL HS-MCNC: 10 NG/L
UROBILINOGEN UR QL STRIP: ABNORMAL
WBC NRBC COR # BLD AUTO: 10.94 10*3/MM3 (ref 3.4–10.8)

## 2025-01-21 PROCEDURE — 93005 ELECTROCARDIOGRAM TRACING: CPT | Performed by: EMERGENCY MEDICINE

## 2025-01-21 PROCEDURE — 87637 SARSCOV2&INF A&B&RSV AMP PRB: CPT | Performed by: EMERGENCY MEDICINE

## 2025-01-21 PROCEDURE — 99285 EMERGENCY DEPT VISIT HI MDM: CPT

## 2025-01-21 PROCEDURE — 84484 ASSAY OF TROPONIN QUANT: CPT | Performed by: EMERGENCY MEDICINE

## 2025-01-21 PROCEDURE — 85379 FIBRIN DEGRADATION QUANT: CPT | Performed by: NURSE PRACTITIONER

## 2025-01-21 PROCEDURE — 25810000003 SODIUM CHLORIDE 0.9 % SOLUTION: Performed by: NURSE PRACTITIONER

## 2025-01-21 PROCEDURE — 83605 ASSAY OF LACTIC ACID: CPT | Performed by: NURSE PRACTITIONER

## 2025-01-21 PROCEDURE — 99284 EMERGENCY DEPT VISIT MOD MDM: CPT | Performed by: NURSE PRACTITIONER

## 2025-01-21 PROCEDURE — 85025 COMPLETE CBC W/AUTO DIFF WBC: CPT | Performed by: NURSE PRACTITIONER

## 2025-01-21 PROCEDURE — 36415 COLL VENOUS BLD VENIPUNCTURE: CPT

## 2025-01-21 PROCEDURE — 94761 N-INVAS EAR/PLS OXIMETRY MLT: CPT

## 2025-01-21 PROCEDURE — 85610 PROTHROMBIN TIME: CPT | Performed by: NURSE PRACTITIONER

## 2025-01-21 PROCEDURE — 81003 URINALYSIS AUTO W/O SCOPE: CPT | Performed by: NURSE PRACTITIONER

## 2025-01-21 PROCEDURE — 80053 COMPREHEN METABOLIC PANEL: CPT | Performed by: NURSE PRACTITIONER

## 2025-01-21 PROCEDURE — 71046 X-RAY EXAM CHEST 2 VIEWS: CPT

## 2025-01-21 PROCEDURE — 93010 ELECTROCARDIOGRAM REPORT: CPT | Performed by: EMERGENCY MEDICINE

## 2025-01-21 RX ORDER — SODIUM CHLORIDE 9 MG/ML
40 INJECTION, SOLUTION INTRAVENOUS AS NEEDED
Status: DISCONTINUED | OUTPATIENT
Start: 2025-01-21 | End: 2025-01-24 | Stop reason: HOSPADM

## 2025-01-21 RX ORDER — ACETAMINOPHEN 160 MG/5ML
650 SOLUTION ORAL EVERY 4 HOURS PRN
Status: DISCONTINUED | OUTPATIENT
Start: 2025-01-21 | End: 2025-01-24 | Stop reason: HOSPADM

## 2025-01-21 RX ORDER — ACETAMINOPHEN 650 MG/1
650 SUPPOSITORY RECTAL EVERY 4 HOURS PRN
Status: DISCONTINUED | OUTPATIENT
Start: 2025-01-21 | End: 2025-01-24 | Stop reason: HOSPADM

## 2025-01-21 RX ORDER — DICYCLOMINE HCL 20 MG
20 TABLET ORAL 2 TIMES DAILY
COMMUNITY

## 2025-01-21 RX ORDER — CALCIUM CARBONATE 500 MG/1
2 TABLET, CHEWABLE ORAL 3 TIMES DAILY PRN
Status: DISCONTINUED | OUTPATIENT
Start: 2025-01-21 | End: 2025-01-24 | Stop reason: HOSPADM

## 2025-01-21 RX ORDER — POLYETHYLENE GLYCOL 3350 17 G/17G
17 POWDER, FOR SOLUTION ORAL DAILY PRN
Status: DISCONTINUED | OUTPATIENT
Start: 2025-01-21 | End: 2025-01-24 | Stop reason: HOSPADM

## 2025-01-21 RX ORDER — ONDANSETRON 4 MG/1
4 TABLET, ORALLY DISINTEGRATING ORAL EVERY 6 HOURS PRN
Status: DISCONTINUED | OUTPATIENT
Start: 2025-01-21 | End: 2025-01-24 | Stop reason: HOSPADM

## 2025-01-21 RX ORDER — AMOXICILLIN 250 MG
2 CAPSULE ORAL 2 TIMES DAILY PRN
Status: DISCONTINUED | OUTPATIENT
Start: 2025-01-21 | End: 2025-01-24 | Stop reason: HOSPADM

## 2025-01-21 RX ORDER — BISACODYL 5 MG/1
5 TABLET, DELAYED RELEASE ORAL DAILY PRN
Status: DISCONTINUED | OUTPATIENT
Start: 2025-01-21 | End: 2025-01-24 | Stop reason: HOSPADM

## 2025-01-21 RX ORDER — ACETAMINOPHEN 325 MG/1
650 TABLET ORAL EVERY 4 HOURS PRN
Status: DISCONTINUED | OUTPATIENT
Start: 2025-01-21 | End: 2025-01-24 | Stop reason: HOSPADM

## 2025-01-21 RX ORDER — ONDANSETRON 2 MG/ML
4 INJECTION INTRAMUSCULAR; INTRAVENOUS EVERY 6 HOURS PRN
Status: DISCONTINUED | OUTPATIENT
Start: 2025-01-21 | End: 2025-01-24 | Stop reason: HOSPADM

## 2025-01-21 RX ORDER — BISACODYL 10 MG
10 SUPPOSITORY, RECTAL RECTAL DAILY PRN
Status: DISCONTINUED | OUTPATIENT
Start: 2025-01-21 | End: 2025-01-24 | Stop reason: HOSPADM

## 2025-01-21 RX ORDER — SODIUM CHLORIDE 0.9 % (FLUSH) 0.9 %
10 SYRINGE (ML) INJECTION EVERY 12 HOURS SCHEDULED
Status: DISCONTINUED | OUTPATIENT
Start: 2025-01-21 | End: 2025-01-24 | Stop reason: HOSPADM

## 2025-01-21 RX ORDER — SODIUM CHLORIDE 0.9 % (FLUSH) 0.9 %
10 SYRINGE (ML) INJECTION AS NEEDED
Status: DISCONTINUED | OUTPATIENT
Start: 2025-01-21 | End: 2025-01-24 | Stop reason: HOSPADM

## 2025-01-21 RX ADMIN — SODIUM CHLORIDE 1000 ML: 9 INJECTION, SOLUTION INTRAVENOUS at 16:13

## 2025-01-21 RX ADMIN — Medication 10 ML: at 23:47

## 2025-01-21 NOTE — FSED PROVIDER NOTE
"Subjective   History of Present Illness  64-year-old female with history of COPD presents with cough shortness of breath.  She states she has had a cough for about 4 to 5 days and the shortness of breath started yesterday.  Grandson was diagnosed with pneumonia.  In triage her O2 sats were 88% on room air.  She does not have oxygen at home, she only has a CPAP.  She states Medicaid states that she \"cannot have both\".    History provided by:  Patient   used: No        Review of Systems   Constitutional:  Negative for fever.   HENT:  Positive for sore throat. Negative for ear pain and postnasal drip.         Patient states she occasionally has slight sore throat and laryngitis.   Respiratory:  Positive for cough and shortness of breath. Negative for wheezing.         Patient has history of COPD and has a cough and feels short of breath today.  Cough is been going on for about 4 to 5 days.  She states occasionally she will have some slight yellow productive cough.   Cardiovascular:  Positive for chest pain.        Patient's had a history of cardiac catheterization and cardiac surgery.  She does have a pacemaker.   Gastrointestinal:  Negative for diarrhea, nausea and vomiting.   Skin:  Negative for color change.   Neurological:  Negative for headaches.   All other systems reviewed and are negative.      Past Medical History:   Diagnosis Date    Arthritis     Cardiomyopathy     CHF (congestive heart failure)     COPD (chronic obstructive pulmonary disease)     Endometriosis     Hypertension     Mitral valve stenosis     Rheumatic fever     Thyroid disorder 2/4/2019       Allergies   Allergen Reactions    Metronidazole Rash    Budesonide Rash    Clindamycin Rash       Past Surgical History:   Procedure Laterality Date    ABDOMINAL SURGERY      CARDIAC CATHETERIZATION Left 1/25/2017    Procedure: Cardiac Catheterization/Vascular Study;  Surgeon: Pino Umaña MD;  Location: [facility] INVASIVE " LOCATION;  Service:     CARDIAC SURGERY       SECTION      CHOLECYSTECTOMY      D & C HYSTEROSCOPY WITH NOVASURE ENDOMETRIAL ABLATION AND MYOSURE      MITRAL VALVE REPLACEMENT      OTHER SURGICAL HISTORY      CARDIAC CATH PROCEDURE OUTCOME: SUCCESSFUL    TONSILLECTOMY      TUBAL ABDOMINAL LIGATION      VASCULAR SURGERY         Family History   Problem Relation Age of Onset    Lung cancer Father     Lung cancer Brother        Social History     Socioeconomic History    Marital status:    Tobacco Use    Smoking status: Every Day     Current packs/day: 0.00     Average packs/day: 0.5 packs/day for 15.0 years (7.5 ttl pk-yrs)     Types: Cigarettes     Start date: 2000     Last attempt to quit: 2015     Years since quittin.9    Smokeless tobacco: Never   Substance and Sexual Activity    Alcohol use: No    Drug use: No    Sexual activity: Never           Objective   Physical Exam  Vitals and nursing note reviewed.   Constitutional:       General: She is not in acute distress.     Appearance: She is well-developed. She is not ill-appearing, toxic-appearing or diaphoretic.   HENT:      Mouth/Throat:      Mouth: Mucous membranes are moist.      Pharynx: Oropharynx is clear.   Cardiovascular:      Rate and Rhythm: Normal rate and regular rhythm.      Heart sounds: No murmur heard.     No gallop.   Pulmonary:      Effort: Pulmonary effort is normal. No tachypnea, bradypnea, accessory muscle usage or respiratory distress.      Breath sounds: Examination of the right-upper field reveals decreased breath sounds. Examination of the left-upper field reveals decreased breath sounds. Examination of the right-middle field reveals decreased breath sounds. Examination of the left-middle field reveals decreased breath sounds. Examination of the right-lower field reveals decreased breath sounds. Examination of the left-lower field reveals decreased breath sounds. Decreased breath sounds present. No wheezing or  rhonchi.   Abdominal:      Palpations: Abdomen is soft. There is no splenomegaly or mass.      Tenderness: There is no abdominal tenderness.   Skin:     General: Skin is warm and dry.   Neurological:      Mental Status: She is alert and oriented to person, place, and time.   Psychiatric:         Mood and Affect: Mood normal.         Behavior: Behavior normal.         ECG 12 Lead      Date/Time: 1/21/2025 2:50 PM    Performed by: Umm Geller APRN  Authorized by: Aric Quintana MD  Interpreted by ED physician  Comparison: compared with previous ECG from 11/12/2023  Similar to previous ECG  Rhythm comments: Atrial sensed ventricular paced rhythm  Rate: normal  Pacing capture: Atrial sensed ventricular paced rhythm.  Clinical impression: normal ECG               ED Course  ED Course as of 01/21/25 1823 Tue Jan 21, 2025   1518 COVID19: Not Detected [SJ]   1518 Influenza A PCR: Not Detected [SJ]   1518 Influenza B PCR: Not Detected [SJ]   1518 Protime: 65.1 [SJ]   1519 INR(!): 7.1 [SJ]   1528 WBC(!): 10.94 [SJ]   1528 RBC: 4.53 [SJ]   1528 Hemoglobin: 12.4 [SJ]   1528 Hematocrit: 41.3 [SJ]   1528 MCV: 91.2 [SJ]   1528 MCH: 27.4 [SJ]   1528 MCHC(!): 30.0 [SJ]   1529 RDW: 15.1 [SJ]   1529 RDW-SD: 51.1 [SJ]   1529 MPV: 9.8 [SJ]   1529 Platelets: 444 [SJ]   1529 Neutrophil Rel %(!): 79.0 [SJ]   1529 Lymphocyte Rel %(!): 13.1 [SJ]   1529 Monocyte Rel %: 5.9 [SJ]   1529 Eosinophil Rel %: 0.8 [SJ]   1529 Basophil Rel %: 0.7 [SJ]   1529 Immature Granulocyte Rel %: 0.5 [SJ]   1529 Neutrophils Absolute(!): 8.63 [SJ]   1529 Lymphocytes Absolute: 1.43 [SJ]   1529 Monocytes Absolute: 0.65 [SJ]   1529 Eosinophils Absolute: 0.09 [SJ]   1529 Basophils Absolute: 0.08 [SJ]   1529 Immature Grans, Absolute(!): 0.06 [SJ]   1536 Reading Physician Reading Date Result Priority  Ayush Keita MD  [phone] 1/21/2025 STAT    Narrative & Impression  XR CHEST PA AND LATERAL     Date of Exam: 1/21/2025 3:16 PM EST     Indication: cough,  shortness of breath     Comparison: Chest CT 8/29/2024     Findings:  Cardiomegaly. Multichamber AICD. Mitral valve repair. Chronic appearing interstitial changes without lobar consolidation, edema, effusion or pneumothorax. Degenerative related osseous findings. Exaggerated thoracic kyphosis. Multiple chronic appearing   thoracic compression deformities similar to previous CT comparison.     IMPRESSION:  Impression:  Chronic findings without convincing active pulmonary process.        Electronically Signed: Ayush Keita MD    1/21/2025 3:33 PM EST    Workstation ID: [id number]   [SJ]   1543 D-Dimer, Quant: 0.26 [SJ]   1543 HS Troponin T: 10 [SJ]   1543 Lactate(!!): 2.2 [SJ]   1543 Glucose(!): 241 [SJ]   1543 BUN(!): 6 [SJ]   1543 Creatinine: 0.68 [SJ]   1543 Sodium: 139 [SJ]   1543 Potassium: 4.1 [SJ]   1543 Chloride: 104 [SJ]   1543 CO2: 27.8 [SJ]   1543 Calcium: 9.1 [SJ]   1543 Total Protein: 6.8 [SJ]   1543 Albumin(!): 3.3 [SJ]   1543 ALT (SGPT): 12 [SJ]   1543 AST (SGOT): 17 [SJ]   1543 Alkaline Phosphatase: 88 [SJ]   1543 Total Bilirubin: 0.2 [SJ]   1543 Globulin: 3.5 [SJ]   1543 A/G Ratio: 0.9 [SJ]   1544 BUN/Creatinine Ratio: 8.8 [SJ]   1544 Anion Gap: 7.2 [SJ]   1544 eGFR: 98.6 [SJ]   1605 HS Troponin T: 10 [SJ]   1640 HS Troponin T: 9 [SJ]   1640 Troponin T Numeric Delta: -1 [SJ]   1808 Calling hospitalist, her O2 sats dropped to 88% on room air.    [SJ]   1813 Hospitalist line answered,will contact Dr Munson.   [SJ]   1816 Color, UA: Yellow [SJ]   1817 Appearance, UA: Clear [SJ]   1817 pH, UA: <=5.0 [SJ]   1817 Specific Gravity, UA: 1.010 [SJ]   1817 Glucose: Negative [SJ]   1817 Ketones, UA: Negative [SJ]   1817 Bilirubin, UA: Negative [SJ]   1817 Blood, UA(!): Trace [SJ]   1817 Protein, UA: Negative [SJ]   1817 Leukocytes, UA: Negative []   1817 Nitrite, UA: Negative []   1817 Urobilinogen, UA: 0.2 E.U./dL []      ED Course User Index  [] Umm Geller, APRN                                            Medical Decision Making  64-year-old female presents with 4-day history of cough and shortness of breath that started yesterday.  Her grandson was diagnosed with pneumonia.  Patient's O2 sats on arrival were 88%  on room air.  She has a history of diabetes, pacemaker placement, COPD.  Patient does not use oxygen at home, she reports that Medicaid said she could either have a CPAP or oxygen but she could not have both.  Patient's white count slightly elevated at 10.94, INR is elevated at 7.1.  Glucose is elevated at 241.  Patient's lactic acid was also elevated at 2.2.  Chest x-ray showed chronic changes but no pneumonia.  D-dimer was 0.26.  COVID flu were negative.  Dr. Lopez was notified and accepted the patient for transfer.  The patient and her family members are also on board for transfer.  Patient's differential diagnoses include chest pain, MI, PE, pneumonia, COVID, flu, electrolyte disorder, renal failure.    Problems Addressed:  Elevated INR: complicated acute illness or injury  Hypoxia: complicated acute illness or injury    Amount and/or Complexity of Data Reviewed  Labs: ordered. Decision-making details documented in ED Course.  Radiology: ordered.  ECG/medicine tests: ordered and independent interpretation performed.    Risk  Decision regarding hospitalization.        Final diagnoses:   Hypoxia   Elevated INR       ED Disposition  ED Disposition       ED Disposition   Decision to Admit    Condition   --    Comment   Level of Care: Telemetry [5]   Admitting Physician: MY LOPEZ [[id number]]   Attending Physician: MY LOPEZ [[id number]]                 No follow-up provider specified.       Medication List      No changes were made to your prescriptions during this visit.          no

## 2025-01-22 ENCOUNTER — APPOINTMENT (OUTPATIENT)
Dept: CT IMAGING | Facility: HOSPITAL | Age: 65
DRG: 190 | End: 2025-01-22
Payer: MEDICARE

## 2025-01-22 LAB
ALBUMIN SERPL-MCNC: 3.1 G/DL (ref 3.5–5.2)
ALBUMIN/GLOB SERPL: 0.9 G/DL
ALP SERPL-CCNC: 89 U/L (ref 39–117)
ALT SERPL W P-5'-P-CCNC: 21 U/L (ref 1–33)
ANION GAP SERPL CALCULATED.3IONS-SCNC: 8.5 MMOL/L (ref 5–15)
AST SERPL-CCNC: 36 U/L (ref 1–32)
BASOPHILS # BLD AUTO: 0.08 10*3/MM3 (ref 0–0.2)
BASOPHILS NFR BLD AUTO: 0.7 % (ref 0–1.5)
BILIRUB SERPL-MCNC: 0.2 MG/DL (ref 0–1.2)
BUN SERPL-MCNC: 4 MG/DL (ref 8–23)
BUN/CREAT SERPL: 6.6 (ref 7–25)
CALCIUM SPEC-SCNC: 8.7 MG/DL (ref 8.6–10.5)
CHLORIDE SERPL-SCNC: 103 MMOL/L (ref 98–107)
CO2 SERPL-SCNC: 26.5 MMOL/L (ref 22–29)
CREAT SERPL-MCNC: 0.61 MG/DL (ref 0.57–1)
DEPRECATED RDW RBC AUTO: 49.7 FL (ref 37–54)
EGFRCR SERPLBLD CKD-EPI 2021: 101.2 ML/MIN/1.73
EOSINOPHIL # BLD AUTO: 0.13 10*3/MM3 (ref 0–0.4)
EOSINOPHIL NFR BLD AUTO: 1.1 % (ref 0.3–6.2)
ERYTHROCYTE [DISTWIDTH] IN BLOOD BY AUTOMATED COUNT: 15.1 % (ref 12.3–15.4)
GEN 5 1HR TROPONIN T REFLEX: 7 NG/L
GLOBULIN UR ELPH-MCNC: 3.5 GM/DL
GLUCOSE BLDC GLUCOMTR-MCNC: 165 MG/DL (ref 70–105)
GLUCOSE BLDC GLUCOMTR-MCNC: 169 MG/DL (ref 70–105)
GLUCOSE BLDC GLUCOMTR-MCNC: 238 MG/DL (ref 70–105)
GLUCOSE BLDC GLUCOMTR-MCNC: 259 MG/DL (ref 70–105)
GLUCOSE BLDC GLUCOMTR-MCNC: 313 MG/DL (ref 70–105)
GLUCOSE SERPL-MCNC: 158 MG/DL (ref 65–99)
HCT VFR BLD AUTO: 38.3 % (ref 34–46.6)
HGB BLD-MCNC: 11.8 G/DL (ref 12–15.9)
IMM GRANULOCYTES # BLD AUTO: 0.1 10*3/MM3 (ref 0–0.05)
IMM GRANULOCYTES NFR BLD AUTO: 0.9 % (ref 0–0.5)
INR PPP: 6.07 (ref 2–3)
LYMPHOCYTES # BLD AUTO: 1.7 10*3/MM3 (ref 0.7–3.1)
LYMPHOCYTES NFR BLD AUTO: 14.7 % (ref 19.6–45.3)
MAGNESIUM SERPL-MCNC: 1.5 MG/DL (ref 1.6–2.4)
MCH RBC QN AUTO: 27.7 PG (ref 26.6–33)
MCHC RBC AUTO-ENTMCNC: 30.8 G/DL (ref 31.5–35.7)
MCV RBC AUTO: 89.9 FL (ref 79–97)
MONOCYTES # BLD AUTO: 0.8 10*3/MM3 (ref 0.1–0.9)
MONOCYTES NFR BLD AUTO: 6.9 % (ref 5–12)
NEUTROPHILS NFR BLD AUTO: 75.7 % (ref 42.7–76)
NEUTROPHILS NFR BLD AUTO: 8.73 10*3/MM3 (ref 1.7–7)
NRBC BLD AUTO-RTO: 0 /100 WBC (ref 0–0.2)
PHOSPHATE SERPL-MCNC: 3.7 MG/DL (ref 2.5–4.5)
PLATELET # BLD AUTO: 395 10*3/MM3 (ref 140–450)
PMV BLD AUTO: 9.7 FL (ref 6–12)
POTASSIUM SERPL-SCNC: 4.2 MMOL/L (ref 3.5–5.2)
PROCALCITONIN SERPL-MCNC: 0.06 NG/ML (ref 0–0.25)
PROT SERPL-MCNC: 6.6 G/DL (ref 6–8.5)
PROTHROMBIN TIME: 53.4 SECONDS (ref 19.4–28.5)
QT INTERVAL: 415 MS
QTC INTERVAL: 527 MS
RBC # BLD AUTO: 4.26 10*6/MM3 (ref 3.77–5.28)
RSV RNA RESP QL NAA+PROBE: NOT DETECTED
SODIUM SERPL-SCNC: 138 MMOL/L (ref 136–145)
TROPONIN T NUMERIC DELTA: 0 NG/L
TROPONIN T SERPL HS-MCNC: 7 NG/L
WBC NRBC COR # BLD AUTO: 11.54 10*3/MM3 (ref 3.4–10.8)

## 2025-01-22 PROCEDURE — 94799 UNLISTED PULMONARY SVC/PX: CPT

## 2025-01-22 PROCEDURE — 82948 REAGENT STRIP/BLOOD GLUCOSE: CPT | Performed by: HOSPITALIST

## 2025-01-22 PROCEDURE — 82948 REAGENT STRIP/BLOOD GLUCOSE: CPT

## 2025-01-22 PROCEDURE — 94640 AIRWAY INHALATION TREATMENT: CPT

## 2025-01-22 PROCEDURE — 84484 ASSAY OF TROPONIN QUANT: CPT | Performed by: NURSE PRACTITIONER

## 2025-01-22 PROCEDURE — 71250 CT THORAX DX C-: CPT

## 2025-01-22 PROCEDURE — 85610 PROTHROMBIN TIME: CPT | Performed by: HOSPITALIST

## 2025-01-22 PROCEDURE — 84145 PROCALCITONIN (PCT): CPT | Performed by: HOSPITALIST

## 2025-01-22 PROCEDURE — 80053 COMPREHEN METABOLIC PANEL: CPT | Performed by: HOSPITALIST

## 2025-01-22 PROCEDURE — 94664 DEMO&/EVAL PT USE INHALER: CPT

## 2025-01-22 PROCEDURE — 84100 ASSAY OF PHOSPHORUS: CPT | Performed by: HOSPITALIST

## 2025-01-22 PROCEDURE — 83735 ASSAY OF MAGNESIUM: CPT | Performed by: HOSPITALIST

## 2025-01-22 PROCEDURE — 25010000002 METHYLPREDNISOLONE PER 40 MG: Performed by: HOSPITALIST

## 2025-01-22 PROCEDURE — 25810000003 SODIUM CHLORIDE 0.9 % SOLUTION: Performed by: NURSE PRACTITIONER

## 2025-01-22 PROCEDURE — 94761 N-INVAS EAR/PLS OXIMETRY MLT: CPT

## 2025-01-22 PROCEDURE — 85025 COMPLETE CBC W/AUTO DIFF WBC: CPT | Performed by: HOSPITALIST

## 2025-01-22 PROCEDURE — 93005 ELECTROCARDIOGRAM TRACING: CPT | Performed by: NURSE PRACTITIONER

## 2025-01-22 PROCEDURE — 25010000002 MAGNESIUM SULFATE 2 GM/50ML SOLUTION: Performed by: FAMILY MEDICINE

## 2025-01-22 PROCEDURE — 63710000001 INSULIN LISPRO (HUMAN) PER 5 UNITS: Performed by: HOSPITALIST

## 2025-01-22 RX ORDER — METHYLPREDNISOLONE SODIUM SUCCINATE 40 MG/ML
40 INJECTION, POWDER, LYOPHILIZED, FOR SOLUTION INTRAMUSCULAR; INTRAVENOUS EVERY 8 HOURS
Status: DISCONTINUED | OUTPATIENT
Start: 2025-01-22 | End: 2025-01-23

## 2025-01-22 RX ORDER — SULFASALAZINE 500 MG/1
500 TABLET ORAL 2 TIMES DAILY
Status: DISCONTINUED | OUTPATIENT
Start: 2025-01-22 | End: 2025-01-24 | Stop reason: HOSPADM

## 2025-01-22 RX ORDER — MAGNESIUM SULFATE HEPTAHYDRATE 40 MG/ML
2 INJECTION, SOLUTION INTRAVENOUS
Status: COMPLETED | OUTPATIENT
Start: 2025-01-22 | End: 2025-01-22

## 2025-01-22 RX ORDER — FUROSEMIDE 20 MG/1
20 TABLET ORAL DAILY
Status: DISCONTINUED | OUTPATIENT
Start: 2025-01-22 | End: 2025-01-24 | Stop reason: HOSPADM

## 2025-01-22 RX ORDER — DEXTROSE MONOHYDRATE 25 G/50ML
25 INJECTION, SOLUTION INTRAVENOUS
Status: DISCONTINUED | OUTPATIENT
Start: 2025-01-22 | End: 2025-01-24 | Stop reason: HOSPADM

## 2025-01-22 RX ORDER — NICOTINE POLACRILEX 4 MG
15 LOZENGE BUCCAL
Status: DISCONTINUED | OUTPATIENT
Start: 2025-01-22 | End: 2025-01-24 | Stop reason: HOSPADM

## 2025-01-22 RX ORDER — ASPIRIN 81 MG/1
81 TABLET ORAL DAILY
Status: DISCONTINUED | OUTPATIENT
Start: 2025-01-22 | End: 2025-01-22

## 2025-01-22 RX ORDER — GUAIFENESIN 600 MG/1
600 TABLET, EXTENDED RELEASE ORAL EVERY 12 HOURS SCHEDULED
Status: DISCONTINUED | OUTPATIENT
Start: 2025-01-22 | End: 2025-01-22

## 2025-01-22 RX ORDER — LEVOTHYROXINE SODIUM 50 UG/1
50 TABLET ORAL
Status: DISCONTINUED | OUTPATIENT
Start: 2025-01-22 | End: 2025-01-24 | Stop reason: HOSPADM

## 2025-01-22 RX ORDER — HYDROCODONE BITARTRATE AND ACETAMINOPHEN 7.5; 325 MG/1; MG/1
1 TABLET ORAL EVERY 8 HOURS PRN
Status: DISCONTINUED | OUTPATIENT
Start: 2025-01-22 | End: 2025-01-24 | Stop reason: HOSPADM

## 2025-01-22 RX ORDER — LUBIPROSTONE 8 UG/1
8 CAPSULE ORAL 2 TIMES DAILY
Status: DISCONTINUED | OUTPATIENT
Start: 2025-01-22 | End: 2025-01-24 | Stop reason: HOSPADM

## 2025-01-22 RX ORDER — IPRATROPIUM BROMIDE AND ALBUTEROL SULFATE 2.5; .5 MG/3ML; MG/3ML
3 SOLUTION RESPIRATORY (INHALATION)
Status: DISCONTINUED | OUTPATIENT
Start: 2025-01-22 | End: 2025-01-24 | Stop reason: HOSPADM

## 2025-01-22 RX ORDER — METOPROLOL TARTRATE 25 MG/1
25 TABLET, FILM COATED ORAL ONCE
Status: COMPLETED | OUTPATIENT
Start: 2025-01-22 | End: 2025-01-22

## 2025-01-22 RX ORDER — PAROXETINE 20 MG/1
20 TABLET, FILM COATED ORAL DAILY
Status: DISCONTINUED | OUTPATIENT
Start: 2025-01-22 | End: 2025-01-22

## 2025-01-22 RX ORDER — ATORVASTATIN CALCIUM 20 MG/1
20 TABLET, FILM COATED ORAL DAILY
Status: DISCONTINUED | OUTPATIENT
Start: 2025-01-22 | End: 2025-01-24 | Stop reason: HOSPADM

## 2025-01-22 RX ORDER — LISINOPRIL 5 MG/1
5 TABLET ORAL
Status: DISCONTINUED | OUTPATIENT
Start: 2025-01-22 | End: 2025-01-22

## 2025-01-22 RX ORDER — METOPROLOL TARTRATE 25 MG/1
25 TABLET, FILM COATED ORAL 2 TIMES DAILY
Status: DISCONTINUED | OUTPATIENT
Start: 2025-01-22 | End: 2025-01-24 | Stop reason: HOSPADM

## 2025-01-22 RX ORDER — INSULIN LISPRO 100 [IU]/ML
2-7 INJECTION, SOLUTION INTRAVENOUS; SUBCUTANEOUS
Status: DISCONTINUED | OUTPATIENT
Start: 2025-01-22 | End: 2025-01-23

## 2025-01-22 RX ORDER — ZOLPIDEM TARTRATE 5 MG/1
5 TABLET ORAL NIGHTLY PRN
Status: DISCONTINUED | OUTPATIENT
Start: 2025-01-22 | End: 2025-01-24 | Stop reason: HOSPADM

## 2025-01-22 RX ORDER — DOXYCYCLINE 100 MG/1
100 CAPSULE ORAL EVERY 12 HOURS
Status: DISCONTINUED | OUTPATIENT
Start: 2025-01-22 | End: 2025-01-24 | Stop reason: HOSPADM

## 2025-01-22 RX ORDER — PANTOPRAZOLE SODIUM 40 MG/1
40 TABLET, DELAYED RELEASE ORAL
Status: DISCONTINUED | OUTPATIENT
Start: 2025-01-22 | End: 2025-01-24 | Stop reason: HOSPADM

## 2025-01-22 RX ORDER — IBUPROFEN 600 MG/1
1 TABLET ORAL
Status: DISCONTINUED | OUTPATIENT
Start: 2025-01-22 | End: 2025-01-24 | Stop reason: HOSPADM

## 2025-01-22 RX ORDER — WARFARIN SODIUM 2 MG/1
2 TABLET ORAL
Status: COMPLETED | OUTPATIENT
Start: 2025-01-22 | End: 2025-01-22

## 2025-01-22 RX ADMIN — FUROSEMIDE 20 MG: 20 TABLET ORAL at 08:43

## 2025-01-22 RX ADMIN — METHYLPREDNISOLONE SODIUM SUCCINATE 40 MG: 40 INJECTION, POWDER, FOR SOLUTION INTRAMUSCULAR; INTRAVENOUS at 08:43

## 2025-01-22 RX ADMIN — IPRATROPIUM BROMIDE AND ALBUTEROL SULFATE 3 ML: .5; 3 SOLUTION RESPIRATORY (INHALATION) at 06:35

## 2025-01-22 RX ADMIN — INSULIN LISPRO 2 UNITS: 100 INJECTION, SOLUTION INTRAVENOUS; SUBCUTANEOUS at 01:44

## 2025-01-22 RX ADMIN — SODIUM CHLORIDE 500 ML: 9 INJECTION, SOLUTION INTRAVENOUS at 15:48

## 2025-01-22 RX ADMIN — DOXYCYCLINE 100 MG: 100 CAPSULE ORAL at 13:03

## 2025-01-22 RX ADMIN — MAGNESIUM SULFATE IN WATER FOR 2 G: 40 INJECTION INTRAVENOUS at 05:48

## 2025-01-22 RX ADMIN — IPRATROPIUM BROMIDE AND ALBUTEROL SULFATE 3 ML: .5; 3 SOLUTION RESPIRATORY (INHALATION) at 18:40

## 2025-01-22 RX ADMIN — IPRATROPIUM BROMIDE AND ALBUTEROL SULFATE 3 ML: .5; 3 SOLUTION RESPIRATORY (INHALATION) at 14:49

## 2025-01-22 RX ADMIN — METOPROLOL TARTRATE 25 MG: 25 TABLET, FILM COATED ORAL at 15:48

## 2025-01-22 RX ADMIN — LUBIPROSTONE 8 MCG: 8 CAPSULE, GELATIN COATED ORAL at 08:43

## 2025-01-22 RX ADMIN — METHYLPREDNISOLONE SODIUM SUCCINATE 40 MG: 40 INJECTION, POWDER, FOR SOLUTION INTRAMUSCULAR; INTRAVENOUS at 01:44

## 2025-01-22 RX ADMIN — ATORVASTATIN CALCIUM 20 MG: 20 TABLET, FILM COATED ORAL at 08:43

## 2025-01-22 RX ADMIN — DOXYCYCLINE 100 MG: 100 CAPSULE ORAL at 01:44

## 2025-01-22 RX ADMIN — Medication 10 ML: at 08:46

## 2025-01-22 RX ADMIN — SULFASALAZINE 500 MG: 500 TABLET ORAL at 20:36

## 2025-01-22 RX ADMIN — SULFASALAZINE 500 MG: 500 TABLET ORAL at 08:43

## 2025-01-22 RX ADMIN — IPRATROPIUM BROMIDE AND ALBUTEROL SULFATE 3 ML: .5; 3 SOLUTION RESPIRATORY (INHALATION) at 11:03

## 2025-01-22 RX ADMIN — LEVOTHYROXINE SODIUM 50 MCG: 50 TABLET ORAL at 05:48

## 2025-01-22 RX ADMIN — PANTOPRAZOLE SODIUM 40 MG: 40 TABLET, DELAYED RELEASE ORAL at 05:48

## 2025-01-22 RX ADMIN — MAGNESIUM SULFATE IN WATER FOR 2 G: 40 INJECTION INTRAVENOUS at 07:48

## 2025-01-22 RX ADMIN — WARFARIN SODIUM 2 MG: 2 TABLET ORAL at 18:14

## 2025-01-22 RX ADMIN — LUBIPROSTONE 8 MCG: 8 CAPSULE, GELATIN COATED ORAL at 20:35

## 2025-01-22 RX ADMIN — INSULIN LISPRO 3 UNITS: 100 INJECTION, SOLUTION INTRAVENOUS; SUBCUTANEOUS at 11:32

## 2025-01-22 RX ADMIN — INSULIN LISPRO 4 UNITS: 100 INJECTION, SOLUTION INTRAVENOUS; SUBCUTANEOUS at 20:36

## 2025-01-22 RX ADMIN — Medication 10 ML: at 20:36

## 2025-01-22 RX ADMIN — INSULIN LISPRO 2 UNITS: 100 INJECTION, SOLUTION INTRAVENOUS; SUBCUTANEOUS at 18:14

## 2025-01-22 RX ADMIN — MAGNESIUM SULFATE IN WATER FOR 2 G: 40 INJECTION INTRAVENOUS at 09:23

## 2025-01-22 RX ADMIN — METHYLPREDNISOLONE SODIUM SUCCINATE 40 MG: 40 INJECTION, POWDER, FOR SOLUTION INTRAMUSCULAR; INTRAVENOUS at 18:14

## 2025-01-22 RX ADMIN — HYDROCODONE BITARTRATE AND ACETAMINOPHEN 1 TABLET: 7.5; 325 TABLET ORAL at 20:36

## 2025-01-22 NOTE — PLAN OF CARE
Goal Outcome Evaluation:  Plan of Care Reviewed With: patient     Problem: Adult Inpatient Plan of Care  Goal: Plan of Care Review  Outcome: Progressing  Flowsheets (Taken 1/22/2025 0573)  Progress: no change  Plan of Care Reviewed With: patient        Progress: no change

## 2025-01-22 NOTE — PROGRESS NOTES
"Pharmacy dosing service  Anticoagulant  Warfarin     Subjective:    Nighat Montoya is a 64 y.o.female being continued on warfarin for Atrial Fibrillation / Flutter and Mechanical Mitral Valve.    INR Goal: 2.5 - 3.5  Home medication?: Unknown, pending med rec.  Bridge Therapy Present?:  No      Assessment/Plan:    Today's INR is supratherapeutic (7.1) on unknown home warfarin dose, pending medication reconciliation. Will hold warfarin today and consider resuming at lower than home dose once INR is therapeutic/trending toward therapeutic.      Continue to monitor and adjust based on INR.         Date 1/22           INR 7.1           Dose Hold               Objective:  [Ht: 154.9 cm (61\"); Wt: 63.5 kg (139 lb 14.4 oz); BMI: Body mass index is 26.43 kg/m².]    Lab Results   Component Value Date    ALBUMIN 3.3 (L) 01/21/2025     Lab Results   Component Value Date    INR 7.1 (H) 01/21/2025    INR 3.92 (H) 11/12/2023    INR 2.3 06/01/2018    PROTIME 65.1 01/21/2025    PROTIME 38.6 (H) 11/12/2023    PROTIME 23.6 06/01/2018     Lab Results   Component Value Date    HGB 12.4 01/21/2025    HGB 15.9 11/12/2023    HGB 14.2 08/12/2019     Lab Results   Component Value Date    HCT 41.3 01/21/2025    HCT 49.0 (H) 11/12/2023    HCT 44.1 08/12/2019       Eboni Ingram, PharmD  01/22/25 00:31 EST     "

## 2025-01-22 NOTE — CASE MANAGEMENT/SOCIAL WORK
Discharge Planning Assessment   [place]     Patient Name: Nighat Montoya  MRN: [id number]  Today's Date: 1/22/2025    Admit Date: 1/21/2025    Plan: JA PLAN: From routine home with spouse. Current with [facility](M-F 9-5pm)   Watch for 6 min walk.       Discharge Needs Assessment       Row Name 01/22/25 1338       Living Environment    People in Home spouse    Current Living Arrangements home    Potentially Unsafe Housing Conditions none    In the past 12 months has the electric, gas, oil, or water company threatened to shut off services in your home? No    Primary Care Provided by self    Provides Primary Care For no one    Family Caregiver if Needed spouse    Quality of Family Relationships helpful;involved;supportive    Able to Return to Prior Arrangements yes       Resource/Environmental Concerns    Resource/Environmental Concerns none    Transportation Concerns none       Transportation Needs    In the past 12 months, has lack of transportation kept you from medical appointments or from getting medications? no    In the past 12 months, has lack of transportation kept you from meetings, work, or from getting things needed for daily living? No       Food Insecurity    Within the past 12 months, you worried that your food would run out before you got the money to buy more. Never true    Within the past 12 months, the food you bought just didn't last and you didn't have money to get more. Never true       Transition Planning    Patient/Family Anticipates Transition to home with family    Patient/Family Anticipated Services at Transition none    Transportation Anticipated car, drives self;family or friend will provide       Discharge Needs Assessment    Readmission Within the Last 30 Days no previous admission in last 30 days    Equipment Currently Used at Home cpap;nebulizer;pulse ox;bp cuff;glucometer;wheelchair    Anticipated Changes Related to Illness none    Equipment Needed After Discharge none                    Discharge Plan       Row Name 01/22/25 1339       Plan    Plan DC PLAN: From routine home with spouse. [facility] with [facility](M-F 9-5pm)   Watch for 6 min walk.      Patient/Family in Agreement with Plan yes    Plan Comments CM met with patient at bedside, from routine home with . Independent with ADL's uses a walker, cane, wheelchair, and shower chair. No Home 02. Watch for home 02 6 min walk. PCP is [facility], Pharmacy is [facility]. Able to afford medications, denies any issues with food or utilities. Denies any transportatation issues,  will provide. Current with [facility] Monday thru Friday 9-5pm. Denies any additional HHC or SNF needs. Denies any concerns about return home.                  Continued Care and Services - Admitted Since 1/21/2025    No active coordination exists for this encounter.       Expected Discharge Date and Time       Expected Discharge Date Expected Discharge Time    Jan 23, 2025            Demographic Summary       Row Name 01/22/25 1338       General Information    Admission Type inpatient    Arrived From emergency department    Required Notices Provided Important Message from Medicare    Referral Source admission list    Reason for Consult discharge planning    Preferred Language English       Contact Information    Permission Granted to Share Info With     Contact Information Obtained for                    Functional Status       Row Name 01/22/25 1338       Functional Status    Usual Activity Tolerance excellent    Current Activity Tolerance excellent       Physical Activity    On average, how many days per week do you engage in moderate to strenuous exercise (like a brisk walk)? 0 days    On average, how many minutes do you engage in exercise at this level? 0 min    Number of minutes of exercise per week 0       Assessment of Health Literacy    How often do you have someone help you read hospital materials? Sometimes    How often do  you have problems learning about your medical condition because of difficulty understanding written information? Sometimes    How often do you have a problem understanding what is told to you about your medical condition? Sometimes    How confident are you filling out medical forms by yourself? Somewhat    Health Literacy Moderate       Functional Status, IADL    Medications independent    Meal Preparation independent    Housekeeping independent    Laundry independent    Shopping independent       Mental Status    General Appearance WDL WDL       Mental Status Summary    Recent Changes in Mental Status/Cognitive Functioning no changes                        Patient Forms       Row Name 01/22/25 1309       Patient Forms    Important Message from Medicare (IMM) Delivered  IMM 1/22/2025 per cm    Delivered to Patient    Method of delivery In person                Met with patient in room wearing PPE:     Maintained distance greater than six feet and spent less than 15 minutes in the room.    Susanne Reich RN   Case Management  [phone]

## 2025-01-22 NOTE — PROGRESS NOTES
[facility] MEDICINE SERVICE  DAILY PROGRESS NOTE    NAME: Nighat Montoya  : 1960  MRN: [id number]      LOS: 1 day     PROVIDER OF SERVICE: OCHOA Oro    Chief Complaint: Acute hypoxemic respiratory failure    Subjective:     Interval History:  History taken from: patient chart    Patient seen and evaluated at bedside.  Patient states that she feels better.  No complaints at this time.        Review of Systems:   Please see above, review of systems otherwise negative     Objective:     Vital Signs  Temp:  [97.4 °F (36.3 °C)-98.1 °F (36.7 °C)] 98 °F (36.7 °C)  Heart Rate:  [] 93  Resp:  [14-25] 14  BP: (101-131)/(42-77) 107/77  Flow (L/min) (Oxygen Therapy):  [1-3] 3   Body mass index is 26.43 kg/m².    Physical Exam  Physical Exam  HENT:      Head: Normocephalic.      Nose: Nose normal.   Eyes:      General: No scleral icterus.     Extraocular Movements: Extraocular movements intact.      Pupils: Pupils are equal, round, and reactive to light.   Cardiovascular:      Rate and Rhythm: Normal rate and regular rhythm.   Pulmonary:      Comments: Respiratory effort  Abdominal:      General: Bowel sounds are normal.      Tenderness: There is no abdominal tenderness.   Musculoskeletal:      Cervical back: Normal range of motion. No muscular tenderness.      Comments: Moving all extremities   Lymphadenopathy:      Cervical: No cervical adenopathy.   Skin:     General: Skin is warm.   Neurological:      Mental Status: She is alert and oriented to person, place, and time.   Psychiatric:         Attention and Perception: Attention normal.         Mood and Affect: Mood normal.        Diagnostic Data    Results from last 7 days   Lab Units 25  0153   WBC 10*3/mm3 11.54*   HEMOGLOBIN g/dL 11.8*   HEMATOCRIT % 38.3   PLATELETS 10*3/mm3 395   GLUCOSE mg/dL 158*   CREATININE mg/dL 0.61   BUN mg/dL 4*   SODIUM mmol/L 138   POTASSIUM mmol/L 4.2   AST (SGOT) U/L 36*   ALT (SGPT) U/L 21   ALK PHOS  U/L 89   BILIRUBIN mg/dL 0.2   ANION GAP mmol/L 8.5       CT Chest Without Contrast Diagnostic    Result Date: 1/22/2025  Impression: 1. Peripheral peribronchial  nodular densities are demonstrated within the bilateral lower lobes and right middle lobe consistent with infectious/inflammatory process 2. Posterior bilateral lower lobe atelectasis. 3. Trace bilateral pleural effusions. 4. Stable mild cardiomegaly with dense coronary calcifications and mitral valve replacement. Correlate with cardiac history. 4. Additional chronic findings as described above. Electronically Signed: Lisa Adams MD  1/22/2025 9:49 AM EST  Workstation ID: [id number]    XR Chest PA & Lateral    Result Date: 1/21/2025  Impression: Chronic findings without convincing active pulmonary process. Electronically Signed: Ayush Keita MD  1/21/2025 3:33 PM EST  Workstation ID: [id number]       I reviewed the patient's new clinical results.  I reviewed the patient's new imaging results and agree with the interpretation.    Assessment/Plan:     Active and Resolved Problems  Active Hospital Problems    Diagnosis  POA    **Acute hypoxemic respiratory failure [J96.01]  Yes      Resolved Hospital Problems   No resolved problems to display.       Acute hypoxemic respiratory failure due to COPD exacerbation  Tobacco dependence  -not on home oxygen  -COVID-19, influenza negative.  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 George Aragon cardiology     History of atrial fibrillation:  -On metoprolol and Coumadin     NICM  History of LBBB  Chronic systolic heart failure   BiV ICD Medtronic (11/27/15)  -appears  euvolemic.     History of cerebellar CVA:  -Continue home medication     Diabetes mellitus:  -hold metformin during the hospitalization  -Continue ISS     Rheumatoid arthritis:  -Pain controlled  -Immunocompromised due to sulfasalazine use  -On Norco at home     Chronic constipation:  -Continue stool softeners and Movantik     Hypothyroidism:  -Continue Synthroid     Hyperlipidemia:  -Continue statin     VTE prophylaxis: coumadin, SCD  CODE status: Full  Disposition: Home with . CPAP at night       Patient seen and examined at bedside.  Patient continues to require 3 L of oxygen nasal cannula.  Patient is typically on room air at home.  Will obtain walk test prior to discharge.  Patient's INR today is 6.07, although improving, is supratherapeutic.  We will continue to monitor As patient's goal is 2.5-3.5.    VTE Prophylaxis:  Pharmacologic & mechanical VTE prophylaxis orders are present.             Disposition Planning:     Barriers to Discharge: Oxygenation requirement, INR  Anticipated Date of Discharge: 1/24/2025  Place of Discharge: Home      Time: 31 minutes     Code Status and Medical Interventions: CPR (Attempt to Resuscitate); Full Support   Ordered at: 01/21/25 6135     Level Of Support Discussed With:    Patient     Code Status (Patient has no pulse and is not breathing):    CPR (Attempt to Resuscitate)     Medical Interventions (Patient has pulse or is breathing):    Full Support       Signature: Electronically signed by OCHOA Oro, 01/22/25, 13:26 EST.  [facility] Hospitalist Team

## 2025-01-22 NOTE — H&P
[facility] Medicine Services  History & Physical    Patient Name: Nighat Montoya  : 1960  MRN: [id number]  Primary Care Physician:  Estefani Ashton APRN  Date of admission: 2025  Date and Time of Service: 2025 at 1025PM    Subjective      Chief Complaint: SOA     History of Present Illness:     The patient is a 65-year-old female with history of COPD, tobacco dependence, DAVIDE on CPAP, mechanical mitral valve replacement on Coumadin, NICM s/p BiV ICD, rheumatoid arthritis, COPD, cerebellar CVA, diabetes mellitus, chronic constipation and chronic back pain on Norco.    The patient has been having productive yellow cough and shortness of air for about a week thus went to the ED.    Vitals were notable for 88% on room air and was placed on supplemental oxygen.    INR 7.1 and COVID-19 and influenza test are negative.    Review of Systems   All other systems reviewed and are negative.      Personal History     Past Medical History:   Diagnosis Date    Arthritis     Cardiomyopathy     CHF (congestive heart failure)     COPD (chronic obstructive pulmonary disease)     Endometriosis     Hypertension     Mitral valve stenosis     Rheumatic fever     Thyroid disorder 2019       Past Surgical History:   Procedure Laterality Date    ABDOMINAL SURGERY      CARDIAC CATHETERIZATION Left 2017    Procedure: Cardiac Catheterization/Vascular Study;  Surgeon: Pino Umaña MD;  Location: [facility] INVASIVE LOCATION;  Service:     CARDIAC SURGERY       SECTION      CHOLECYSTECTOMY      D & C HYSTEROSCOPY WITH NOVASURE ENDOMETRIAL ABLATION AND MYOSURE      MITRAL VALVE REPLACEMENT      OTHER SURGICAL HISTORY      CARDIAC CATH PROCEDURE OUTCOME: SUCCESSFUL    TONSILLECTOMY      TUBAL ABDOMINAL LIGATION      VASCULAR SURGERY         Family History: family history includes Lung cancer in her brother and father. Otherwise pertinent FHx was reviewed and not pertinent to current  issue.    Social History:  reports that she has been smoking cigarettes. She started smoking about 24 years ago. She has a 7.5 pack-year smoking history. She has never used smokeless tobacco. She reports that she does not drink alcohol and does not use drugs.    Smokes up to half pack daily.  Denies alcohol recreational drug use.  Independent of ADLs.  Uses wheelchair sometimes for longer distances.    Home Medications:  Prior to Admission Medications       Prescriptions Last Dose Informant Patient Reported? Taking?    albuterol (PROVENTIL) (2.5 MG/3ML) 0.083% nebulizer solution 1/21/2025  Yes Yes    Take 2.5 mg by nebulization Every 4 (Four) Hours As Needed for Wheezing.    furosemide (LASIX) 20 MG tablet 1/21/2025  Yes Yes    Take 1 tablet by mouth Daily.    HYDROcodone-acetaminophen (NORCO) 7.5-325 MG per tablet 1/21/2025  Yes Yes    Take 1 tablet by mouth Every 8 (Eight) Hours As Needed for Moderate Pain.    omeprazole (priLOSEC) 40 MG capsule 1/21/2025  Yes Yes    Take 1 capsule by mouth.    potassium chloride (K-DUR) 10 MEQ CR tablet 1/21/2025  Yes Yes    Take 1 tablet by mouth.    zolpidem (AMBIEN) 10 MG tablet 1/21/2025  Yes Yes    Take 1 tablet by mouth Daily.    aspirin 81 MG tablet   Yes No    Take 1 tablet by mouth daily.    atorvastatin (LIPITOR) 40 MG tablet 1/21/2025  Yes Yes    benzonatate (TESSALON) 100 MG capsule   Yes No    Take 100 mg by mouth 2 (Two) Times a Day.    Diclofenac Sodium (VOLTAREN) 1 % gel gel Unknown  Yes No    Apply 4 g topically to the appropriate area as directed 4 (Four) Times a Day As Needed (pain).    dicyclomine (BENTYL) 20 MG tablet Unknown  Yes No    Take 1 tablet by mouth Every 6 (Six) Hours.    ferrous sulfate 325 (65 FE) MG tablet   Yes No    Take  by mouth.    Fluticasone Furoate-Vilanterol 100-25 MCG/INH aerosol powder  1/21/2025  Yes Yes    Inhale 1 puff Daily.    levothyroxine (SYNTHROID, LEVOTHROID) 50 MCG tablet 1/21/2025  Yes Yes    linaclotide (LINZESS) 290 MCG  capsule capsule 1/21/2025  Yes Yes    Take 77 mcg by mouth Every Morning Before Breakfast.    lisinopril (PRINIVIL,ZESTRIL) 5 MG tablet   Yes No    Take 5 mg by mouth.    meclizine (ANTIVERT) 12.5 MG tablet Unknown  Yes No    Take 1 tablet by mouth Daily.    metFORMIN (GLUCOPHAGE) 500 MG tablet 1/21/2025  Yes Yes    Take 1 tablet by mouth 2 (Two) Times a Day With Meals.    methocarbamol (ROBAXIN) 500 MG tablet Unknown  Yes No    Methylnaltrexone Bromide (RELISTOR PO)   Yes No    Take 450 mg by mouth Daily.    metoprolol tartrate (LOPRESSOR) 25 MG tablet 1/21/2025  Yes Yes    Take 1 tablet by mouth.    MOTEGRITY 2 MG tablet   Yes No    ondansetron ODT (ZOFRAN-ODT) 4 MG disintegrating tablet Unknown  Yes No    PARoxetine (PAXIL) 30 MG tablet   Yes No    SPIRIVA RESPIMAT 2.5 MCG/ACT aerosol solution inhaler 1/21/2025  Yes Yes    sulfaSALAzine (AZULFIDINE) 500 MG tablet 1/21/2025  No Yes    TAKE 1 TABLET BY MOUTH TWICE DAILY    Vitamin D, Cholecalciferol, (CHOLECALCIFEROL) 10 MCG (400 UNIT) tablet   Yes No    Take 1 tablet by mouth 4 (Four) Times a Day.    warfarin (COUMADIN) 2.5 MG tablet   Yes No    Take 3.5 mg by mouth Every Night.              Allergies:  Allergies   Allergen Reactions    Metronidazole Rash    Budesonide Rash    Clindamycin Rash       Objective      Vitals:   Temp:  [97.6 °F (36.4 °C)-98.1 °F (36.7 °C)] 97.6 °F (36.4 °C)  Heart Rate:  [] 82  Resp:  [15-25] 24  BP: (102-131)/(43-73) 124/53  Body mass index is 26.43 kg/m².  Physical Exam  HENT:      Head: Normocephalic.      Nose: Nose normal.   Eyes:      General: No scleral icterus.     Extraocular Movements: Extraocular movements intact.      Pupils: Pupils are equal, round, and reactive to light.   Cardiovascular:      Rate and Rhythm: Normal rate and regular rhythm.   Pulmonary:      Comments: Respiratory effort  Abdominal:      General: Bowel sounds are normal.      Tenderness: There is no abdominal tenderness.   Musculoskeletal:       Cervical back: Normal range of motion. No muscular tenderness.      Comments: Moving all extremities   Lymphadenopathy:      Cervical: No cervical adenopathy.   Skin:     General: Skin is warm.   Neurological:      Mental Status: She is alert and oriented to person, place, and time.   Psychiatric:         Attention and Perception: Attention normal.         Mood and Affect: Mood normal.         Diagnostic Data:  Lab Results (last 24 hours)       Procedure Component Value Units Date/Time    RSV PCR - Swab, Nasopharynx [[id number]] Collected: 01/21/25 2324    Specimen: Swab from Nasopharynx Updated: 01/21/25 2334    STAT Lactic Acid, Reflex [[id number]]  (Normal) Collected: 01/21/25 1841    Specimen: Blood Updated: 01/21/25 1859     Lactate 1.1 mmol/L     Urinalysis without microscopic (no culture) - Urine, Clean Catch [[id number]]  (Abnormal) Collected: 01/21/25 1753    Specimen: Urine, Clean Catch Updated: 01/21/25 1801     Color, UA Yellow     Appearance, UA Clear     pH, UA <=5.0     Specific Gravity, UA 1.010     Glucose, UA Negative     Ketones, UA Negative     Bilirubin, UA Negative     Blood, UA Trace     Protein, UA Negative     Leuk Esterase, UA Negative     Nitrite, UA Negative     Urobilinogen, UA 0.2 E.U./dL    High Sensitivity Troponin T 1Hr [[id number]]  (Normal) Collected: 01/21/25 1614    Specimen: Blood Updated: 01/21/25 1635     HS Troponin T 9 ng/L      Troponin T Numeric Delta -1 ng/L     Narrative:      High Sensitive Troponin T Reference Range:  <14.0 ng/L- Negative Female for AMI  <22.0 ng/L- Negative Male for AMI  >=14 - Abnormal Female indicating possible myocardial injury.  >=22 - Abnormal Male indicating possible myocardial injury.   Clinicians would have to utilize clinical acumen, EKG, Troponin, and serial changes to determine if it is an Acute Myocardial Infarction or myocardial injury due to an underlying chronic condition.         High Sensitivity Troponin T [[id number]]  (Normal) Collected:  01/21/25 1512    Specimen: Blood Updated: 01/21/25 1542     HS Troponin T 10 ng/L     Narrative:      High Sensitive Troponin T Reference Range:  <14.0 ng/L- Negative Female for AMI  <22.0 ng/L- Negative Male for AMI  >=14 - Abnormal Female indicating possible myocardial injury.  >=22 - Abnormal Male indicating possible myocardial injury.   Clinicians would have to utilize clinical acumen, EKG, Troponin, and serial changes to determine if it is an Acute Myocardial Infarction or myocardial injury due to an underlying chronic condition.         Comprehensive Metabolic Panel [[id number]]  (Abnormal) Collected: 01/21/25 1512    Specimen: Blood Updated: 01/21/25 1542     Glucose 241 mg/dL      BUN 6 mg/dL      Creatinine 0.68 mg/dL      Sodium 139 mmol/L      Potassium 4.1 mmol/L      Chloride 104 mmol/L      CO2 27.8 mmol/L      Calcium 9.1 mg/dL      Total Protein 6.8 g/dL      Albumin 3.3 g/dL      ALT (SGPT) 12 U/L      AST (SGOT) 17 U/L      Alkaline Phosphatase 88 U/L      Total Bilirubin 0.2 mg/dL      Globulin 3.5 gm/dL      A/G Ratio 0.9 g/dL      BUN/Creatinine Ratio 8.8     Anion Gap 7.2 mmol/L      eGFR 98.6 mL/min/1.73     Narrative:      GFR Categories in Chronic Kidney Disease (CKD)      GFR Category          GFR (mL/min/1.73)    Interpretation  G1                     90 or greater         Normal or high (1)  G2                      60-89                Mild decrease (1)  G3a                   45-59                Mild to moderate decrease  G3b                   30-44                Moderate to severe decrease  G4                    15-29                Severe decrease  G5                    14 or less           Kidney failure          (1)In the absence of evidence of kidney disease, neither GFR category G1 or G2 fulfill the criteria for CKD.    eGFR calculation 2021 CKD-EPI creatinine equation, which does not include race as a factor    Lactic Acid, Plasma [[id number]]  (Abnormal) Collected: 01/21/25 1512  "   Specimen: Blood Updated: 01/21/25 1538     Lactate 2.2 mmol/L     D-dimer, Quantitative [[id number]]  (Normal) Collected: 01/21/25 1513    Specimen: Blood Updated: 01/21/25 1531     D-Dimer, Quantitative 0.26 MCGFEU/mL     Narrative:      According to the assay 's published package insert, a normal (<0.50 MCGFEU/mL) D-dimer result in conjunction with a non-high clinical probability assessment, excludes deep vein thrombosis (DVT) and pulmonary embolism (PE) with high sensitivity.    D-dimer values increase with age and this can make VTE exclusion of an older population difficult. To address this, the [employer], based on best available evidence and recent guidelines, recommends that clinicians use age-adjusted D-dimer thresholds in patients greater than 50 years of age with: a) a low probability of PE who do not meet all Pulmonary Embolism Rule Out Criteria, or b) in those with intermediate probability of PE.   The formula for an age-adjusted D-dimer cut-off is \"age/100\".  For example, a 60 year old patient would have an age-adjusted cut-off of 0.60 MCGFEU/mL and an 80 year old 0.80 MCGFEU/mL.    CBC & Differential [[id number]]  (Abnormal) Collected: 01/21/25 1512    Specimen: Blood Updated: 01/21/25 1524    Narrative:      The following orders were created for panel order CBC & Differential.  Procedure                               Abnormality         Status                     ---------                               -----------         ------                     CBC Auto Differential[[id number]]        Abnormal            Final result                 Please view results for these tests on the individual orders.    CBC Auto Differential [[id number]]  (Abnormal) Collected: 01/21/25 1512    Specimen: Blood Updated: 01/21/25 1524     WBC 10.94 10*3/mm3      RBC 4.53 10*6/mm3      Hemoglobin 12.4 g/dL      Hematocrit 41.3 %      MCV 91.2 fL      MCH 27.4 pg      MCHC 30.0 g/dL      RDW " 15.1 %      RDW-SD 51.1 fl      MPV 9.8 fL      Platelets 444 10*3/mm3      Neutrophil % 79.0 %      Lymphocyte % 13.1 %      Monocyte % 5.9 %      Eosinophil % 0.8 %      Basophil % 0.7 %      Immature Grans % 0.5 %      Neutrophils, Absolute 8.63 10*3/mm3      Lymphocytes, Absolute 1.43 10*3/mm3      Monocytes, Absolute 0.65 10*3/mm3      Eosinophils, Absolute 0.09 10*3/mm3      Basophils, Absolute 0.08 10*3/mm3      Immature Grans, Absolute 0.06 10*3/mm3     POC Protime / INR [[id number]]  (Abnormal) Collected: 01/21/25 1505    Specimen: Blood from Hand, Left Updated: 01/21/25 1506     Protime 65.1 seconds      INR 7.1    COVID-19 and FLU A/B PCR, 1 HR TAT - Swab, Nasopharynx [[id number]]  (Normal) Collected: 01/21/25 1441    Specimen: Swab from Nasopharynx Updated: 01/21/25 1502     COVID19 Not Detected     Influenza A PCR Not Detected     Influenza B PCR Not Detected    Narrative:      Fact sheet for providers: https://www.fda.gov/media/006789/download    Fact sheet for patients: https://www.fda.gov/media/636124/download    Test performed by PCR.             Imaging Results (Last 24 Hours)       Procedure Component Value Units Date/Time    XR Chest PA & Lateral [[id number]] Collected: 01/21/25 1531     Updated: 01/21/25 1535    Narrative:      XR CHEST PA AND LATERAL    Date of Exam: 1/21/2025 3:16 PM EST    Indication: cough, shortness of breath    Comparison: Chest CT 8/29/2024    Findings:  Cardiomegaly. Multichamber AICD. Mitral valve repair. Chronic appearing interstitial changes without lobar consolidation, edema, effusion or pneumothorax. Degenerative related osseous findings. Exaggerated thoracic kyphosis. Multiple chronic appearing   thoracic compression deformities similar to previous CT comparison.      Impression:      Impression:  Chronic findings without convincing active pulmonary process.      Electronically Signed: Ayush Keita MD    1/21/2025 3:33 PM EST    Workstation ID: [id number]               Assessment & Plan        This is a 64 y.o. female with COPD, tobacco dependence and presentation to the ED with acute hypoxemic respiratory failure due to COPD exacerbation.    Active and Resolved Problems  Active Hospital Problems    Diagnosis  POA    **Acute hypoxemic respiratory failure [J96.01]  Yes      Resolved Hospital Problems   No resolved problems to display.     Acute hypoxemic respiratory failure due to COPD exacerbation  Tobacco dependence  -not on home oxygen  -COVID-19, influenza negative.  Ordered RSV screen  -Continue Solu-Medrol, bronchodilators, antibiotics, Mucinex  -Flutter device  -Nicotine patch  -Counseled on tobacco cessation    Supratherapeutic INR  -INR 7.1  -Consult pharmacy for warfarin dosing    VHD history of MS s/p mechanical MVR (9/4/14)  History of TR s/p tricuspid valve repair with ring (9/4/14)  Pulmonary hypertension  -Goal INR 2.5-3.5  -Sees Dr. George Aragon cardiology    History of atrial fibrillation:  -On metoprolol and Coumadin    NICM  History of LBBB  Chronic systolic heart failure   BiV ICD Medtronic (11/27/15)  -appears euvolemic.    History of cerebellar CVA:  -Continue home medication    Diabetes mellitus:  -hold metformin during the hospitalization  -Continue ISS    Rheumatoid arthritis:  -Pain controlled  -Immunocompromised due to sulfasalazine use  -On Norco at home    Chronic constipation:  -Continue stool softeners and Movantik    Hypothyroidism:  -Continue Synthroid    Hyperlipidemia:  -Continue statin    VTE prophylaxis: coumadin, SCD  CODE status: Full  Disposition: Home with . CPAP at night      VTE Prophylaxis:  Mechanical VTE prophylaxis orders are present.      CODE STATUS:    Level Of Support Discussed With: Patient  Code Status (Patient has no pulse and is not breathing): CPR (Attempt to Resuscitate)  Medical Interventions (Patient has pulse or is breathing): Full Support      I discussed the patient's findings and my recommendations  with patient and family.      Signature:     This document has been electronically signed by Humphrey Abel DO on January 21, 2025 23:49 EST   [facility] Hospitalist Team

## 2025-01-22 NOTE — PROGRESS NOTES
"Pharmacy dosing service  Anticoagulant  Warfarin     Subjective:    Nighat Montoya is a 64 y.o.female being continued on warfarin for Atrial Fibrillation / Flutter and Mechanical Mitral Valve.    INR Goal: 2.5 - 3.5  Home medication?: Alternating days of 4 mg and 3.5 mg of warfarin.  Bridge Therapy Present?:  No  Interacting Medications Evaluation (New/Present/Discontinued):   Doxycycline (1/22-1/26): may enhance the anticoagulant effect of warfarin  Furosemide (pta): may diminish the anticoagulant effect of warfarin  Methylprednisolone (1/22 - ): may enhance the anticoagulant effect of warfarin  Pantoprazole (pta omeprazole): may enhance the anticoagulant effect of warfarin  Sulfasalazine (pta): may enhance the anticoagulant effect of warfarin  Additional Contributing Factors:   No documented meal intake in last 24 hours.       Assessment/Plan:    Patient's INR is SUPRAtherapeutic today at 6.07, a decrease from 7.1 yesterday.   Will give a reduced dose of warfarin 2 mg this evening as it is anticipated that the INR will continue to drop.  Daily PT/INR ordered.       Continue to monitor and adjust based on INR.         Date 1/22 1/23          INR 7.1 6.07          Dose Hold 2 mg            Objective:  [Ht: 154.9 cm (61\"); Wt: 63.5 kg (139 lb 14.4 oz); BMI: Body mass index is 26.43 kg/m².]    Lab Results   Component Value Date    ALBUMIN 3.1 (L) 01/22/2025     Lab Results   Component Value Date    INR 6.07 (C) 01/22/2025    INR 7.1 (H) 01/21/2025    INR 3.92 (H) 11/12/2023    PROTIME 53.4 (H) 01/22/2025    PROTIME 65.1 01/21/2025    PROTIME 38.6 (H) 11/12/2023     Lab Results   Component Value Date    HGB 11.8 (L) 01/22/2025    HGB 12.4 01/21/2025    HGB 15.9 11/12/2023     Lab Results   Component Value Date    HCT 38.3 01/22/2025    HCT 41.3 01/21/2025    HCT 49.0 (H) 11/12/2023     Vineet Mckinney [facility]  01/22/25 15:22 EST   "

## 2025-01-23 LAB
ALBUMIN SERPL-MCNC: 3.2 G/DL (ref 3.5–5.2)
ALBUMIN/GLOB SERPL: 0.9 G/DL
ALP SERPL-CCNC: 79 U/L (ref 39–117)
ALT SERPL W P-5'-P-CCNC: 19 U/L (ref 1–33)
ANION GAP SERPL CALCULATED.3IONS-SCNC: 7.8 MMOL/L (ref 5–15)
AST SERPL-CCNC: 27 U/L (ref 1–32)
BASOPHILS # BLD AUTO: 0.04 10*3/MM3 (ref 0–0.2)
BASOPHILS NFR BLD AUTO: 0.2 % (ref 0–1.5)
BILIRUB SERPL-MCNC: <0.2 MG/DL (ref 0–1.2)
BUN SERPL-MCNC: 9 MG/DL (ref 8–23)
BUN/CREAT SERPL: 16.1 (ref 7–25)
CALCIUM SPEC-SCNC: 8.9 MG/DL (ref 8.6–10.5)
CHLORIDE SERPL-SCNC: 103 MMOL/L (ref 98–107)
CO2 SERPL-SCNC: 27.2 MMOL/L (ref 22–29)
CREAT SERPL-MCNC: 0.56 MG/DL (ref 0.57–1)
DEPRECATED RDW RBC AUTO: 50.2 FL (ref 37–54)
EGFRCR SERPLBLD CKD-EPI 2021: 103.3 ML/MIN/1.73
EOSINOPHIL # BLD AUTO: 0 10*3/MM3 (ref 0–0.4)
EOSINOPHIL NFR BLD AUTO: 0 % (ref 0.3–6.2)
ERYTHROCYTE [DISTWIDTH] IN BLOOD BY AUTOMATED COUNT: 15.2 % (ref 12.3–15.4)
GLOBULIN UR ELPH-MCNC: 3.4 GM/DL
GLUCOSE BLDC GLUCOMTR-MCNC: 137 MG/DL (ref 70–105)
GLUCOSE BLDC GLUCOMTR-MCNC: 172 MG/DL (ref 70–105)
GLUCOSE BLDC GLUCOMTR-MCNC: 183 MG/DL (ref 70–105)
GLUCOSE BLDC GLUCOMTR-MCNC: 234 MG/DL (ref 70–105)
GLUCOSE SERPL-MCNC: 180 MG/DL (ref 65–99)
HCT VFR BLD AUTO: 38.6 % (ref 34–46.6)
HGB BLD-MCNC: 11.7 G/DL (ref 12–15.9)
IMM GRANULOCYTES # BLD AUTO: 0.16 10*3/MM3 (ref 0–0.05)
IMM GRANULOCYTES NFR BLD AUTO: 0.7 % (ref 0–0.5)
INR PPP: 4.63 (ref 2–3)
INR PPP: 5.07 (ref 2–3)
LYMPHOCYTES # BLD AUTO: 1.48 10*3/MM3 (ref 0.7–3.1)
LYMPHOCYTES NFR BLD AUTO: 6.7 % (ref 19.6–45.3)
MAGNESIUM SERPL-MCNC: 2.2 MG/DL (ref 1.6–2.4)
MCH RBC QN AUTO: 27.5 PG (ref 26.6–33)
MCHC RBC AUTO-ENTMCNC: 30.3 G/DL (ref 31.5–35.7)
MCV RBC AUTO: 90.6 FL (ref 79–97)
MONOCYTES # BLD AUTO: 0.79 10*3/MM3 (ref 0.1–0.9)
MONOCYTES NFR BLD AUTO: 3.6 % (ref 5–12)
NEUTROPHILS NFR BLD AUTO: 19.73 10*3/MM3 (ref 1.7–7)
NEUTROPHILS NFR BLD AUTO: 88.8 % (ref 42.7–76)
NRBC BLD AUTO-RTO: 0 /100 WBC (ref 0–0.2)
PLATELET # BLD AUTO: 474 10*3/MM3 (ref 140–450)
PMV BLD AUTO: 9.4 FL (ref 6–12)
POTASSIUM SERPL-SCNC: 5.1 MMOL/L (ref 3.5–5.2)
PROT SERPL-MCNC: 6.6 G/DL (ref 6–8.5)
PROTHROMBIN TIME: 43.3 SECONDS (ref 19.4–28.5)
PROTHROMBIN TIME: 46.4 SECONDS (ref 19.4–28.5)
RBC # BLD AUTO: 4.26 10*6/MM3 (ref 3.77–5.28)
SODIUM SERPL-SCNC: 138 MMOL/L (ref 136–145)
WBC NRBC COR # BLD AUTO: 22.2 10*3/MM3 (ref 3.4–10.8)

## 2025-01-23 PROCEDURE — 63710000001 INSULIN LISPRO (HUMAN) PER 5 UNITS: Performed by: NURSE PRACTITIONER

## 2025-01-23 PROCEDURE — 94664 DEMO&/EVAL PT USE INHALER: CPT

## 2025-01-23 PROCEDURE — 94799 UNLISTED PULMONARY SVC/PX: CPT

## 2025-01-23 PROCEDURE — 25010000002 METHYLPREDNISOLONE PER 40 MG: Performed by: HOSPITALIST

## 2025-01-23 PROCEDURE — 63710000001 INSULIN LISPRO (HUMAN) PER 5 UNITS: Performed by: HOSPITALIST

## 2025-01-23 PROCEDURE — 85610 PROTHROMBIN TIME: CPT | Performed by: HOSPITALIST

## 2025-01-23 PROCEDURE — 82948 REAGENT STRIP/BLOOD GLUCOSE: CPT | Performed by: HOSPITALIST

## 2025-01-23 PROCEDURE — 80053 COMPREHEN METABOLIC PANEL: CPT | Performed by: FAMILY MEDICINE

## 2025-01-23 PROCEDURE — 83735 ASSAY OF MAGNESIUM: CPT | Performed by: FAMILY MEDICINE

## 2025-01-23 PROCEDURE — 99222 1ST HOSP IP/OBS MODERATE 55: CPT | Performed by: INTERNAL MEDICINE

## 2025-01-23 PROCEDURE — 82948 REAGENT STRIP/BLOOD GLUCOSE: CPT

## 2025-01-23 PROCEDURE — 85025 COMPLETE CBC W/AUTO DIFF WBC: CPT | Performed by: FAMILY MEDICINE

## 2025-01-23 PROCEDURE — 25010000002 METHYLPREDNISOLONE PER 40 MG: Performed by: FAMILY MEDICINE

## 2025-01-23 PROCEDURE — 25010000002 METHYLPREDNISOLONE PER 40 MG: Performed by: NURSE PRACTITIONER

## 2025-01-23 PROCEDURE — 85610 PROTHROMBIN TIME: CPT | Performed by: FAMILY MEDICINE

## 2025-01-23 PROCEDURE — 94761 N-INVAS EAR/PLS OXIMETRY MLT: CPT

## 2025-01-23 RX ORDER — METHYLPREDNISOLONE SODIUM SUCCINATE 40 MG/ML
20 INJECTION, POWDER, LYOPHILIZED, FOR SOLUTION INTRAMUSCULAR; INTRAVENOUS EVERY 12 HOURS
Status: DISCONTINUED | OUTPATIENT
Start: 2025-01-23 | End: 2025-01-24 | Stop reason: HOSPADM

## 2025-01-23 RX ORDER — WARFARIN SODIUM 4 MG/1
4 TABLET ORAL
Status: DISCONTINUED | OUTPATIENT
Start: 2025-01-23 | End: 2025-01-23

## 2025-01-23 RX ORDER — METHYLPREDNISOLONE SODIUM SUCCINATE 40 MG/ML
20 INJECTION, POWDER, LYOPHILIZED, FOR SOLUTION INTRAMUSCULAR; INTRAVENOUS EVERY 8 HOURS
Status: DISCONTINUED | OUTPATIENT
Start: 2025-01-23 | End: 2025-01-23

## 2025-01-23 RX ORDER — INSULIN LISPRO 100 [IU]/ML
2-9 INJECTION, SOLUTION INTRAVENOUS; SUBCUTANEOUS
Status: DISCONTINUED | OUTPATIENT
Start: 2025-01-23 | End: 2025-01-24 | Stop reason: HOSPADM

## 2025-01-23 RX ORDER — WARFARIN SODIUM 4 MG/1
4 TABLET ORAL
Status: COMPLETED | OUTPATIENT
Start: 2025-01-23 | End: 2025-01-23

## 2025-01-23 RX ADMIN — INSULIN LISPRO 2 UNITS: 100 INJECTION, SOLUTION INTRAVENOUS; SUBCUTANEOUS at 08:45

## 2025-01-23 RX ADMIN — LUBIPROSTONE 8 MCG: 8 CAPSULE, GELATIN COATED ORAL at 08:45

## 2025-01-23 RX ADMIN — SULFASALAZINE 500 MG: 500 TABLET ORAL at 21:16

## 2025-01-23 RX ADMIN — ATORVASTATIN CALCIUM 20 MG: 20 TABLET, FILM COATED ORAL at 08:45

## 2025-01-23 RX ADMIN — LUBIPROSTONE 8 MCG: 8 CAPSULE, GELATIN COATED ORAL at 21:16

## 2025-01-23 RX ADMIN — IPRATROPIUM BROMIDE AND ALBUTEROL SULFATE 3 ML: .5; 3 SOLUTION RESPIRATORY (INHALATION) at 06:42

## 2025-01-23 RX ADMIN — ZOLPIDEM TARTRATE 5 MG: 5 TABLET, FILM COATED ORAL at 23:26

## 2025-01-23 RX ADMIN — SULFASALAZINE 500 MG: 500 TABLET ORAL at 08:45

## 2025-01-23 RX ADMIN — FUROSEMIDE 20 MG: 20 TABLET ORAL at 08:45

## 2025-01-23 RX ADMIN — PANTOPRAZOLE SODIUM 40 MG: 40 TABLET, DELAYED RELEASE ORAL at 05:39

## 2025-01-23 RX ADMIN — ZOLPIDEM TARTRATE 5 MG: 5 TABLET, FILM COATED ORAL at 23:27

## 2025-01-23 RX ADMIN — INSULIN LISPRO 2 UNITS: 100 INJECTION, SOLUTION INTRAVENOUS; SUBCUTANEOUS at 17:53

## 2025-01-23 RX ADMIN — METOPROLOL TARTRATE 25 MG: 25 TABLET, FILM COATED ORAL at 08:45

## 2025-01-23 RX ADMIN — METHYLPREDNISOLONE SODIUM SUCCINATE 20 MG: 40 INJECTION, POWDER, FOR SOLUTION INTRAMUSCULAR; INTRAVENOUS at 21:16

## 2025-01-23 RX ADMIN — DOXYCYCLINE 100 MG: 100 CAPSULE ORAL at 12:41

## 2025-01-23 RX ADMIN — HYDROCODONE BITARTRATE AND ACETAMINOPHEN 1 TABLET: 7.5; 325 TABLET ORAL at 21:16

## 2025-01-23 RX ADMIN — METOPROLOL TARTRATE 25 MG: 25 TABLET, FILM COATED ORAL at 21:16

## 2025-01-23 RX ADMIN — ZOLPIDEM TARTRATE 5 MG: 5 TABLET, FILM COATED ORAL at 01:19

## 2025-01-23 RX ADMIN — ZOLPIDEM TARTRATE 5 MG: 5 TABLET, FILM COATED ORAL at 01:18

## 2025-01-23 RX ADMIN — Medication 10 ML: at 08:45

## 2025-01-23 RX ADMIN — WARFARIN SODIUM 4 MG: 4 TABLET ORAL at 19:23

## 2025-01-23 RX ADMIN — Medication 10 ML: at 21:17

## 2025-01-23 RX ADMIN — METHYLPREDNISOLONE SODIUM SUCCINATE 20 MG: 40 INJECTION, POWDER, FOR SOLUTION INTRAMUSCULAR; INTRAVENOUS at 08:45

## 2025-01-23 RX ADMIN — DOXYCYCLINE 100 MG: 100 CAPSULE ORAL at 01:10

## 2025-01-23 RX ADMIN — IPRATROPIUM BROMIDE AND ALBUTEROL SULFATE 3 ML: .5; 3 SOLUTION RESPIRATORY (INHALATION) at 14:49

## 2025-01-23 RX ADMIN — IPRATROPIUM BROMIDE AND ALBUTEROL SULFATE 3 ML: .5; 3 SOLUTION RESPIRATORY (INHALATION) at 11:35

## 2025-01-23 RX ADMIN — METHYLPREDNISOLONE SODIUM SUCCINATE 40 MG: 40 INJECTION, POWDER, FOR SOLUTION INTRAMUSCULAR; INTRAVENOUS at 01:10

## 2025-01-23 RX ADMIN — IPRATROPIUM BROMIDE AND ALBUTEROL SULFATE 3 ML: .5; 3 SOLUTION RESPIRATORY (INHALATION) at 18:05

## 2025-01-23 RX ADMIN — INSULIN LISPRO 3 UNITS: 100 INJECTION, SOLUTION INTRAVENOUS; SUBCUTANEOUS at 12:41

## 2025-01-23 RX ADMIN — LEVOTHYROXINE SODIUM 50 MCG: 50 TABLET ORAL at 05:39

## 2025-01-23 NOTE — PLAN OF CARE
Problem: Adult Inpatient Plan of Care  Goal: Plan of Care Review  Outcome: Progressing  Goal: Patient-Specific Goal (Individualized)  Outcome: Progressing  Goal: Absence of Hospital-Acquired Illness or Injury  Outcome: Progressing  Intervention: Identify and Manage Fall Risk  Recent Flowsheet Documentation  Taken 1/22/2025 1611 by Clarita Mills LPN  Safety Promotion/Fall Prevention:   activity supervised   assistive device/personal items within reach   clutter free environment maintained   lighting adjusted   room organization consistent   safety round/check completed  Taken 1/22/2025 1440 by Clarita Mills LPN  Safety Promotion/Fall Prevention:   activity supervised   assistive device/personal items within reach   clutter free environment maintained   lighting adjusted   room organization consistent   safety round/check completed  Taken 1/22/2025 1241 by Clarita Mills LPN  Safety Promotion/Fall Prevention:   activity supervised   assistive device/personal items within reach   clutter free environment maintained   lighting adjusted   room organization consistent   safety round/check completed  Taken 1/22/2025 1015 by Clarita Mills LPN  Safety Promotion/Fall Prevention:   activity supervised   assistive device/personal items within reach   clutter free environment maintained   lighting adjusted   room organization consistent   safety round/check completed  Taken 1/22/2025 0842 by Clarita Mills LPN  Safety Promotion/Fall Prevention:   activity supervised   assistive device/personal items within reach   clutter free environment maintained   lighting adjusted   room organization consistent   safety round/check completed  Intervention: Prevent Skin Injury  Recent Flowsheet Documentation  Taken 1/22/2025 1611 by Clarita Mills LPN  Skin Protection: incontinence pads utilized  Taken 1/22/2025 1241 by Clarita Mills LPN  Skin Protection: incontinence pads utilized  Taken 1/22/2025 0842 by Gene  PERRY Otto  Skin Protection: incontinence pads utilized  Intervention: Prevent and Manage VTE (Venous Thromboembolism) Risk  Recent Flowsheet Documentation  Taken 1/22/2025 0842 by Clarita Mills LPN  VTE Prevention/Management:   bilateral   SCDs (sequential compression devices) off   patient refused intervention  Intervention: Prevent Infection  Recent Flowsheet Documentation  Taken 1/22/2025 1611 by Clarita Mills LPN  Infection Prevention:   cohorting utilized   environmental surveillance performed   hand hygiene promoted   personal protective equipment utilized   rest/sleep promoted   single patient room provided   visitors restricted/screened  Taken 1/22/2025 1440 by Clarita Mills LPN  Infection Prevention:   cohorting utilized   environmental surveillance performed   hand hygiene promoted   personal protective equipment utilized   rest/sleep promoted   single patient room provided   visitors restricted/screened  Taken 1/22/2025 1241 by Clarita Mills LPN  Infection Prevention:   cohorting utilized   environmental surveillance performed   hand hygiene promoted   personal protective equipment utilized   rest/sleep promoted   single patient room provided   visitors restricted/screened  Taken 1/22/2025 1015 by Clarita Mills LPN  Infection Prevention:   cohorting utilized   environmental surveillance performed   hand hygiene promoted   personal protective equipment utilized   rest/sleep promoted   single patient room provided   visitors restricted/screened  Taken 1/22/2025 0842 by Clarita Mills LPN  Infection Prevention:   cohorting utilized   environmental surveillance performed   hand hygiene promoted   personal protective equipment utilized   rest/sleep promoted   single patient room provided   visitors restricted/screened  Goal: Optimal Comfort and Wellbeing  Outcome: Progressing  Intervention: Monitor Pain and Promote Comfort  Recent Flowsheet Documentation  Taken 1/22/2025 1611 by Gene  PERRY Otto  Pain Management Interventions:   care clustered   diversional activity provided  Taken 1/22/2025 1241 by Clarita Mills LPN  Pain Management Interventions:   care clustered   diversional activity provided  Taken 1/22/2025 0842 by Clarita Mills LPN  Pain Management Interventions:   care clustered   diversional activity provided  Intervention: Provide Person-Centered Care  Recent Flowsheet Documentation  Taken 1/22/2025 1611 by Clarita Mills LPN  Trust Relationship/Rapport:   care explained   choices provided   thoughts/feelings acknowledged  Taken 1/22/2025 1241 by Clarita Mills LPN  Trust Relationship/Rapport:   care explained   choices provided  Taken 1/22/2025 0842 by Clarita Mills LPN  Trust Relationship/Rapport:   care explained   choices provided   thoughts/feelings acknowledged  Goal: Readiness for Transition of Care  Outcome: Progressing   Goal Outcome Evaluation:       Patients blood pressure has been low this shift with medication given per order. Patient has no c/o pain at this time. Patient has O2 in place per nasal cannula with no sob noted. Call light within reach.

## 2025-01-23 NOTE — PLAN OF CARE
Goal Outcome Evaluation:  Plan of Care Reviewed With: patient     Problem: Adult Inpatient Plan of Care  Goal: Plan of Care Review  Outcome: Progressing  Flowsheets (Taken 1/23/2025 0616)  Progress: improving  Plan of Care Reviewed With: patient        Progress: improving

## 2025-01-23 NOTE — PROGRESS NOTES
[facility] MEDICINE SERVICE  DAILY PROGRESS NOTE    NAME: Nighat Montoya  : 1960  MRN: [id number]      LOS: 2 days     PROVIDER OF SERVICE: OCHOA Oro    Chief Complaint: Acute hypoxemic respiratory failure    Subjective:     Interval History:  History taken from: patient chart    Patient seen and evaluated at bedside.  Patient states that she feels better.  No complaints at this time.        Review of Systems:   Please see above, review of systems otherwise negative     Objective:     Vital Signs  Temp:  [96 °F (35.6 °C)-98 °F (36.7 °C)] 96 °F (35.6 °C)  Heart Rate:  [] 80  Resp:  [14-25] 20  BP: (103-112)/(47-77) 112/47  Flow (L/min) (Oxygen Therapy):  [2-3] 2   Body mass index is 26.43 kg/m².    Physical Exam  Physical Exam  HENT:      Head: Normocephalic.      Nose: Nose normal.   Eyes:      General: No scleral icterus.     Extraocular Movements: Extraocular movements intact.      Pupils: Pupils are equal, round, and reactive to light.   Cardiovascular:      Rate and Rhythm: Normal rate and regular rhythm.   Pulmonary:      Comments: Respiratory effort  Abdominal:      General: Bowel sounds are normal.      Tenderness: There is no abdominal tenderness.   Musculoskeletal:      Cervical back: Normal range of motion. No muscular tenderness.      Comments: Moving all extremities   Lymphadenopathy:      Cervical: No cervical adenopathy.   Skin:     General: Skin is warm.   Neurological:      Mental Status: She is alert and oriented to person, place, and time.   Psychiatric:         Attention and Perception: Attention normal.         Mood and Affect: Mood normal.        Diagnostic Data    Results from last 7 days   Lab Units 25  0922 25  0601   WBC 10*3/mm3 22.20*  --    HEMOGLOBIN g/dL 11.7*  --    HEMATOCRIT % 38.6  --    PLATELETS 10*3/mm3 474*  --    GLUCOSE mg/dL  --  180*   CREATININE mg/dL  --  0.56*   BUN mg/dL  --  9   SODIUM mmol/L  --  138   POTASSIUM mmol/L   --  5.1   AST (SGOT) U/L  --  27   ALT (SGPT) U/L  --  19   ALK PHOS U/L  --  79   BILIRUBIN mg/dL  --  <0.2   ANION GAP mmol/L  --  7.8       CT Chest Without Contrast Diagnostic    Result Date: 1/22/2025  Impression: 1. Peripheral peribronchial  nodular densities are demonstrated within the bilateral lower lobes and right middle lobe consistent with infectious/inflammatory process 2. Posterior bilateral lower lobe atelectasis. 3. Trace bilateral pleural effusions. 4. Stable mild cardiomegaly with dense coronary calcifications and mitral valve replacement. Correlate with cardiac history. 4. Additional chronic findings as described above. Electronically Signed: Lisa Adams MD  1/22/2025 9:49 AM EST  Workstation ID: [id number]    XR Chest PA & Lateral    Result Date: 1/21/2025  Impression: Chronic findings without convincing active pulmonary process. Electronically Signed: Ayush Keita MD  1/21/2025 3:33 PM EST  Workstation ID: [id number]       I reviewed the patient's new clinical results.  I reviewed the patient's new imaging results and agree with the interpretation.    Assessment/Plan:     Active and Resolved Problems  Active Hospital Problems    Diagnosis  POA    **Acute hypoxemic respiratory failure [J96.01]  Yes      Resolved Hospital Problems   No resolved problems to display.       Acute hypoxemic respiratory failure due to COPD exacerbation  Tobacco dependence  -not on home oxygen  -COVID-19, influenza negative.  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 George Aragon cardiology     History of atrial fibrillation:  -On metoprolol and Coumadin      NICM  History of LBBB  Chronic systolic heart failure   BiV ICD Medtronic (11/27/15)  -appears euvolemic.     History of cerebellar CVA:  -Continue home medication     Diabetes mellitus:  -hold metformin during the hospitalization  -Continue ISS     Rheumatoid arthritis:  -Pain controlled  -Immunocompromised due to sulfasalazine use  -On Norco at home     Chronic constipation:  -Continue stool softeners and Movantik     Hypothyroidism:  -Continue Synthroid     Hyperlipidemia:  -Continue statin     VTE prophylaxis: coumadin, SCD  CODE status: Full  Disposition: Home with . CPAP at night       Patient seen and examined at bedside.  Patient is now requiring 2 L of oxygen nasal cannula.  Patient is typically on room air at home.  Will obtain walk test prior to discharge.  Patient's INR today is 4.63, although improving, is supratherapeutic.  We will continue to monitor As patient's goal is 2.5-3.5.  Patient with significant increase in WBCs.  Which is now at 22.  Likely due to infectious process, as well as steroid use.  Will continue to monitor with acute event last night complaining of palpitations, no chest pain.  Troponins were negative, EKG noted some abnormal changes for possible acute MI.  Cardiology consulted.    VTE Prophylaxis:  Pharmacologic & mechanical VTE prophylaxis orders are present.             Disposition Planning:     Barriers to Discharge: Oxygenation requirement, INR  Anticipated Date of Discharge: 1/24/2025  Place of Discharge: Home      Time: 31 minutes     Code Status and Medical Interventions: CPR (Attempt to Resuscitate); Full Support   Ordered at: 01/21/25 9245     Level Of Support Discussed With:    Patient     Code Status (Patient has no pulse and is not breathing):    CPR (Attempt to Resuscitate)     Medical Interventions (Patient has pulse or is breathing):    Full Support       Signature: Electronically signed by OCHOA Oro, 01/23/25, 10:57 EST.  [facility]  Hospitalist Team

## 2025-01-23 NOTE — NURSING NOTE
Called again on the cardiologist consult. Patients EKG was abnormal and wanted to make sure the doctor was aware. Spoke with the doctor that will review the labs & EKG and let us know if anything needs to be done at this time or if it can wait till am. Passed on to next nurse findings.

## 2025-01-23 NOTE — PROGRESS NOTES
"Pharmacy dosing service  Anticoagulant  Warfarin     Subjective:    Nighat Montoya is a 64 y.o.female being continued on warfarin for Atrial Fibrillation / Flutter and Mechanical Mitral Valve.    INR Goal: 2.5 - 3.5  Home medication?: Alternating days of 4 mg and 3.5 mg of warfarin.  Bridge Therapy Present?:  No  Interacting Medications Evaluation (New/Present/Discontinued):   Doxycycline (1/22-1/26): may enhance the anticoagulant effect of warfarin  Furosemide (pta): may diminish the anticoagulant effect of warfarin  Methylprednisolone (1/22 - ): may enhance the anticoagulant effect of warfarin  Pantoprazole (pta omeprazole): may enhance the anticoagulant effect of warfarin  Sulfasalazine (pta): may enhance the anticoagulant effect of warfarin  Additional Contributing Factors:   No documented meal intake in last 24 hours.       Assessment/Plan:    Patient's INR is SUPRAtherapeutic today at 4.63, a decrease from 6.07 yesterday.   Will give home dose of warfarin 4 mg this evening as it is anticipated that the INR will continue to drop, ok per Gay PACKER.  Daily PT/INR ordered.       Continue to monitor and adjust based on INR.         Date 1/21 1/22 1/23         INR 7.1 6.07 4.63         Dose Hold 2 mg 4 mg           Objective:  [Ht: 154.9 cm (61\"); Wt: 63.5 kg (139 lb 14.4 oz); BMI: Body mass index is 26.43 kg/m².]    Lab Results   Component Value Date    ALBUMIN 3.2 (L) 01/23/2025     Lab Results   Component Value Date    INR 4.63 (H) 01/23/2025    INR 5.07 (C) 01/23/2025    INR 6.07 (C) 01/22/2025    PROTIME 43.3 (H) 01/23/2025    PROTIME 46.4 (H) 01/23/2025    PROTIME 53.4 (H) 01/22/2025     Lab Results   Component Value Date    HGB 11.7 (L) 01/23/2025    HGB 11.8 (L) 01/22/2025    HGB 12.4 01/21/2025     Lab Results   Component Value Date    HCT 38.6 01/23/2025    HCT 38.3 01/22/2025    HCT 41.3 01/21/2025     Vineet Mckinney [facility]  01/23/25 15:28 EST   "

## 2025-01-23 NOTE — CONSULTS
"    Cardiology Consult Note    Patient Identification:  Name: Nighat Montoya  Age: 64 y.o.  Sex: female  :  1960  MRN: [id number]             Requesting Physician :  Haylie Leslie APRN hospitalist    Reason for Consultation / Chief Complaint :   \"Acute MI on EKG\"    History of Present Illness:      Ms. Nighat Montoya has PMH of     -Nonischemic cardiomyopathy status post ICD (2015-Medtronic)  -Left bundle branch block  Paroxysmal atrial fibrillation  [id number] SCORE   [id number] Score: 6 (2025  6:49 PM)     Mechanical mitral valve replacement ([facility] 2014)  Tricuspid valve repair with ring 2014  Hypertension  Diabetes  CVA  Previous history of tonsillectomy tubal ligation endometrial ablation  cholecystectomy  Smoker, COPD  DAVIDE    Presented to the emergency room on 2025 with shortness of breath cough she denies any chest pain or fever.  Patient has had a productive cough with yellow sputum for about a week and outlying ED her oxygen was 88% on room air and was provided supplemental oxygen.  Her COVID and flu were negative she does report that her INR was 7.4 at home in the ED it was 7.1 and has gradually decreased to 4.63 today    Other labs in the ED showed negative high-sensitivity troponin CMP unremarkable except elevated glucose AST T36 procalcitonin negative WBC 11.54 increased to 22 today CT chest showed peripheral peribronchial nodular densities bilateral lower lobes right middle lobe consistent with infectious or inflammatory process bilateral lower lobe atelectasis and trace bilateral pleural effusions stable mild cardiomegaly with dense coronary calcifications and mitral valve replacement.    Cardiology was consulted for EKG reporting \"acute MI\" however after review of EKG patient is in a paced rhythm with underlying left bundle branch block.    Will have ICD interrogated  Check echocardiogram to assess LV function and valve abnormalities  Hold warfarin " until INR is between 2.5 and 3.5    Further assessment and plan per Dr. Barrera      Electronically signed by Gay Recinos, APRN, 01/23/25, 12:21 PM EST.      Cardiology attending addendum :    I have personally performed a face-to-face diagnostic evaluation, physical exam and reviewed data on this patient.  I have reviewed documentation done by me and nurse practitioner  and corrected as needed.  And agree with the different components of documentation.Greater than 50% of the time spent in the care of this patient was provided by attending consultant/me.              Assessment:  :    Abnormal EKG  Acute hypoxemic respiratory failure  COPD exacerbation, tobacco dependence  Coagulopathy due to Coumadin therapy  Valvular heart disease status post mechanical MVR, tricuspid valve repair with ring, pulmonary hypertension  Paroxysmal atrial fibrillation, long-term warfarin therapy  Left bundle branch block  Dilated cardiomyopathy, history of chronic HFrEF, now well compensated  BiV ICD Medtronic 11/27/2015  History of cerebellar CVA  Diabetes  Hypertension  Dyslipidemia    Recommendations / Plan:        Patient's EKG machine is reading as MI.  Patient does not have MI.  This is a paced rhythm.  Serial troponins are stone cold normal at 9---->7---> 7.  Patient appears euvolemic.  Patient's hypoxic respiratory failure appears to be due to COPD exacerbation.  Appreciated hospitalist note.  Patient is going at bronchodilators, antibiotics, decongestants and steroids.  Counseled on smoking cessation.  Will monitor telemetry.  Patient has A-fib and elevated [id number] score will benefit from long-term anticoagulation to prevent thromboembolic events.  Patient is on warfarin.  Patient also has mechanical MVR and would require long-term anticoagulation to keep INR between 2.5 and 3.5.  Will follow           Diagnosis Plan   1. Hypoxia        2. Elevated INR                       Cardiographics  ECG: EKG tracing was   personally reviewed/interpreted by me  ECG 12 Lead Rhythm Change   Preliminary Result   HEART RATE=97  bpm   RR Bmkfddly=430  ms   TX Vwilwsqw=648  ms   P Horizontal Axis=-41  deg   P Front Axis=-1  deg   QRSD Pjzthdbi=590  ms   QT Idntpwmr=193  ms   VSqP=652  ms   QRS Axis=135  deg   T Wave Axis=-26  deg   - ABNORMAL ECG -   Ventricular-paced complexes   Lateral infarct, acute (LAD)   Prolonged QT interval   Date and Time of Study:2025-01-22 16:52:42      ECG 12 Lead   ED Interpretation   Umm Geller APRN     1/21/2025  6:23 PM   ECG 12 Lead         Date/Time: 1/21/2025 2:50 PM      Performed by: Umm Geller APRN   Authorized by: Aric Quintana MD  Interpreted by ED physician   Comparison: compared with previous ECG from 11/12/2023   Similar to previous ECG   Rhythm comments: Atrial sensed ventricular paced rhythm   Rate: normal   Pacing capture: Atrial sensed ventricular paced rhythm.   Clinical impression: normal ECG      ECG 12 Lead Dyspnea   Final Result   HEART RATE=98  bpm   RR Kuotzuby=475  ms   TX Rpezyggg=748  ms   P Horizontal Axis=-43  deg   P Front Axis=19  deg   QRSD Xlbnbxhl=624  ms   QT Mijvljlz=806  ms   SAcR=584  ms   QRS Axis=132  deg   T Wave Axis=-26  deg   - ABNORMAL ECG -   Atrial-sensed ventricular-paced rhythm   Nonspecific  intraventricular conduction delay   When compared with ECG of 12-Nov-2023 09:34:43,   Significant change in rhythm   Electronically Signed By: Aric Quintana (MANDO) 2025-01-21 15:42:42   Date and Time of Study:2025-01-21 14:50:15      Telemetry Scan   Final Result      Telemetry Scan   Final Result      Telemetry Scan   Final Result      Telemetry Scan   Final Result      Telemetry Scan   Final Result      Telemetry Scan   Final Result      Telemetry Scan   Final Result      Telemetry Scan   Final Result      Telemetry Scan   Final Result          Telemetry: Sinus with left bundle branch block      Past Medical History:  Past Medical History:   Diagnosis Date     Arthritis     Cardiomyopathy     CHF (congestive heart failure)     COPD (chronic obstructive pulmonary disease)     Endometriosis     Hypertension     Mitral valve stenosis     Rheumatic fever     Thyroid disorder 2019     Past Surgical History:  Past Surgical History:   Procedure Laterality Date    ABDOMINAL SURGERY      CARDIAC CATHETERIZATION Left 2017    Procedure: Cardiac Catheterization/Vascular Study;  Surgeon: Pino Umaña MD;  Location: [facility] CATH INVASIVE LOCATION;  Service:     CARDIAC SURGERY       SECTION      CHOLECYSTECTOMY      D & C HYSTEROSCOPY WITH NOVASURE ENDOMETRIAL ABLATION AND MYOSURE      MITRAL VALVE REPLACEMENT      OTHER SURGICAL HISTORY      CARDIAC CATH PROCEDURE OUTCOME: SUCCESSFUL    TONSILLECTOMY      TUBAL ABDOMINAL LIGATION      VASCULAR SURGERY        Allergies:  Allergies   Allergen Reactions    Guaifenesin Er Rash    Metronidazole Rash    Budesonide Rash    Clindamycin Rash     Home Meds:  Medications Prior to Admission   Medication Sig Dispense Refill Last Dose/Taking    albuterol (PROVENTIL) (2.5 MG/3ML) 0.083% nebulizer solution Take 2.5 mg by nebulization Every 4 (Four) Hours As Needed for Wheezing.   2025    atorvastatin (LIPITOR) 40 MG tablet    2025    dicyclomine (BENTYL) 20 MG tablet Take 1 tablet by mouth 2 (Two) Times a Day.   2025    Fluticasone Furoate-Vilanterol 100-25 MCG/INH aerosol powder  Inhale 1 puff Daily.   2025    furosemide (LASIX) 20 MG tablet Take 1 tablet by mouth Daily.   2025    HYDROcodone-acetaminophen (NORCO) 7.5-325 MG per tablet Take 1 tablet by mouth Every 8 (Eight) Hours As Needed for Moderate Pain.   2025    levothyroxine (SYNTHROID, LEVOTHROID) 50 MCG tablet    2025    linaclotide (LINZESS) 290 MCG capsule capsule Take 72 mcg by mouth Every Morning Before Breakfast.   2025    meclizine (ANTIVERT) 12.5 MG tablet Take 1 tablet by mouth 2 (Two) Times a Day.   2025    metFORMIN  (GLUCOPHAGE) 500 MG tablet Take 1 tablet by mouth 2 (Two) Times a Day With Meals.   1/21/2025    metoprolol tartrate (LOPRESSOR) 25 MG tablet Take 1 tablet by mouth 2 (Two) Times a Day.   1/21/2025    omeprazole (priLOSEC) 40 MG capsule Take 1 capsule by mouth.   1/21/2025    potassium chloride (K-DUR) 10 MEQ CR tablet Take 1 tablet by mouth.   1/21/2025    SPIRIVA RESPIMAT 2.5 MCG/ACT aerosol solution inhaler Inhale 2 puffs Daily.   1/21/2025    sulfaSALAzine (AZULFIDINE) 500 MG tablet TAKE 1 TABLET BY MOUTH TWICE DAILY 180 tablet 0 1/21/2025    Vitamin D, Cholecalciferol, (CHOLECALCIFEROL) 10 MCG (400 UNIT) tablet Take 1 tablet by mouth 4 (Four) Times a Day. (Patient taking differently: Take 1 tablet by mouth 4 (Four) Times a Day. Pt unsure of dose)   Patient Taking Differently    warfarin (COUMADIN) 2.5 MG tablet Take 4 mg by mouth Every Other Day. Pt takes 4mg one day and 3.5 mg the next day   Patient Taking Differently    zolpidem (AMBIEN) 10 MG tablet Take 1 tablet by mouth Daily.   1/21/2025    Diclofenac Sodium (VOLTAREN) 1 % gel gel Apply 4 g topically to the appropriate area as directed 4 (Four) Times a Day As Needed (pain).   Unknown    ferrous sulfate 325 (65 FE) MG tablet Take  by mouth.   Unknown    methocarbamol (ROBAXIN) 500 MG tablet 1 tablet As Needed.   Unknown    ondansetron ODT (ZOFRAN-ODT) 4 MG disintegrating tablet    Unknown     Current Meds:     Current Facility-Administered Medications:     acetaminophen (TYLENOL) tablet 650 mg, 650 mg, Oral, Q4H PRN **OR** acetaminophen (TYLENOL) 160 MG/5ML oral solution 650 mg, 650 mg, Oral, Q4H PRN **OR** acetaminophen (TYLENOL) suppository 650 mg, 650 mg, Rectal, Q4H PRN, Shivani-Egziabher, Humphrey I, DO    atorvastatin (LIPITOR) tablet 20 mg, 20 mg, Oral, Daily, Shivani-Egziabher, Humphrey I, DO, 20 mg at 01/23/25 0845    sennosides-docusate (PERICOLACE) 8.6-50 MG per tablet 2 tablet, 2 tablet, Oral, BID PRN **AND** polyethylene glycol (MIRALAX) packet 17 g, 17  g, Oral, Daily PRN **AND** bisacodyl (DULCOLAX) EC tablet 5 mg, 5 mg, Oral, Daily PRN **AND** bisacodyl (DULCOLAX) suppository 10 mg, 10 mg, Rectal, Daily PRN, Shivani-Egziabher, Humphrey I, DO    calcium carbonate (TUMS) chewable tablet 500 mg (200 mg elemental), 2 tablet, Oral, TID PRN, Shivani-Egziabher, Humphrey I, DO    Calcium Replacement - Follow Nurse / BPA Driven Protocol, , Not Applicable, PRN, Shivani-Egziabher, Humphrey I, DO    dextrose (D50W) (25 g/50 mL) IV injection 25 g, 25 g, Intravenous, Q15 Min PRN, Shivani-Egziabher, Humphrey I, DO    dextrose (GLUTOSE) oral gel 15 g, 15 g, Oral, Q15 Min PRN, Shivani-Egziabher, Humphrey I, DO    doxycycline (MONODOX) capsule 100 mg, 100 mg, Oral, Q12H, Shivani-Egziabher, Humphrey I, DO, 100 mg at 01/23/25 1241    furosemide (LASIX) tablet 20 mg, 20 mg, Oral, Daily, Shivani-Egziabher, Humphrey I, DO, 20 mg at 01/23/25 0845    glucagon (GLUCAGEN) injection 1 mg, 1 mg, Intramuscular, Q15 Min PRN, Shivani-Egziabher, Humphrey I, DO    HYDROcodone-acetaminophen (NORCO) 7.5-325 MG per tablet 1 tablet, 1 tablet, Oral, Q8H PRN, Shivani-Egziabher, Humphrey I, DO, 1 tablet at 01/22/25 2036    insulin lispro (HUMALOG/ADMELOG) injection 2-9 Units, 2-9 Units, Subcutaneous, 4x Daily AC & at Bedtime, Haylie Leslie, APRN, 2 Units at 01/23/25 1753    ipratropium-albuterol (DUO-NEB) nebulizer solution 3 mL, 3 mL, Nebulization, 4x Daily - RT, Shivani-Egziab, Humphrey I, DO, 3 mL at 01/23/25 1449    levothyroxine (SYNTHROID, LEVOTHROID) tablet 50 mcg, 50 mcg, Oral, Q AM, Humphrey Abel, , 50 mcg at 01/23/25 0539    lubiprostone (AMITIZA) capsule 8 mcg, 8 mcg, Oral, BID, Humphrey Abel, , 8 mcg at 01/23/25 0845    Magnesium Standard Dose Replacement - Follow Nurse / BPA Driven Protocol, , Not Applicable, PRN, Humphrey Abel, DO    methylPREDNISolone sodium succinate (SOLU-Medrol) injection 20 mg, 20 mg, Intravenous, Q12H, Haylie Leslie, OCHOA    metoprolol tartrate (LOPRESSOR) tablet 25 mg, 25 mg,  Oral, BID, Shivani-Egziabher, Humphrey I, DO, 25 mg at 25 0845    ondansetron ODT (ZOFRAN-ODT) disintegrating tablet 4 mg, 4 mg, Oral, Q6H PRN **OR** ondansetron (ZOFRAN) injection 4 mg, 4 mg, Intravenous, Q6H PRN, Shivani-Egziabher, Humphrey I, DO    pantoprazole (PROTONIX) EC tablet 40 mg, 40 mg, Oral, Q AM, Shivani-Egziabher, Humphrey I, DO, 40 mg at 25 0539    Pharmacy to dose warfarin, , Not Applicable, Continuous PRN, Shivani-Egziabher, Humphrey I, DO    Phosphorus Replacement - Follow Nurse / BPA Driven Protocol, , Not Applicable, PRN, Shivani-Egziabher, Humphrey I, DO    Potassium Replacement - Follow Nurse / BPA Driven Protocol, , Not Applicable, PRN, Shivani-Egziabher, Humphrey I, DO    sodium chloride 0.9 % flush 10 mL, 10 mL, Intravenous, Q12H, Shivani-Egziabher, Humphrey I, DO, 10 mL at 25 0845    sodium chloride 0.9 % flush 10 mL, 10 mL, Intravenous, PRN, Shivani-Egziabher, Humphrey I, DO    sodium chloride 0.9 % infusion 40 mL, 40 mL, Intravenous, PRN, Shivani-Egziabher, Humphrey I, DO    sulfaSALAzine (AZULFIDINE) tablet 500 mg, 500 mg, Oral, BID, Shivani-Egziabher, Humphrey I, DO, 500 mg at 25 0845    zolpidem (AMBIEN) tablet 5 mg, 5 mg, Oral, Nightly PRN, Shivani-Egziabher, Humphrey I, DO, 5 mg at 25 0118    zolpidem (AMBIEN) tablet 5 mg, 5 mg, Oral, Nightly PRN, Shivani-Egziabher, Humphrey I, DO, 5 mg at 25 0119  Social History:   Social History     Tobacco Use    Smoking status: Every Day     Current packs/day: 0.00     Average packs/day: 0.5 packs/day for 15.0 years (7.5 ttl pk-yrs)     Types: Cigarettes     Start date: 2000     Last attempt to quit: 2015     Years since quittin.9    Smokeless tobacco: Never   Substance Use Topics    Alcohol use: No      Family History:  Family History   Problem Relation Age of Onset    Lung cancer Father     Lung cancer Brother         Review of Systems : Review of Systems   Constitutional: Positive for malaise/fatigue.   Cardiovascular:  Positive for dyspnea on exertion. Negative  "for chest pain and irregular heartbeat.   Respiratory:  Positive for cough, shortness of breath and sputum production.    All other systems reviewed and are negative.         Constitutional:  Temp:  [96 °F (35.6 °C)-98 °F (36.7 °C)] 97.4 °F (36.3 °C)  Heart Rate:  [75-98] 98  Resp:  [16-27] 27  BP: (103-150)/(47-56) 150/54    Physical Exam   /54 (BP Location: Left arm, Patient Position: Sitting)   Pulse 98   Temp 97.4 °F (36.3 °C) (Temporal)   Resp 27   Ht 154.9 cm (61\")   Wt 63.5 kg (139 lb 14.4 oz)   SpO2 92%   BMI 26.43 kg/m²   Physical Exam  General:  Appears in no acute distress  Eyes: Sclerae are anicteric,  conjunctivae are clear   HEENT:  No JVD. Thyroid not visibly enlarged. No mucosal pallor or cyanosis  Respiratory: Respirations regular and unlabored at rest.  Scattered rhonchi  Cardiovascular: S1,S2 Regular rate and rhythm. 2/6 murmur, no rub or gallop auscultated. No pretibial pitting edema  Gastrointestinal: Abdomen nondistended.  Musculoskeletal:  No abnormal movements  Extremities: No digital clubbing or cyanosis  Skin: Color pink. Skin warm and dry to touch.   Neuro: Alert and awake, no lateralizing deficits appreciated        Echocardiogram:   Results for orders placed during the hospital encounter of 01/22/17    Adult Transesophageal Echo    Interpretation Summary  · Left ventricular function is moderately decreased. Estimated EF = 35%. Endocardial border definition is limited on this transthoracic study with limited gastric views. Overall the systolic function appeared to be moderately reduced. Global left ventricular wall motion appears abnormal. The left ventricular cavity is moderately dilated.  · Electronic lead present in the right ventricle.  · Left atrial cavity size is moderately dilated. No evidence of a left atrial thrombus present. There is no spontaneous echo contrast present. The left atrial appendage was visualized through multiple planes, and was found to be multilobar " in nature. Doppler interrogation shows mildly reduced flow within the left atrial appendage. The interatrial septum does not appear to be redundant. Lipomatous hypertrophy of the interatrial septum present. Saline test results are negative.  · An electronic lead is present in the right atrium.  · There is mild thickening of the aortic valve. Trace aortic valve regurgitation is present.  · There is a bileaflet mechanical mitral valve prosthesis present. The prosthetic valve is normal. There is normal closing volume mitral regurgitation noted. There is echo bright area aligned with a mechanical mitral valve leaflets and the left ventricle this is most consistent with artifact. No other independently mobile debris was noted. There was no evidence of vegetation or thrombus..  · There is evidence of previous tricuspid valve repair. The tricuspid valve is slightly sclerotic. The right ventricular pacemaker wire appears to transverse between the leaflet tips. There is trivial tricuspid regurgitation however TR velocity could not be obtained to adequately assess systolic pressure.      Imaging  Chest X-ray:   Imaging Results (Last 24 Hours)       ** No results found for the last 24 hours. **            Lab Review: I have reviewed the labs  Results from last 7 days   Lab Units 01/22/25  1937 01/22/25  1810 01/21/25  1614   HSTROP T ng/L 7 7 9     Results from last 7 days   Lab Units 01/23/25  0601   MAGNESIUM mg/dL 2.2     Results from last 7 days   Lab Units 01/23/25  0601   SODIUM mmol/L 138   POTASSIUM mmol/L 5.1   BUN mg/dL 9   CREATININE mg/dL 0.56*   CALCIUM mg/dL 8.9             Results from last 7 days   Lab Units 01/23/25  0922 01/22/25  0153 01/21/25  1512   WBC 10*3/mm3 22.20* 11.54* 10.94*   HEMOGLOBIN g/dL 11.7* 11.8* 12.4   HEMATOCRIT % 38.6 38.3 41.3   PLATELETS 10*3/mm3 474* 395 444     Results from last 7 days   Lab Units 01/23/25  0922 01/23/25  0601 01/22/25  0153   INR  4.63* 5.07* 6.07*              Yoshi Barrera MD  1/23/2025, 18:49 EST      JOSEFA Day/Transcription:   Dictated utilizing Dragon dictation

## 2025-01-23 NOTE — PLAN OF CARE
Problem: Adult Inpatient Plan of Care  Goal: Plan of Care Review  Outcome: Progressing  Goal: Patient-Specific Goal (Individualized)  Outcome: Progressing  Goal: Absence of Hospital-Acquired Illness or Injury  Outcome: Progressing  Intervention: Identify and Manage Fall Risk  Recent Flowsheet Documentation  Taken 1/23/2025 1452 by Clarita Mills LPN  Safety Promotion/Fall Prevention:   activity supervised   assistive device/personal items within reach   clutter free environment maintained   lighting adjusted   room organization consistent   safety round/check completed  Taken 1/23/2025 1253 by Clarita Mills LPN  Safety Promotion/Fall Prevention:   activity supervised   assistive device/personal items within reach   clutter free environment maintained   lighting adjusted   room organization consistent   safety round/check completed  Taken 1/23/2025 1006 by Clarita Mills LPN  Safety Promotion/Fall Prevention:   activity supervised   assistive device/personal items within reach   clutter free environment maintained   lighting adjusted   room organization consistent   safety round/check completed  Taken 1/23/2025 0841 by Clarita Mills LPN  Safety Promotion/Fall Prevention:   activity supervised   assistive device/personal items within reach   clutter free environment maintained   lighting adjusted   room organization consistent   safety round/check completed  Intervention: Prevent Skin Injury  Recent Flowsheet Documentation  Taken 1/23/2025 1253 by Clarita Mills LPN  Skin Protection: incontinence pads utilized  Taken 1/23/2025 0841 by Clarita Mills LPN  Skin Protection: incontinence pads utilized  Intervention: Prevent and Manage VTE (Venous Thromboembolism) Risk  Recent Flowsheet Documentation  Taken 1/23/2025 0841 by Clarita Mills LPN  VTE Prevention/Management:   SCDs (sequential compression devices) off   patient refused intervention  Intervention: Prevent Infection  Recent Flowsheet  Documentation  Taken 1/23/2025 1452 by Clarita Mills LPN  Infection Prevention:   cohorting utilized   environmental surveillance performed   hand hygiene promoted   personal protective equipment utilized   rest/sleep promoted   single patient room provided   visitors restricted/screened  Taken 1/23/2025 1253 by Clarita Mills LPN  Infection Prevention:   cohorting utilized   environmental surveillance performed   equipment surfaces disinfected   personal protective equipment utilized   rest/sleep promoted   single patient room provided   visitors restricted/screened  Taken 1/23/2025 1006 by Clarita Mills LPN  Infection Prevention:   cohorting utilized   environmental surveillance performed   hand hygiene promoted   personal protective equipment utilized   rest/sleep promoted   single patient room provided   visitors restricted/screened  Taken 1/23/2025 0841 by Clarita Mills LPN  Infection Prevention:   cohorting utilized   environmental surveillance performed   hand hygiene promoted   personal protective equipment utilized   rest/sleep promoted   single patient room provided   visitors restricted/screened  Goal: Optimal Comfort and Wellbeing  Outcome: Progressing  Intervention: Monitor Pain and Promote Comfort  Recent Flowsheet Documentation  Taken 1/23/2025 1253 by Clarita Mills LPN  Pain Management Interventions:   care clustered   diversional activity provided  Taken 1/23/2025 0841 by Clarita Mills LPN  Pain Management Interventions:   care clustered   diversional activity provided   position adjusted  Intervention: Provide Person-Centered Care  Recent Flowsheet Documentation  Taken 1/23/2025 1253 by Clarita Mills LPN  Trust Relationship/Rapport:   care explained   choices provided  Taken 1/23/2025 0841 by Clarita Mills LPN  Trust Relationship/Rapport:   care explained   choices provided   thoughts/feelings acknowledged  Goal: Readiness for Transition of Care  Outcome: Progressing      Problem: Adult Inpatient Plan of Care  Goal: Absence of Hospital-Acquired Illness or Injury  Intervention: Prevent Skin Injury  Recent Flowsheet Documentation  Taken 1/23/2025 1253 by Clarita Mills LPN  Skin Protection: incontinence pads utilized  Taken 1/23/2025 0841 by Clarita Mills LPN  Skin Protection: incontinence pads utilized     Problem: Adult Inpatient Plan of Care  Goal: Absence of Hospital-Acquired Illness or Injury  Intervention: Prevent and Manage VTE (Venous Thromboembolism) Risk  Recent Flowsheet Documentation  Taken 1/23/2025 0841 by Clarita Mills LPN  VTE Prevention/Management:   SCDs (sequential compression devices) off   patient refused intervention     Problem: Adult Inpatient Plan of Care  Goal: Absence of Hospital-Acquired Illness or Injury  Intervention: Prevent Infection  Recent Flowsheet Documentation  Taken 1/23/2025 1452 by Clarita Mills LPN  Infection Prevention:   cohorting utilized   environmental surveillance performed   hand hygiene promoted   personal protective equipment utilized   rest/sleep promoted   single patient room provided   visitors restricted/screened  Taken 1/23/2025 1253 by Clarita Mills LPN  Infection Prevention:   cohorting utilized   environmental surveillance performed   equipment surfaces disinfected   personal protective equipment utilized   rest/sleep promoted   single patient room provided   visitors restricted/screened  Taken 1/23/2025 1006 by Clarita Mills LPN  Infection Prevention:   cohorting utilized   environmental surveillance performed   hand hygiene promoted   personal protective equipment utilized   rest/sleep promoted   single patient room provided   visitors restricted/screened  Taken 1/23/2025 0841 by Clarita Mills LPN  Infection Prevention:   cohorting utilized   environmental surveillance performed   hand hygiene promoted   personal protective equipment utilized   rest/sleep promoted   single patient room provided   visitors  restricted/screened     Problem: Adult Inpatient Plan of Care  Goal: Optimal Comfort and Wellbeing  Outcome: Progressing  Intervention: Monitor Pain and Promote Comfort  Recent Flowsheet Documentation  Taken 1/23/2025 1253 by Clarita Mills LPN  Pain Management Interventions:   care clustered   diversional activity provided  Taken 1/23/2025 0841 by Clarita Mills LPN  Pain Management Interventions:   care clustered   diversional activity provided   position adjusted  Intervention: Provide Person-Centered Care  Recent Flowsheet Documentation  Taken 1/23/2025 1253 by Clarita Mills LPN  Trust Relationship/Rapport:   care explained   choices provided  Taken 1/23/2025 0841 by Clarita Mills LPN  Trust Relationship/Rapport:   care explained   choices provided   thoughts/feelings acknowledged     Problem: Adult Inpatient Plan of Care  Goal: Optimal Comfort and Wellbeing  Intervention: Provide Person-Centered Care  Recent Flowsheet Documentation  Taken 1/23/2025 1253 by Clarita Mills LPN  Trust Relationship/Rapport:   care explained   choices provided  Taken 1/23/2025 0841 by Clarita Mills LPN  Trust Relationship/Rapport:   care explained   choices provided   thoughts/feelings acknowledged     Problem: Comorbidity Management  Goal: Maintenance of COPD Symptom Control  Outcome: Progressing  Intervention: Maintain COPD (Chronic Obstructive Pulmonary Disease) Symptom Control  Recent Flowsheet Documentation  Taken 1/23/2025 0841 by Clarita Mills LPN  Medication Review/Management: medications reviewed  Goal: Blood Glucose Level Within Target Range  Outcome: Progressing  Intervention: Monitor and Manage Glycemia  Recent Flowsheet Documentation  Taken 1/23/2025 0841 by Clarita Mills LPN  Medication Review/Management: medications reviewed  Goal: Maintenance of Heart Failure Symptom Control  Outcome: Progressing  Intervention: Maintain Heart Failure Management  Recent Flowsheet Documentation  Taken 1/23/2025  0841 by Clarita Mills LPN  Medication Review/Management: medications reviewed  Goal: Blood Pressure in Desired Range  Outcome: Progressing  Intervention: Maintain Blood Pressure Management  Recent Flowsheet Documentation  Taken 1/23/2025 0841 by Clarita Mills LPN  Medication Review/Management: medications reviewed     Problem: Fall Injury Risk  Goal: Absence of Fall and Fall-Related Injury  Outcome: Progressing  Intervention: Identify and Manage Contributors  Recent Flowsheet Documentation  Taken 1/23/2025 0841 by Clarita Mills LPN  Medication Review/Management: medications reviewed  Intervention: Promote Injury-Free Environment  Recent Flowsheet Documentation  Taken 1/23/2025 1452 by Clarita Mills LPN  Safety Promotion/Fall Prevention:   activity supervised   assistive device/personal items within reach   clutter free environment maintained   lighting adjusted   room organization consistent   safety round/check completed  Taken 1/23/2025 1253 by Clarita Mills LPN  Safety Promotion/Fall Prevention:   activity supervised   assistive device/personal items within reach   clutter free environment maintained   lighting adjusted   room organization consistent   safety round/check completed  Taken 1/23/2025 1006 by Clarita Mills LPN  Safety Promotion/Fall Prevention:   activity supervised   assistive device/personal items within reach   clutter free environment maintained   lighting adjusted   room organization consistent   safety round/check completed  Taken 1/23/2025 0841 by Clarita Mills LPN  Safety Promotion/Fall Prevention:   activity supervised   assistive device/personal items within reach   clutter free environment maintained   lighting adjusted   room organization consistent   safety round/check completed   Goal Outcome Evaluation:      Patient has no c/o sob at this time. Nonproductive cough continues. No c/o cardiac issues reported to staff. Patient on 2 LPM of oxygen per nasal cannula. O2  is attempting to be weaned down. Patient has no c/o pain or discomfort noted. Patient continues with IV steroids per order. Call light within reach.

## 2025-01-24 ENCOUNTER — READMISSION MANAGEMENT (OUTPATIENT)
Dept: CALL CENTER | Facility: HOSPITAL | Age: 65
End: 2025-01-24
Payer: MEDICARE

## 2025-01-24 VITALS
BODY MASS INDEX: 26.41 KG/M2 | SYSTOLIC BLOOD PRESSURE: 118 MMHG | RESPIRATION RATE: 15 BRPM | HEIGHT: 61 IN | HEART RATE: 87 BPM | WEIGHT: 139.9 LBS | OXYGEN SATURATION: 95 % | DIASTOLIC BLOOD PRESSURE: 60 MMHG | TEMPERATURE: 96 F

## 2025-01-24 LAB
BASOPHILS # BLD AUTO: 0.06 10*3/MM3 (ref 0–0.2)
BASOPHILS NFR BLD AUTO: 0.3 % (ref 0–1.5)
DEPRECATED RDW RBC AUTO: 49.6 FL (ref 37–54)
EOSINOPHIL # BLD AUTO: 0.01 10*3/MM3 (ref 0–0.4)
EOSINOPHIL NFR BLD AUTO: 0.1 % (ref 0.3–6.2)
ERYTHROCYTE [DISTWIDTH] IN BLOOD BY AUTOMATED COUNT: 15.1 % (ref 12.3–15.4)
GLUCOSE BLDC GLUCOMTR-MCNC: 128 MG/DL (ref 70–105)
HCT VFR BLD AUTO: 36.9 % (ref 34–46.6)
HGB BLD-MCNC: 11.4 G/DL (ref 12–15.9)
IMM GRANULOCYTES # BLD AUTO: 0.14 10*3/MM3 (ref 0–0.05)
IMM GRANULOCYTES NFR BLD AUTO: 0.7 % (ref 0–0.5)
INR PPP: 3.8 (ref 2–3)
LYMPHOCYTES # BLD AUTO: 1.13 10*3/MM3 (ref 0.7–3.1)
LYMPHOCYTES NFR BLD AUTO: 6 % (ref 19.6–45.3)
MCH RBC QN AUTO: 27.7 PG (ref 26.6–33)
MCHC RBC AUTO-ENTMCNC: 30.9 G/DL (ref 31.5–35.7)
MCV RBC AUTO: 89.6 FL (ref 79–97)
MONOCYTES # BLD AUTO: 0.63 10*3/MM3 (ref 0.1–0.9)
MONOCYTES NFR BLD AUTO: 3.4 % (ref 5–12)
NEUTROPHILS NFR BLD AUTO: 16.83 10*3/MM3 (ref 1.7–7)
NEUTROPHILS NFR BLD AUTO: 89.5 % (ref 42.7–76)
NRBC BLD AUTO-RTO: 0 /100 WBC (ref 0–0.2)
PLATELET # BLD AUTO: 477 10*3/MM3 (ref 140–450)
PMV BLD AUTO: 9.7 FL (ref 6–12)
PROTHROMBIN TIME: 37.1 SECONDS (ref 19.4–28.5)
RBC # BLD AUTO: 4.12 10*6/MM3 (ref 3.77–5.28)
WBC NRBC COR # BLD AUTO: 18.8 10*3/MM3 (ref 3.4–10.8)

## 2025-01-24 PROCEDURE — 85025 COMPLETE CBC W/AUTO DIFF WBC: CPT | Performed by: NURSE PRACTITIONER

## 2025-01-24 PROCEDURE — 99232 SBSQ HOSP IP/OBS MODERATE 35: CPT | Performed by: INTERNAL MEDICINE

## 2025-01-24 PROCEDURE — 94799 UNLISTED PULMONARY SVC/PX: CPT

## 2025-01-24 PROCEDURE — 82948 REAGENT STRIP/BLOOD GLUCOSE: CPT | Performed by: HOSPITALIST

## 2025-01-24 PROCEDURE — 94664 DEMO&/EVAL PT USE INHALER: CPT

## 2025-01-24 PROCEDURE — 25010000002 METHYLPREDNISOLONE PER 40 MG: Performed by: NURSE PRACTITIONER

## 2025-01-24 PROCEDURE — 85610 PROTHROMBIN TIME: CPT | Performed by: NURSE PRACTITIONER

## 2025-01-24 PROCEDURE — 94618 PULMONARY STRESS TESTING: CPT

## 2025-01-24 PROCEDURE — 94761 N-INVAS EAR/PLS OXIMETRY MLT: CPT

## 2025-01-24 RX ORDER — DOXYCYCLINE 100 MG/1
100 CAPSULE ORAL EVERY 12 HOURS
Qty: 5 CAPSULE | Refills: 0 | Status: SHIPPED | OUTPATIENT
Start: 2025-01-24 | End: 2025-01-27

## 2025-01-24 RX ORDER — PREDNISONE 10 MG/1
TABLET ORAL
Qty: 48 EACH | Refills: 0 | Status: SHIPPED | OUTPATIENT
Start: 2025-01-24

## 2025-01-24 RX ADMIN — Medication 10 ML: at 08:38

## 2025-01-24 RX ADMIN — METOPROLOL TARTRATE 25 MG: 25 TABLET, FILM COATED ORAL at 08:39

## 2025-01-24 RX ADMIN — METHYLPREDNISOLONE SODIUM SUCCINATE 20 MG: 40 INJECTION, POWDER, FOR SOLUTION INTRAMUSCULAR; INTRAVENOUS at 08:39

## 2025-01-24 RX ADMIN — IPRATROPIUM BROMIDE AND ALBUTEROL SULFATE 3 ML: .5; 3 SOLUTION RESPIRATORY (INHALATION) at 07:23

## 2025-01-24 RX ADMIN — LUBIPROSTONE 8 MCG: 8 CAPSULE, GELATIN COATED ORAL at 08:39

## 2025-01-24 RX ADMIN — PANTOPRAZOLE SODIUM 40 MG: 40 TABLET, DELAYED RELEASE ORAL at 05:33

## 2025-01-24 RX ADMIN — FUROSEMIDE 20 MG: 20 TABLET ORAL at 08:47

## 2025-01-24 RX ADMIN — SULFASALAZINE 500 MG: 500 TABLET ORAL at 08:39

## 2025-01-24 RX ADMIN — LEVOTHYROXINE SODIUM 50 MCG: 50 TABLET ORAL at 05:33

## 2025-01-24 RX ADMIN — DOXYCYCLINE 100 MG: 100 CAPSULE ORAL at 00:02

## 2025-01-24 RX ADMIN — ATORVASTATIN CALCIUM 20 MG: 20 TABLET, FILM COATED ORAL at 08:39

## 2025-01-24 NOTE — DISCHARGE PLACEMENT REQUEST
"Axel Montoya (64 y.o. Female)       Date of Birth   1960    Social Security Number       Address   [place] [address]    Home Phone   [phone]    MRN   [id number]       Hinduism   Anabaptist    Marital Status                               Admission Date   1/21/25    Admission Type   Urgent    Admitting Provider   Humphrey Abel DO    Attending Provider   Haritha Dumont MD    Department, Room/Bed   [facility] 2D, 265/1       Discharge Date       Discharge Disposition   Home or Self Care    Discharge Destination                                 Attending Provider: Haritha Dumont MD    Allergies: Guaifenesin Er, Metronidazole, Budesonide, Clindamycin    Isolation: None   Infection: None   Code Status: CPR    Ht: 154.9 cm (61\")   Wt: 63.5 kg (139 lb 14.4 oz)    Admission Cmt: None   Principal Problem: Acute hypoxemic respiratory failure [J96.01]                   Active Insurance as of 1/21/2025       Primary Coverage       Payor Plan Insurance Group Employer/Plan Group    HUMANA MEDICARE REPLACEMENT HUMANA MED ADV SNP HMO [id number]       Payor Plan Address Payor Plan Phone Number Payor Plan Fax Number Effective Dates    [address] [address] [phone]  1/1/2024 - None Entered    [facility] [address]         Subscriber Name Subscriber Birth Date Member ID       AXEL MONTOYA 1960 [id number]               Secondary Coverage       Payor Plan Insurance Group Employer/Plan Group    [place]        Payor Plan Address Payor Plan Phone Number Payor Plan Fax Number Effective Dates    [address] [address]   2/12/2020 - None Entered    [place] [address]         Subscriber Name Subscriber Birth Date Member ID       AXEL MONTOYA 1960 [id number]                     Emergency Contacts        (Rel.) Home Phone Work Phone Mobile Phone    LindsayMaxime (Spouse) [phone] -- [phone]    JOHNNY MONTOYA (Daughter) -- -- " [phone]

## 2025-01-24 NOTE — PLAN OF CARE
Goal Outcome Evaluation:  Plan of Care Reviewed With: patient        Progress: no change  Outcome Evaluation: Triggered sepsis for HR, RR, WBC, hypoxia, cough. Physician notified, no new orders. Pt denies SOB, pain, dizziness. IV solumedrol Q12. call light within reach, care continues.       Problem: Adult Inpatient Plan of Care  Goal: Plan of Care Review  Outcome: Progressing  Flowsheets (Taken 1/24/2025 0450)  Progress: no change  Outcome Evaluation: Triggered sepsis for HR, RR, WBC, hypoxia, cough. Physician notified, no new orders. Pt denies SOB, pain, dizziness. IV solumedrol Q12. call light within reach, care continues.  Plan of Care Reviewed With: patient  Goal: Patient-Specific Goal (Individualized)  Outcome: Progressing  Goal: Absence of Hospital-Acquired Illness or Injury  Outcome: Progressing  Intervention: Identify and Manage Fall Risk  Recent Flowsheet Documentation  Taken 1/24/2025 0400 by Roxanne Longo, [facility]  Safety Promotion/Fall Prevention:   activity supervised   assistive device/personal items within reach   clutter free environment maintained   fall prevention program maintained   nonskid shoes/slippers when out of bed   room organization consistent   safety round/check completed  Taken 1/24/2025 0200 by Roxanne Longo, [facility]  Safety Promotion/Fall Prevention:   activity supervised   assistive device/personal items within reach   clutter free environment maintained   fall prevention program maintained   nonskid shoes/slippers when out of bed   room organization consistent   safety round/check completed  Taken 1/24/2025 0000 by Roxanne Longo, RN  Safety Promotion/Fall Prevention:   activity supervised   assistive device/personal items within reach   clutter free environment maintained   fall prevention program maintained   nonskid shoes/slippers when out of bed   room organization consistent   safety round/check completed  Taken 1/23/2025 2200 by Roxanne Longo, RN  Safety Promotion/Fall Prevention:   activity  supervised   assistive device/personal items within reach   clutter free environment maintained   fall prevention program maintained   nonskid shoes/slippers when out of bed   room organization consistent   safety round/check completed  Taken 1/23/2025 1925 by Roxanne Longo RN  Safety Promotion/Fall Prevention:   activity supervised   assistive device/personal items within reach   clutter free environment maintained   fall prevention program maintained   nonskid shoes/slippers when out of bed   safety round/check completed   room organization consistent  Intervention: Prevent Skin Injury  Recent Flowsheet Documentation  Taken 1/23/2025 1925 by Roxanne Longo RN  Body Position: position changed independently  Intervention: Prevent Infection  Recent Flowsheet Documentation  Taken 1/24/2025 0400 by Roxanne Longo RN  Infection Prevention:   hand hygiene promoted   personal protective equipment utilized   rest/sleep promoted   single patient room provided  Taken 1/24/2025 0200 by Roxanne Longo RN  Infection Prevention:   hand hygiene promoted   personal protective equipment utilized   rest/sleep promoted   single patient room provided  Taken 1/24/2025 0000 by Roxanne Longo RN  Infection Prevention:   hand hygiene promoted   personal protective equipment utilized   rest/sleep promoted   single patient room provided  Taken 1/23/2025 2200 by Roxanne Longo RN  Infection Prevention:   hand hygiene promoted   personal protective equipment utilized   rest/sleep promoted   single patient room provided  Taken 1/23/2025 1925 by Roxanne Longo RN  Infection Prevention:   hand hygiene promoted   personal protective equipment utilized   rest/sleep promoted   single patient room provided  Goal: Optimal Comfort and Wellbeing  Outcome: Progressing  Intervention: Provide Person-Centered Care  Recent Flowsheet Documentation  Taken 1/24/2025 0400 by Roxanne Longo RN  Trust Relationship/Rapport: care explained  Taken 1/24/2025 0000 by Roxanne Longo RN Trust  Relationship/Rapport:   care explained   thoughts/feelings acknowledged  Taken 1/23/2025 1925 by Roxanne Longo RN  Trust Relationship/Rapport:   care explained   thoughts/feelings acknowledged  Goal: Readiness for Transition of Care  Outcome: Progressing     Problem: Comorbidity Management  Goal: Maintenance of COPD Symptom Control  Outcome: Progressing  Intervention: Maintain COPD (Chronic Obstructive Pulmonary Disease) Symptom Control  Recent Flowsheet Documentation  Taken 1/24/2025 0400 by Roxanne Longo RN  Medication Review/Management: medications reviewed  Taken 1/24/2025 0200 by Roxanne Longo RN  Medication Review/Management: medications reviewed  Taken 1/24/2025 0000 by Roxanne Longo RN  Medication Review/Management: medications reviewed  Taken 1/23/2025 2200 by Roxanne Longo RN  Medication Review/Management: medications reviewed  Taken 1/23/2025 1925 by Roxanne Longo RN  Medication Review/Management: medications reviewed  Goal: Blood Glucose Level Within Target Range  Outcome: Progressing  Intervention: Monitor and Manage Glycemia  Recent Flowsheet Documentation  Taken 1/24/2025 0400 by Roxanne Longo RN  Medication Review/Management: medications reviewed  Taken 1/24/2025 0200 by Roxanne Longo RN  Medication Review/Management: medications reviewed  Taken 1/24/2025 0000 by Roxanne Longo RN  Medication Review/Management: medications reviewed  Taken 1/23/2025 2200 by Roxanne Longo RN  Medication Review/Management: medications reviewed  Taken 1/23/2025 1925 by Roxanne Longo RN  Medication Review/Management: medications reviewed  Goal: Maintenance of Heart Failure Symptom Control  Outcome: Progressing  Intervention: Maintain Heart Failure Management  Recent Flowsheet Documentation  Taken 1/24/2025 0400 by Roxanne Longo RN  Medication Review/Management: medications reviewed  Taken 1/24/2025 0200 by Roxanne Longo RN  Medication Review/Management: medications reviewed  Taken 1/24/2025 0000 by Roxanne Longo RN  Medication Review/Management:  medications reviewed  Taken 1/23/2025 2200 by Roxanne Longo RN  Medication Review/Management: medications reviewed  Taken 1/23/2025 1925 by Roxanne Longo RN  Medication Review/Management: medications reviewed  Goal: Blood Pressure in Desired Range  Outcome: Progressing  Intervention: Maintain Blood Pressure Management  Recent Flowsheet Documentation  Taken 1/24/2025 0400 by Roxanne Longo RN  Medication Review/Management: medications reviewed  Taken 1/24/2025 0200 by Roxanne Longo RN  Medication Review/Management: medications reviewed  Taken 1/24/2025 0000 by Roxanne Longo RN  Medication Review/Management: medications reviewed  Taken 1/23/2025 2200 by Roxanne Longo RN  Medication Review/Management: medications reviewed  Taken 1/23/2025 1925 by Roxanne Longo RN  Medication Review/Management: medications reviewed     Problem: Breathing Pattern Ineffective  Goal: Effective Breathing Pattern  Outcome: Progressing     Problem: Fall Injury Risk  Goal: Absence of Fall and Fall-Related Injury  Outcome: Progressing  Intervention: Identify and Manage Contributors  Recent Flowsheet Documentation  Taken 1/24/2025 0400 by Roxanne Longo RN  Medication Review/Management: medications reviewed  Taken 1/24/2025 0200 by Roxanne Longo RN  Medication Review/Management: medications reviewed  Taken 1/24/2025 0000 by Roxanne Longo RN  Medication Review/Management: medications reviewed  Taken 1/23/2025 2200 by Roxanne Longo RN  Medication Review/Management: medications reviewed  Taken 1/23/2025 1925 by Roxanne Longo RN  Medication Review/Management: medications reviewed  Intervention: Promote Injury-Free Environment  Recent Flowsheet Documentation  Taken 1/24/2025 0400 by Roxanne Longo RN  Safety Promotion/Fall Prevention:   activity supervised   assistive device/personal items within reach   clutter free environment maintained   fall prevention program maintained   nonskid shoes/slippers when out of bed   room organization consistent   safety round/check  completed  Taken 1/24/2025 0200 by Roxanne Longo RN  Safety Promotion/Fall Prevention:   activity supervised   assistive device/personal items within reach   clutter free environment maintained   fall prevention program maintained   nonskid shoes/slippers when out of bed   room organization consistent   safety round/check completed  Taken 1/24/2025 0000 by Roxanne Longo RN  Safety Promotion/Fall Prevention:   activity supervised   assistive device/personal items within reach   clutter free environment maintained   fall prevention program maintained   nonskid shoes/slippers when out of bed   room organization consistent   safety round/check completed  Taken 1/23/2025 2200 by Roxanne Longo RN  Safety Promotion/Fall Prevention:   activity supervised   assistive device/personal items within reach   clutter free environment maintained   fall prevention program maintained   nonskid shoes/slippers when out of bed   room organization consistent   safety round/check completed  Taken 1/23/2025 1925 by Roxanne Longo RN  Safety Promotion/Fall Prevention:   activity supervised   assistive device/personal items within reach   clutter free environment maintained   fall prevention program maintained   nonskid shoes/slippers when out of bed   safety round/check completed   room organization consistent     Problem: Skin Injury Risk Increased  Goal: Skin Health and Integrity  Outcome: Progressing     Problem: Sepsis/Septic Shock  Goal: Optimal Coping  Outcome: Progressing  Goal: Absence of Bleeding  Outcome: Progressing  Goal: Blood Glucose Level Within Target Range  Outcome: Progressing  Goal: Absence of Infection Signs and Symptoms  Outcome: Progressing  Intervention: Initiate Sepsis Management  Recent Flowsheet Documentation  Taken 1/24/2025 0400 by Roxanne Longo, [facility]  Infection Prevention:   hand hygiene promoted   personal protective equipment utilized   rest/sleep promoted   single patient room provided  Taken 1/24/2025 0200 by Roxanne Longo  RN  Infection Prevention:   hand hygiene promoted   personal protective equipment utilized   rest/sleep promoted   single patient room provided  Taken 1/24/2025 0000 by Roxanne Longo RN  Infection Prevention:   hand hygiene promoted   personal protective equipment utilized   rest/sleep promoted   single patient room provided  Taken 1/23/2025 2200 by Roxanne Longo RN  Infection Prevention:   hand hygiene promoted   personal protective equipment utilized   rest/sleep promoted   single patient room provided  Taken 1/23/2025 1925 by Roxanne Longo, RN  Infection Prevention:   hand hygiene promoted   personal protective equipment utilized   rest/sleep promoted   single patient room provided  Goal: Optimal Nutrition Delivery  Outcome: Progressing

## 2025-01-24 NOTE — DISCHARGE SUMMARY
"             [facility] Medicine Services  Discharge Summary    Date of Service: 2025  Patient Name: Nighat Montoya  : 1960  MRN: [id number]    Date of Admission: 2025  Discharge Diagnosis: Acute hypoxemic respiratory failure  Date of Discharge: 2025  Primary Care Physician: Estefani Ashton APRN      Presenting Problem:   Elevated INR [R79.1]  Hypoxia [R09.02]  Acute hypoxemic respiratory failure [J96.01]    Active and Resolved Hospital Problems:  Active Hospital Problems    Diagnosis POA    **Acute hypoxemic respiratory failure [J96.01] Yes      Resolved Hospital Problems   No resolved problems to display.         Hospital Course     HPI:    \"The patient is a 65-year-old female with history of COPD, tobacco dependence, DAVIDE on CPAP, mechanical mitral valve replacement on Coumadin, NICM s/p BiV ICD, rheumatoid arthritis, COPD, cerebellar CVA, diabetes mellitus, chronic constipation and chronic back pain on Norco.     The patient has been having productive yellow cough and shortness of air for about a week thus went to the ED.     Vitals were notable for 88% on room air and was placed on supplemental oxygen.     INR 7.1 and COVID-19 and influenza test are negative.     Review of Systems   All other systems reviewed and are negative.\"    Hospital Course:  Patient continued on prednisone bronchodilators, doxycycline, as well as Mucinex.  Patient's respiratory status continued improved, patient's shortness of breath decreased.  Patient was supratherapeutic at 6.07.  Pharmacy titrated warfarin, INR improved to 3.8.  Patient's goal of 2.5-3.5.  Walk test obtained at discharge it was noted patient to require 3 L of oxygen nasal cannula continuously.  Home oxygen therapy ordered.  Patient is stable and ready for discharge.  Patient to follow-up with pulmonology and primary care doctor in 1 to 2 weeks.  Patient encouraged to monitor INR closely over the weekend and follow-up with her primary " cardiologist at [facility] on Monday.        DISCHARGE Follow Up Recommendations for labs and diagnostics: INR        Day of Discharge     Vital Signs:  Temp:  [96 °F (35.6 °C)-97.9 °F (36.6 °C)] 96 °F (35.6 °C)  Heart Rate:  [] 87  Resp:  [13-27] 15  BP: (110-150)/(48-71) 118/60  Flow (L/min) (Oxygen Therapy):  [1-3] 3    Physical Exam:  Physical Exam  HENT:      Head: Normocephalic.      Nose: Nose normal.   Eyes:      General: No scleral icterus.     Extraocular Movements: Extraocular movements intact.      Pupils: Pupils are equal, round, and reactive to light.   Cardiovascular:      Rate and Rhythm: Normal rate and regular rhythm.   Pulmonary:      Comments: Respiratory effort  Abdominal:      General: Bowel sounds are normal.      Tenderness: There is no abdominal tenderness.   Musculoskeletal:      Cervical back: Normal range of motion. No muscular tenderness.      Comments: Moving all extremities   Lymphadenopathy:      Cervical: No cervical adenopathy.   Skin:     General: Skin is warm.   Neurological:      Mental Status: She is alert and oriented to person, place, and time.   Psychiatric:         Attention and Perception: Attention normal.         Mood and Affect: Mood normal.       Pertinent  and/or Most Recent Results     LAB RESULTS:      Lab 01/24/25  0207 01/23/25  0922 01/23/25  0601 01/22/25  0153 01/21/25  1841 01/21/25  1513 01/21/25  1512 01/21/25  1505   WBC 18.80* 22.20*  --  11.54*  --   --  10.94*  --    HEMOGLOBIN 11.4* 11.7*  --  11.8*  --   --  12.4  --    HEMATOCRIT 36.9 38.6  --  38.3  --   --  41.3  --    PLATELETS 477* 474*  --  395  --   --  444  --    NEUTROS ABS 16.83* 19.73*  --  8.73*  --   --  8.63*  --    IMMATURE GRANS (ABS) 0.14* 0.16*  --  0.10*  --   --  0.06*  --    LYMPHS ABS 1.13 1.48  --  1.70  --   --  1.43  --    MONOS ABS 0.63 0.79  --  0.80  --   --  0.65  --    EOS ABS 0.01 0.00  --  0.13  --   --  0.09  --    MCV 89.6 90.6  --  89.9  --   --  91.2  --     PROCALCITONIN  --   --   --  0.06  --   --   --   --    LACTATE  --   --   --   --  1.1  --  2.2*  --    PROTIME 37.1* 43.3* 46.4* 53.4*  --   --   --  65.1   D DIMER QUANT  --   --   --   --   --  0.26  --   --          Lab 01/23/25  0601 01/22/25  0153 01/21/25  1512   SODIUM 138 138 139   POTASSIUM 5.1 4.2 4.1   CHLORIDE 103 103 104   CO2 27.2 26.5 27.8   ANION GAP 7.8 8.5 7.2   BUN 9 4* 6*   CREATININE 0.56* 0.61 0.68   EGFR 103.3 101.2 98.6   GLUCOSE 180* 158* 241*   CALCIUM 8.9 8.7 9.1   MAGNESIUM 2.2 1.5*  --    PHOSPHORUS  --  3.7  --          Lab 01/23/25  0601 01/22/25  0153 01/21/25  1512   TOTAL PROTEIN 6.6 6.6 6.8   ALBUMIN 3.2* 3.1* 3.3*   GLOBULIN 3.4 3.5 3.5   ALT (SGPT) 19 21 12   AST (SGOT) 27 36* 17   BILIRUBIN <0.2 0.2 0.2   ALK PHOS 79 89 88         Lab 01/24/25  0207 01/23/25  0922 01/23/25  0601 01/22/25  1937 01/22/25  1810 01/22/25  0153 01/21/25  1614 01/21/25  1512 01/21/25  1505   HSTROP T  --   --   --  7 7  --  9 10  --    PROTIME 37.1* 43.3* 46.4*  --   --  53.4*  --   --  65.1   INR 3.80* 4.63* 5.07*  --   --  6.07*  --   --  7.1*                 Brief Urine Lab Results  (Last result in the past 365 days)        Color   Clarity   Blood   Leuk Est   Nitrite   Protein   CREAT   Urine HCG        01/21/25 1753 Yellow   Clear   Trace   Negative   Negative   Negative                 Microbiology Results (last 10 days)       Procedure Component Value - Date/Time    RSV PCR - Swab, Nasopharynx [[id number]]  (Normal) Collected: 01/21/25 2324    Lab Status: Final result Specimen: Swab from Nasopharynx Updated: 01/22/25 0016     RSV, PCR Not Detected    COVID-19 and FLU A/B PCR, 1 HR TAT - Swab, Nasopharynx [[id number]]  (Normal) Collected: 01/21/25 1441    Lab Status: Final result Specimen: Swab from Nasopharynx Updated: 01/21/25 1502     COVID19 Not Detected     Influenza A PCR Not Detected     Influenza B PCR Not Detected    Narrative:      Fact sheet for providers:  https://www.fda.gov/media/205485/download    Fact sheet for patients: https://www.fda.gov/media/173538/download    Test performed by PCR.            CT Chest Without Contrast Diagnostic    Result Date: 1/22/2025  Impression: Impression: 1. Peripheral peribronchial  nodular densities are demonstrated within the bilateral lower lobes and right middle lobe consistent with infectious/inflammatory process 2. Posterior bilateral lower lobe atelectasis. 3. Trace bilateral pleural effusions. 4. Stable mild cardiomegaly with dense coronary calcifications and mitral valve replacement. Correlate with cardiac history. 4. Additional chronic findings as described above. Electronically Signed: Lisa Adams MD  1/22/2025 9:49 AM EST  Workstation ID: [id number]    XR Chest PA & Lateral    Result Date: 1/21/2025  Impression: Impression: Chronic findings without convincing active pulmonary process. Electronically Signed: Ayush Keita MD  1/21/2025 3:33 PM EST  Workstation ID: [id number]             Results for orders placed during the hospital encounter of 01/22/17    Adult Transesophageal Echo    Interpretation Summary  · Left ventricular function is moderately decreased. Estimated EF = 35%. Endocardial border definition is limited on this transthoracic study with limited gastric views. Overall the systolic function appeared to be moderately reduced. Global left ventricular wall motion appears abnormal. The left ventricular cavity is moderately dilated.  · Electronic lead present in the right ventricle.  · Left atrial cavity size is moderately dilated. No evidence of a left atrial thrombus present. There is no spontaneous echo contrast present. The left atrial appendage was visualized through multiple planes, and was found to be multilobar in nature. Doppler interrogation shows mildly reduced flow within the left atrial appendage. The interatrial septum does not appear to be redundant. Lipomatous hypertrophy of the interatrial  septum present. Saline test results are negative.  · An electronic lead is present in the right atrium.  · There is mild thickening of the aortic valve. Trace aortic valve regurgitation is present.  · There is a bileaflet mechanical mitral valve prosthesis present. The prosthetic valve is normal. There is normal closing volume mitral regurgitation noted. There is echo bright area aligned with a mechanical mitral valve leaflets and the left ventricle this is most consistent with artifact. No other independently mobile debris was noted. There was no evidence of vegetation or thrombus..  · There is evidence of previous tricuspid valve repair. The tricuspid valve is slightly sclerotic. The right ventricular pacemaker wire appears to transverse between the leaflet tips. There is trivial tricuspid regurgitation however TR velocity could not be obtained to adequately assess systolic pressure.      Labs Pending at Discharge:  Pending Results       None            Procedures Performed    01/24 0850 Walking Oximetry      Consults:   Consults       Date and Time Order Name Status Description    1/22/2025  5:15 PM Inpatient Cardiology Consult                Discharge Details        Discharge Medications        New Medications        Instructions Start Date   doxycycline 100 MG capsule  Commonly known as: MONODOX   100 mg, Oral, Every 12 Hours      predniSONE 10 MG (48) dose pack  Commonly known as: DELTASONE   Take package as directed             Changes to Medications        Instructions Start Date   Vitamin D (Cholecalciferol) 10 MCG (400 UNIT) tablet  Commonly known as: CHOLECALCIFEROL  What changed: additional instructions   400 Units, 4 Times Daily             Continue These Medications        Instructions Start Date   albuterol (2.5 MG/3ML) 0.083% nebulizer solution  Commonly known as: PROVENTIL   2.5 mg, Every 4 Hours PRN      atorvastatin 40 MG tablet  Commonly known as: LIPITOR   No dose, route, or frequency  recorded.      Diclofenac Sodium 1 % gel gel  Commonly known as: VOLTAREN   4 g, Topical, 4 Times Daily PRN      dicyclomine 20 MG tablet  Commonly known as: BENTYL   20 mg, 2 Times Daily      ferrous sulfate 325 (65 FE) MG tablet   Oral      Fluticasone Furoate-Vilanterol 100-25 MCG/INH inhaler  Commonly known as: BREO ELLIPTA   100 mcg, Daily      furosemide 20 MG tablet  Commonly known as: LASIX   1 tablet, Daily      HYDROcodone-acetaminophen 7.5-325 MG per tablet  Commonly known as: NORCO   1 tablet, Every 8 Hours PRN      levothyroxine 50 MCG tablet  Commonly known as: SYNTHROID, LEVOTHROID   No dose, route, or frequency recorded.      linaclotide 290 MCG capsule capsule  Commonly known as: LINZESS   72 mcg, Oral, Every Morning Before Breakfast      meclizine 12.5 MG tablet  Commonly known as: ANTIVERT   12.5 mg, 2 Times Daily      metFORMIN 500 MG tablet  Commonly known as: GLUCOPHAGE   500 mg, 2 Times Daily With Meals      methocarbamol 500 MG tablet  Commonly known as: ROBAXIN   500 mg, As Needed      metoprolol tartrate 25 MG tablet  Commonly known as: LOPRESSOR   25 mg, Oral, 2 Times Daily      omeprazole 40 MG capsule  Commonly known as: priLOSEC   40 mg      ondansetron ODT 4 MG disintegrating tablet  Commonly known as: ZOFRAN-ODT   No dose, route, or frequency recorded.      potassium chloride 10 MEQ CR tablet   10 mEq      Spiriva Respimat 2.5 MCG/ACT aerosol solution inhaler  Generic drug: tiotropium bromide monohydrate   2 puffs, Inhalation, Daily - RT      sulfaSALAzine 500 MG tablet  Commonly known as: AZULFIDINE   TAKE 1 TABLET BY MOUTH TWICE DAILY      warfarin 2.5 MG tablet  Commonly known as: COUMADIN   4 mg, Every Other Day      zolpidem 10 MG tablet  Commonly known as: AMBIEN   10 mg, Daily               Allergies   Allergen Reactions    Guaifenesin Er Rash    Metronidazole Rash    Budesonide Rash    Clindamycin Rash         Discharge Disposition: Home  Home or Self  Care    Diet:  Hospital:  Diet Order   Procedures    Diet: Cardiac, Diabetic; Healthy Heart (2-3 Na+); Consistent Carbohydrate; Fluid Consistency: Thin (IDDSI 0)         Discharge Activity:         CODE STATUS:  Code Status and Medical Interventions: CPR (Attempt to Resuscitate); Full Support   Ordered at: 01/21/25 1350     Level Of Support Discussed With:    Patient     Code Status (Patient has no pulse and is not breathing):    CPR (Attempt to Resuscitate)     Medical Interventions (Patient has pulse or is breathing):    Full Support         No future appointments.        Time spent on Discharge including face to face service: 30 minutes    Signature: Electronically signed by OCHOA Oro, 01/24/25, 10:37 EST.  [facility] Hospitalist Team

## 2025-01-24 NOTE — CASE MANAGEMENT/SOCIAL WORK
Continued Stay Note   Gary     Patient Name: Nighat Montoya  MRN: [id number]  Today's Date: 1/24/2025    Admit Date: 1/21/2025    Plan: DC PLAN: Routine home with spouse. Current with [facility] (M-F 9-5pm) Home 02 3L thru [place]       Discharge Plan       Row Name 01/24/25 1342       Plan    Plan DC PLAN: Routine home with spouse. Current with [facility] (M-F 9-5pm) Home 02 3L thru [place]        Patient/Family in Agreement with Plan yes    Plan Comments Patient qualifies for home 02 3L. DCP report sent to [facility], message sent to [facility]. Verbalized understanding. Requested information printed and delivered to [facility], Anticipate discharge today once oxygen is delivered.    Received message from [facility] daniels, has already sent patient home with portable oxygen, but states does not accept insuracnce.    Received call from 2D nurse, patient is at home, portable tank running low, Called [facility] will not supply oxygen.    DCP report, facesheet, order, walk test, progress notes faxed to [phone]. Called [facility] and spoke with Daniela explained situation, verbalized understanding. Informed to call back at 1700 to update on call staff. ER  updated. Will follow up.                          Expected Discharge Date and Time       Expected Discharge Date Expected Discharge Time    Jan 24, 2025           Susanne Reich RN   Case Management  [phone]

## 2025-01-24 NOTE — CASE MANAGEMENT/SOCIAL WORK
Continued Stay Note   Gary     Patient Name: Nighat Montoya  MRN: [id number]  Today's Date: 1/24/2025    Admit Date: 1/21/2025    Plan: DC PLAN: Routine home with spouse. Current with [facility] (M-F 9-5pm) Home 02 3L thru [facility]   Discharge Plan       Row Name 01/24/25 0419       Plan    Plan Comments This CM was contacted by [facility], to f/u on home O2. Prev set up with [facility] but they were unable to complete set up due to insurance. [employer] referral sent and required f/u. Contacted [facility] and spoke with Lennie, who states that they did not have her as a patient but could still provide home O2 set up this evening. Updated pt dc nurse, Clarita AMES             Expected Discharge Date and Time       Expected Discharge Date Expected Discharge Time    Jan 24, 2025      Deandra Davis RN    Phone [phone]  Fax [phone]

## 2025-01-24 NOTE — DISCHARGE PLACEMENT REQUEST
"Axel Montoya (64 y.o. Female)       Date of Birth   1960    Social Security Number       Address   [place] [address]    Home Phone   [phone]    MRN   [id number]       [employer]   Ep[employer]    Marital Status                               Admission Date   1/21/25    Admission Type   Urgent    Admitting Provider   Humphrey Abel DO    Attending Provider       Department, Room/Bed   [facility] 2D, 265/1       Discharge Date   1/24/2025    Discharge Disposition   Home or Self Care    Discharge Destination   Home                              Attending Provider: (none)   Allergies: Guaifenesin Er, Metronidazole, Budesonide, Clindamycin    Isolation: None   Infection: None   Code Status: CPR    Ht: 154.9 cm (61\")   Wt: 63.5 kg (139 lb 14.4 oz)    Admission Cmt: None   Principal Problem: Acute hypoxemic respiratory failure [J96.01]                   Active Insurance as of 1/21/2025       Primary Coverage       Payor Plan Insurance Group Employer/Plan Group    HUMANA MEDICARE REPLACEMENT HUMANA MED ADV SNP HMO [id number]       Payor Plan Address Payor Plan Phone Number Payor Plan Fax Number Effective Dates    [address] [address] [phone]  1/1/2024 - None Entered    [place] [place] [facility] [address]         Subscriber Name Subscriber Birth Date Member ID       AXEL MONTOYA 1960 [id number]               Secondary Coverage       Payor Plan Insurance Group Employer/Plan Group    [place]        Payor Plan Address Payor Plan Phone Number Payor Plan Fax Number Effective Dates    [address] [address]   2/12/2020 - None Entered    [place] [address]         Subscriber Name Subscriber Birth Date Member ID       AXEL MONTOYA 1960 [id number]                     Emergency Contacts        (Rel.) Home Phone Work Phone Mobile Phone    LindsayMaxime (Spouse) [phone] -- [phone]    JOSE LUISJOHNNY BARTON (Daughter) -- -- [phone]      " "[address] [place] [address]  Dept. Phone:  [phone]  Dept. Fax:  [phone] Date Ordered: 2025         Patient:  Nighat Montoya MRN:  [id number]   [address] [facility] [place] [address] :  1960  SSN:    Phone: [phone] Sex:  F     Weight: 63.5 kg (139 lb 14.4 oz)         Ht Readings from Last 1 Encounters:   25 154.9 cm (61\")         Oxygen Therapy         (Order ID: [id number])    Diagnosis:  Hypoxia (R09.02 [ICD-10-CM] 799.02 [ICD-9-CM])  Pulmonary edema cardiac cause (I50.1 [ICD-10-CM] 514 [ICD-9-CM])   Quantity:  3     Delivery Modality: Nasal Cannula  Oxygen Flow Rate (LPM): 3  Duration: Continuous  Equipment:  Oxygen Concentrator &  &  Stationary Gaseous Oxygen System & Contents &  Conserving Regulator  Length of Need: 99 Months = Lifetime        Authorizing Provider's Phone: [phone]  Verbal Order Mode: Telephone with readback   Authorizing Provider: Haylie Leslie APRN  Authorizing Provider's NPI: [id number]     Order Entered By: Susanne Reich RN 2025  9:21 AM     Electronically signed by: Haylie Leslie APRN 2025  9:30 AM     Catarina Henley CRT   Respiratory Therapist  Respiratory Therapy     Procedures     Signed     Date of Service: 25 0850  Creation Time: 25 0850  Procedure Orders   Walking Oximetry [[id number]] ordered by Haylie Leslie APRN at 25 0822          Signed         Exercise Oximetry     Patient Name:Nighat Montoya   MRN: [id number]   Date: 25                                                                                                     ROOM AIR BASELINE   SpO2% 86   Heart Rate 87   Blood Pressure       EXERCISE ON ROOM AIR SpO2% EXERCISE ON O2 @ 2 LPM SpO2%   1 MINUTE 86 1 MINUTE 86   2 MINUTES   2 MINUTES       3L 90   3 MINUTES   3 MINUTES        3L 92   4 MINUTES   4 MINUTES     3L 94   5 MINUTES   5 MINUTES       3L 94   6 MINUTES " "  6 MINUTES      3L 95                                                                                                                 Distance Walked   Distance Walked   6 min   Dyspnea (José Miguel Scale)   Dyspnea (José Miguel Scale)   Fatigue (José Miguel Scale)   Fatigue (José Miguel Scale)   SpO2% Post Exercise   SpO2% Post Exercise       95   HR Post Exercise   HR Post Exercise           87   Time to Recovery   Time to Recovery      Comments: pt on ra saturation of 86% o2. Placed pt on 2L with saturation of 86% o2. Placed pt on 3L saturation of 90%. Pt remained on 3L for duration of ambulation with saturation between 92-95% o2. Pt tolerated ambulation well.                Haylie Leslie APRN   Nurse Practitioner  Hospitalist     Discharge Summary      Cosign Needed     Date of Service: 25 103  Creation Time: 25     Cosign Needed       Expand All [facility] All                   [facility] Medicine Services  Discharge Summary     Date of Service: 2025  Patient Name: Nighat Montoya  : 1960  MRN: [id number]     Date of Admission: 2025  Discharge Diagnosis: Acute hypoxemic respiratory failure  Date of Discharge: 2025  Primary Care Physician: Estefani Ashton APRN        Presenting Problem:   Elevated INR [R79.1]  Hypoxia [R09.02]  Acute hypoxemic respiratory failure [J96.01]     Active and Resolved Hospital Problems:       Active Hospital Problems     Diagnosis POA    **Acute hypoxemic respiratory failure [J96.01] Yes       Resolved Hospital Problems   No resolved problems to display.            Hospital Course      HPI:     \"The patient is a 65-year-old female with history of COPD, tobacco dependence, DAVIDE on CPAP, mechanical mitral valve replacement on Coumadin, NICM s/p BiV ICD, rheumatoid arthritis, COPD, cerebellar CVA, diabetes mellitus, chronic constipation and chronic back pain on Norco.     The patient has been having productive yellow cough and shortness of air for about a week " "thus went to the ED.     Vitals were notable for 88% on room air and was placed on supplemental oxygen.     INR 7.1 and COVID-19 and influenza test are negative.     Review of Systems   All other systems reviewed and are negative.\"     Hospital Course:  Patient continued on prednisone bronchodilators, doxycycline, as well as Mucinex.  Patient's respiratory status continued improved, patient's shortness of breath decreased.  Patient was supratherapeutic at 6.07.  Pharmacy titrated warfarin, INR improved to 3.8.  Patient's goal of 2.5-3.5.  Walk test obtained at discharge it was noted patient to require 3 L of oxygen nasal cannula continuously.  Home oxygen therapy ordered.  Patient is stable and ready for discharge.  Patient to follow-up with pulmonology and primary care doctor in 1 to 2 weeks.  Patient encouraged to monitor INR closely over the weekend and follow-up with her primary cardiologist at [facility] on Monday.           DISCHARGE Follow Up Recommendations for labs and diagnostics: INR           Day of Discharge      Vital Signs:  Temp:  [96 °F (35.6 °C)-97.9 °F (36.6 °C)] 96 °F (35.6 °C)  Heart Rate:  [] 87  Resp:  [13-27] 15  BP: (110-150)/(48-71) 118/60  Flow (L/min) (Oxygen Therapy):  [1-3] 3     Physical Exam:  Physical Exam  HENT:      Head: Normocephalic.      Nose: Nose normal.   Eyes:      General: No scleral icterus.     Extraocular Movements: Extraocular movements intact.      Pupils: Pupils are equal, round, and reactive to light.   Cardiovascular:      Rate and Rhythm: Normal rate and regular rhythm.   Pulmonary:      Comments: Respiratory effort  Abdominal:      General: Bowel sounds are normal.      Tenderness: There is no abdominal tenderness.   Musculoskeletal:      Cervical back: Normal range of motion. No muscular tenderness.      Comments: Moving all extremities   Lymphadenopathy:      Cervical: No cervical adenopathy.   Skin:     General: Skin is warm.   Neurological:      Mental " Status: She is alert and oriented to person, place, and time.   Psychiatric:         Attention and Perception: Attention normal.         Mood and Affect: Mood normal.         Pertinent  and/or Most Recent Results      LAB RESULTS:                 Lab 01/24/25  0207 01/23/25  0922 01/23/25  0601 01/22/25  0153 01/21/25  1841 01/21/25  1513 01/21/25  1512 01/21/25  1505   WBC 18.80* 22.20*  --  11.54*  --   --  10.94*  --    HEMOGLOBIN 11.4* 11.7*  --  11.8*  --   --  12.4  --    HEMATOCRIT 36.9 38.6  --  38.3  --   --  41.3  --    PLATELETS 477* 474*  --  395  --   --  444  --    NEUTROS ABS 16.83* 19.73*  --  8.73*  --   --  8.63*  --    IMMATURE GRANS (ABS) 0.14* 0.16*  --  0.10*  --   --  0.06*  --    LYMPHS ABS 1.13 1.48  --  1.70  --   --  1.43  --    MONOS ABS 0.63 0.79  --  0.80  --   --  0.65  --    EOS ABS 0.01 0.00  --  0.13  --   --  0.09  --    MCV 89.6 90.6  --  89.9  --   --  91.2  --    PROCALCITONIN  --   --   --  0.06  --   --   --   --    LACTATE  --   --   --   --  1.1  --  2.2*  --    PROTIME 37.1* 43.3* 46.4* 53.4*  --   --   --  65.1   D DIMER QUANT  --   --   --   --   --  0.26  --   --                 Lab 01/23/25  0601 01/22/25 0153 01/21/25  1512   SODIUM 138 138 139   POTASSIUM 5.1 4.2 4.1   CHLORIDE 103 103 104   CO2 27.2 26.5 27.8   ANION GAP 7.8 8.5 7.2   BUN 9 4* 6*   CREATININE 0.56* 0.61 0.68   EGFR 103.3 101.2 98.6   GLUCOSE 180* 158* 241*   CALCIUM 8.9 8.7 9.1   MAGNESIUM 2.2 1.5*  --    PHOSPHORUS  --  3.7  --                 Lab 01/23/25  0601 01/22/25  0153 01/21/25  1512   TOTAL PROTEIN 6.6 6.6 6.8   ALBUMIN 3.2* 3.1* 3.3*   GLOBULIN 3.4 3.5 3.5   ALT (SGPT) 19 21 12   AST (SGOT) 27 36* 17   BILIRUBIN <0.2 0.2 0.2   ALK PHOS 79 89 88                      Lab 01/24/25  0207 01/23/25  0922 01/23/25  0601 01/22/25  1937 01/22/25  1810 01/22/25  0153 01/21/25  1614 01/21/25  1512 01/21/25  1505   HSTROP T  --   --   --  7 7  --  9 10  --    PROTIME 37.1* 43.3* 46.4*  --   --  53.4*   --   --  65.1   INR 3.80* 4.63* 5.07*  --   --  6.07*  --   --  7.1*                  Brief Urine Lab Results  (Last result in the past 365 days)          Color   Clarity   Blood   Leuk Est   Nitrite   Protein   CREAT   Urine HCG         01/21/25 1753 Yellow    Clear    Trace    Negative    Negative    Negative                          Microbiology Results (last 10 days)         Procedure Component Value - Date/Time     RSV PCR - Swab, Nasopharynx [[id number]]  (Normal) Collected: 01/21/25 2324     Lab Status: Final result Specimen: Swab from Nasopharynx Updated: 01/22/25 0016       RSV, PCR Not Detected     COVID-19 and FLU A/B PCR, 1 HR TAT - Swab, Nasopharynx [[id number]]  (Normal) Collected: 01/21/25 1441     Lab Status: Final result Specimen: Swab from Nasopharynx Updated: 01/21/25 1502       COVID19 Not Detected       Influenza A PCR Not Detected       Influenza B PCR Not Detected     Narrative:       Fact sheet for providers: https://www.fda.gov/media/555244/download     Fact sheet for patients: https://www.fda.gov/media/475966/download     Test performed by PCR.                  Last 30 day radiology impressions   CT Chest Without Contrast Diagnostic     Result Date: 1/22/2025  Impression: Impression: 1. Peripheral peribronchial  nodular densities are demonstrated within the bilateral lower lobes and right middle lobe consistent with infectious/inflammatory process 2. Posterior bilateral lower lobe atelectasis. 3. Trace bilateral pleural effusions. 4. Stable mild cardiomegaly with dense coronary calcifications and mitral valve replacement. Correlate with cardiac history. 4. Additional chronic findings as described above. Electronically Signed: Lisa Adams MD  1/22/2025 9:49 AM EST  Workstation ID: [id number]     XR Chest PA & Lateral     Result Date: 1/21/2025  Impression: Impression: Chronic findings without convincing active pulmonary process. Electronically Signed: Ayush Keita MD  1/21/2025 3:33 PM  EST  Workstation ID: [id number]                     Results for orders placed during the hospital encounter of 01/22/17     Adult Transesophageal Echo     Interpretation Summary  · Left ventricular function is moderately decreased. Estimated EF = 35%. Endocardial border definition is limited on this transthoracic study with limited gastric views. Overall the systolic function appeared to be moderately reduced. Global left ventricular wall motion appears abnormal. The left ventricular cavity is moderately dilated.  · Electronic lead present in the right ventricle.  · Left atrial cavity size is moderately dilated. No evidence of a left atrial thrombus present. There is no spontaneous echo contrast present. The left atrial appendage was visualized through multiple planes, and was found to be multilobar in nature. Doppler interrogation shows mildly reduced flow within the left atrial appendage. The interatrial septum does not appear to be redundant. Lipomatous hypertrophy of the interatrial septum present. Saline test results are negative.  · An electronic lead is present in the right atrium.  · There is mild thickening of the aortic valve. Trace aortic valve regurgitation is present.  · There is a bileaflet mechanical mitral valve prosthesis present. The prosthetic valve is normal. There is normal closing volume mitral regurgitation noted. There is echo bright area aligned with a mechanical mitral valve leaflets and the left ventricle this is most consistent with artifact. No other independently mobile debris was noted. There was no evidence of vegetation or thrombus..  · There is evidence of previous tricuspid valve repair. The tricuspid valve is slightly sclerotic. The right ventricular pacemaker wire appears to transverse between the leaflet tips. There is trivial tricuspid regurgitation however TR velocity could not be obtained to adequately assess systolic pressure.        Labs Pending at Discharge:  Pending Results          None                Procedures Performed     01/24 0850 Walking Oximetry        Consults:   Consults         Date and Time Order Name Status Description     1/22/2025  5:15 PM Inpatient Cardiology Consult                       Discharge Details          Discharge Medications          New Medications         Instructions Start Date   doxycycline 100 MG capsule  Commonly known as: MONODOX    100 mg, Oral, Every 12 Hours        predniSONE 10 MG (48) dose pack  Commonly known as: DELTASONE    Take package as directed                  Changes to Medications         Instructions Start Date   Vitamin D (Cholecalciferol) 10 MCG (400 UNIT) tablet  Commonly known as: CHOLECALCIFEROL  What changed: additional instructions    400 Units, 4 Times Daily                  Continue These Medications         Instructions Start Date   albuterol (2.5 MG/3ML) 0.083% nebulizer solution  Commonly known as: PROVENTIL    2.5 mg, Every 4 Hours PRN        atorvastatin 40 MG tablet  Commonly known as: LIPITOR    No dose, route, or frequency recorded.        Diclofenac Sodium 1 % gel gel  Commonly known as: VOLTAREN    4 g, Topical, 4 Times Daily PRN        dicyclomine 20 MG tablet  Commonly known as: BENTYL    20 mg, 2 Times Daily        ferrous sulfate 325 (65 FE) MG tablet    Oral        Fluticasone Furoate-Vilanterol 100-25 MCG/INH inhaler  Commonly known as: BREO ELLIPTA    100 mcg, Daily        furosemide 20 MG tablet  Commonly known as: LASIX    1 tablet, Daily        HYDROcodone-acetaminophen 7.5-325 MG per tablet  Commonly known as: NORCO    1 tablet, Every 8 Hours PRN        levothyroxine 50 MCG tablet  Commonly known as: SYNTHROID, LEVOTHROID    No dose, route, or frequency recorded.        linaclotide 290 MCG capsule capsule  Commonly known as: LINZESS    72 mcg, Oral, Every Morning Before Breakfast        meclizine 12.5 MG tablet  Commonly known as: ANTIVERT    12.5 mg, 2 Times Daily        metFORMIN 500 MG tablet  Commonly  known as: GLUCOPHAGE    500 mg, 2 Times Daily With Meals        methocarbamol 500 MG tablet  Commonly known as: ROBAXIN    500 mg, As Needed        metoprolol tartrate 25 MG tablet  Commonly known as: LOPRESSOR    25 mg, Oral, 2 Times Daily        omeprazole 40 MG capsule  Commonly known as: priLOSEC    40 mg        ondansetron ODT 4 MG disintegrating tablet  Commonly known as: ZOFRAN-ODT    No dose, route, or frequency recorded.        potassium chloride 10 MEQ CR tablet    10 mEq        Spiriva Respimat 2.5 MCG/ACT aerosol solution inhaler  Generic drug: tiotropium bromide monohydrate    2 puffs, Inhalation, Daily - RT        sulfaSALAzine 500 MG tablet  Commonly known as: AZULFIDINE    TAKE 1 TABLET BY MOUTH TWICE DAILY        warfarin 2.5 MG tablet  Commonly known as: COUMADIN    4 mg, Every Other Day        zolpidem 10 MG tablet  Commonly known as: AMBIEN    10 mg, Daily                     Allergies        Allergies   Allergen Reactions    Guaifenesin Er Rash    Metronidazole Rash    Budesonide Rash    Clindamycin Rash               Discharge Disposition: Home  Home or Self Care     Diet:  Hospital:      Diet Order   Procedures    Diet: Cardiac, Diabetic; Healthy Heart (2-3 Na+); Consistent Carbohydrate; Fluid Consistency: Thin (IDDSI 0)            Discharge Activity:            CODE STATUS:      Code Status and Medical Interventions: CPR (Attempt to Resuscitate); Full Support   Ordered at: 01/21/25 9408     Level Of Support Discussed With:     Patient     Code Status (Patient has no pulse and is not breathing):     CPR (Attempt to Resuscitate)     Medical Interventions (Patient has pulse or is breathing):     Full Support            No future appointments.           Time spent on Discharge including face to face service: 30 minutes     Signature: Electronically signed by OCHOA Oro, 01/24/25, 10:37 EST.  [facility] Hospitalist Team

## 2025-01-24 NOTE — PROCEDURES
Exercise Oximetry    Patient Name:Nighat Montoya   MRN: [id number]   Date: 01/24/25             ROOM AIR BASELINE   SpO2% 86   Heart Rate 87   Blood Pressure      EXERCISE ON ROOM AIR SpO2% EXERCISE ON O2 @ 2 LPM SpO2%   1 MINUTE 86 1 MINUTE 86   2 MINUTES  2 MINUTES       3L 90   3 MINUTES  3 MINUTES        3L 92   4 MINUTES  4 MINUTES     3L 94   5 MINUTES  5 MINUTES       3L 94   6 MINUTES  6 MINUTES      3L 95              Distance Walked   Distance Walked   6 min   Dyspnea (José Miguel Scale)   Dyspnea (José Miguel Scale)   Fatigue (José Miguel Scale)   Fatigue (José Miguel Scale)   SpO2% Post Exercise   SpO2% Post Exercise       95   HR Post Exercise   HR Post Exercise           87   Time to Recovery   Time to Recovery     Comments: pt on ra saturation of 86% o2. Placed pt on 2L with saturation of 86% o2. Placed pt on 3L saturation of 90%. Pt remained on 3L for duration of ambulation with saturation between 92-95% o2. Pt tolerated ambulation well.

## 2025-01-24 NOTE — PROGRESS NOTES
Cardiology Progress Note    Patient Identification:  Name: Nighat Montoya  Age: 64 y.o.  Sex: female  :  1960  MRN: [id number]                 Follow Up / Chief Complaint: Abnormal EKG  Chief Complaint   Patient presents with    Cough    Shortness of Breath    Fever       Interval History: Patient presented with shortness of breath and productive cough and hypoxemia.  Had coagulopathy.  EKG was abnormal therefore cardiology was consulted       Subjective: Patient seen and examined.  Chart reviewed.  Labs reviewed.  Discussed with RN taking care of patient.  Patient denies any chest pain or shortness of breath today.      Objective:  HS troponin normal at 7.  2025: Glucose elevated.  CBC with a white count of 18K, hemoglobin 11.4.    History of present illness:      Ms. Nighat Montoya has PMH of      -Nonischemic cardiomyopathy status post ICD (2015-Medtronic)  -Left bundle branch block  Paroxysmal atrial fibrillation  [id number] SCORE   [id number] Score: 6 (2025  6:49 PM)     Mechanical mitral valve replacement ([facility] 2014)  Tricuspid valve repair with ring 2014  Hypertension  Diabetes  CVA  Previous history of tonsillectomy tubal ligation endometrial ablation  cholecystectomy  Smoker, COPD  DAVIDE     Presented to the emergency room on 2025 with shortness of breath cough she denies any chest pain or fever.  Patient has had a productive cough with yellow sputum for about a week and outlying ED her oxygen was 88% on room air and was provided supplemental oxygen.  Her COVID and flu were negative she does report that her INR was 7.4 at home in the ED it was 7.1 and has gradually decreased to 4.63 today     Other labs in the ED showed negative high-sensitivity troponin CMP unremarkable except elevated glucose AST T36 procalcitonin negative WBC 11.54 increased to 22 today CT chest showed peripheral peribronchial nodular densities bilateral lower lobes right middle lobe  "consistent with infectious or inflammatory process bilateral lower lobe atelectasis and trace bilateral pleural effusions stable mild cardiomegaly with dense coronary calcifications and mitral valve replacement.     Cardiology was consulted for EKG reporting \"acute MI\" however after review of EKG patient is in a paced rhythm with underlying left bundle branch block.     Will have ICD interrogated  Check echocardiogram to assess LV function and valve abnormalities  Hold warfarin until INR is between 2.5 and 3.5          Assessment:  :     Abnormal EKG  Acute hypoxemic respiratory failure  COPD exacerbation, tobacco dependence  Coagulopathy due to Coumadin therapy  Valvular heart disease status post mechanical MVR, tricuspid valve repair with ring, pulmonary hypertension  Paroxysmal atrial fibrillation, long-term warfarin therapy  Left bundle branch block  Dilated cardiomyopathy, history of chronic HFrEF, now well compensated  BiV ICD Medtronic 11/27/2015  History of cerebellar CVA  Diabetes  Hypertension  Dyslipidemia     Recommendations / Plan:              Was called because of EKG machine reading as acute MI.  Patient has paced rhythm and is not acute MI.  Serial troponins are normal at 9--> 7---> 7.  Patient denies any chest pain.  Patient appears euvolemic.  Patient's hypoxic respiratory failure appears to be due to COPD exacerbation.  Patient is getting bronchodilators antibiotics decongestants and steroids.  Counseled on smoking cessation.  Patient has mechanical MVR would require anticoagulation with warfarin keep between 2.5 and 3.5.  Advised patient to follow-up with primary cardiologist Dr. Gavin as outpatient.  Will follow-up as needed.  Please call me back if I can be of any further assistance.    Copied text in this portion of the note has been reviewed and is accurate as of 1/24/2025    Past Medical History:  Past Medical History:   Diagnosis Date    Arthritis     Cardiomyopathy     CHF (congestive " heart failure)     COPD (chronic obstructive pulmonary disease)     Endometriosis     Hypertension     Mitral valve stenosis     Rheumatic fever     Thyroid disorder 2019     Past Surgical History:  Past Surgical History:   Procedure Laterality Date    ABDOMINAL SURGERY      CARDIAC CATHETERIZATION Left 2017    Procedure: Cardiac Catheterization/Vascular Study;  Surgeon: Pino Umaña MD;  Location: [facility] INVASIVE LOCATION;  Service:     CARDIAC SURGERY       SECTION      CHOLECYSTECTOMY      D & C HYSTEROSCOPY WITH NOVASURE ENDOMETRIAL ABLATION AND MYOSURE      MITRAL VALVE REPLACEMENT      OTHER SURGICAL HISTORY      CARDIAC CATH PROCEDURE OUTCOME: SUCCESSFUL    TONSILLECTOMY      TUBAL ABDOMINAL LIGATION      VASCULAR SURGERY          Social History:   Social History     Tobacco Use    Smoking status: Every Day     Current packs/day: 0.00     Average packs/day: 0.5 packs/day for 15.0 years (7.5 ttl pk-yrs)     Types: Cigarettes     Start date: 2000     Last attempt to quit: 2015     Years since quittin.9    Smokeless tobacco: Never   Substance Use Topics    Alcohol use: No      Family History:  Family History   Problem Relation Age of Onset    Lung cancer Father     Lung cancer Brother           Allergies:  Allergies   Allergen Reactions    Guaifenesin Er Rash    Metronidazole Rash    Budesonide Rash    Clindamycin Rash     Scheduled Meds:  atorvastatin, 20 mg, Daily  doxycycline, 100 mg, Q12H  furosemide, 20 mg, Daily  insulin lispro, 2-9 Units, 4x Daily AC & at Bedtime  ipratropium-albuterol, 3 mL, 4x Daily - RT  levothyroxine, 50 mcg, Q AM  lubiprostone, 8 mcg, BID  methylPREDNISolone sodium succinate, 20 mg, Q12H  metoprolol tartrate, 25 mg, BID  pantoprazole, 40 mg, Q AM  sodium chloride, 10 mL, Q12H  sulfaSALAzine, 500 mg, BID          Review of Systems:   ROS  Review of Systems   Constitution: Negative for chills and fever.   Cardiovascular: Negative for chest pain  "and palpitations.   Respiratory: Negative for cough and hemoptysis.    Gastrointestinal: Negative for nausea.        Constitutional:  Temp:  [96 °F (35.6 °C)-97.9 °F (36.6 °C)] 96 °F (35.6 °C)  Heart Rate:  [] 79  Resp:  [13-27] 16  BP: (110-150)/(48-71) 118/60    Physical Exam   /60   Pulse 79   Temp 96 °F (35.6 °C) (Oral)   Resp 16   Ht 154.9 cm (61\")   Wt 63.5 kg (139 lb 14.4 oz)   SpO2 91%   BMI 26.43 kg/m²   General:  Appears in no acute distress  Eyes: Sclera is anicteric,  conjunctiva is clear   HEENT:  No JVD. Thyroid not visibly enlarged. No mucosal pallor or cyanosis  Respiratory: Respirations regular and unlabored at rest.  Clear to auscultation  Cardiovascular: S1,S2 Regular rate and rhythm.   Gastrointestinal: Abdomen nondistended.  Musculoskeletal:  No abnormal movements  Extremities: No digital clubbing or cyanosis  Skin: Color pink.   Neuro: Alert and awake.    INTAKE AND OUTPUT:    Intake/Output Summary (Last 24 hours) at 1/24/2025 0848  Last data filed at 1/24/2025 0535  Gross per 24 hour   Intake 1200 ml   Output --   Net 1200 ml       Cardiographics  Telemetry: Reviewed and interpreted by me reveals sinus    ECG:   ECG 12 Lead Rhythm Change   Preliminary Result   HEART RATE=97  bpm   RR Kglkxxew=165  ms   RI Sqgvniof=018  ms   P Horizontal Axis=-41  deg   P Front Axis=-1  deg   QRSD Ealapvsx=734  ms   QT Ofwrjhoc=004  ms   BSgG=139  ms   QRS Axis=135  deg   T Wave Axis=-26  deg   - ABNORMAL ECG -   Ventricular-paced complexes   Lateral infarct, acute (LAD)   Prolonged QT interval   Date and Time of Study:2025-01-22 16:52:42      ECG 12 Lead   ED Interpretation   Umm Geller APRN     1/21/2025  6:23 PM   ECG 12 Lead         Date/Time: 1/21/2025 2:50 PM      Performed by: Umm Geller APRN   Authorized by: Aric Quintana MD  Interpreted by ED physician   Comparison: compared with previous ECG from 11/12/2023   Similar to previous ECG   Rhythm comments: Atrial sensed " ventricular paced rhythm   Rate: normal   Pacing capture: Atrial sensed ventricular paced rhythm.   Clinical impression: normal ECG      ECG 12 Lead Dyspnea   Final Result   HEART RATE=98  bpm   RR Kskwzfjz=302  ms   IL Zwtuwpip=910  ms   P Horizontal Axis=-43  deg   P Front Axis=19  deg   QRSD Sbgqjotb=079  ms   QT Fjkgngkr=747  ms   NYiK=477  ms   QRS Axis=132  deg   T Wave Axis=-26  deg   - ABNORMAL ECG -   Atrial-sensed ventricular-paced rhythm   Nonspecific  intraventricular conduction delay   When compared with ECG of 12-Nov-2023 09:34:43,   Significant change in rhythm   Electronically Signed By: Aric Quintana (MANDO) 2025-01-21 15:42:42   Date and Time of Study:2025-01-21 14:50:15      Telemetry Scan   Final Result      Telemetry Scan   Final Result      Telemetry Scan   Final Result      Telemetry Scan   Final Result      Telemetry Scan   Final Result      Telemetry Scan   Final Result      Telemetry Scan   Final Result      Telemetry Scan   Final Result      Telemetry Scan   Final Result      Telemetry Scan   Final Result      Telemetry Scan   Final Result      Telemetry Scan   Final Result        I have personally reviewed EKG    Echocardiogram: Results for orders placed during the hospital encounter of 01/22/17    Adult Transesophageal Echo    Interpretation Summary  · Left ventricular function is moderately decreased. Estimated EF = 35%. Endocardial border definition is limited on this transthoracic study with limited gastric views. Overall the systolic function appeared to be moderately reduced. Global left ventricular wall motion appears abnormal. The left ventricular cavity is moderately dilated.  · Electronic lead present in the right ventricle.  · Left atrial cavity size is moderately dilated. No evidence of a left atrial thrombus present. There is no spontaneous echo contrast present. The left atrial appendage was visualized through multiple planes, and was found to be multilobar in nature. Doppler  interrogation shows mildly reduced flow within the left atrial appendage. The interatrial septum does not appear to be redundant. Lipomatous hypertrophy of the interatrial septum present. Saline test results are negative.  · An electronic lead is present in the right atrium.  · There is mild thickening of the aortic valve. Trace aortic valve regurgitation is present.  · There is a bileaflet mechanical mitral valve prosthesis present. The prosthetic valve is normal. There is normal closing volume mitral regurgitation noted. There is echo bright area aligned with a mechanical mitral valve leaflets and the left ventricle this is most consistent with artifact. No other independently mobile debris was noted. There was no evidence of vegetation or thrombus..  · There is evidence of previous tricuspid valve repair. The tricuspid valve is slightly sclerotic. The right ventricular pacemaker wire appears to transverse between the leaflet tips. There is trivial tricuspid regurgitation however TR velocity could not be obtained to adequately assess systolic pressure.      Lab Review   I have reviewed the labs  Results from last 7 days   Lab Units 01/22/25  1937 01/22/25  1810 01/21/25  1614   HSTROP T ng/L 7 7 9     Results from last 7 days   Lab Units 01/23/25  0601   MAGNESIUM mg/dL 2.2     Results from last 7 days   Lab Units 01/23/25  0601   SODIUM mmol/L 138   POTASSIUM mmol/L 5.1   BUN mg/dL 9   CREATININE mg/dL 0.56*   CALCIUM mg/dL 8.9         Results from last 7 days   Lab Units 01/24/25  0207 01/23/25  0922 01/22/25  0153   WBC 10*3/mm3 18.80* 22.20* 11.54*   HEMOGLOBIN g/dL 11.4* 11.7* 11.8*   HEMATOCRIT % 36.9 38.6 38.3   PLATELETS 10*3/mm3 477* 474* 395     Results from last 7 days   Lab Units 01/24/25  0207 01/23/25  0922 01/23/25  0601   INR  3.80* 4.63* 5.07*       RADIOLOGY:  Imaging Results (Last 24 Hours)       ** No results found for the last 24 hours. **                  )1/24/2025  Yoshi Barrera,  "MD JOSEFA Day/Transcription:   \"Dictated utilizing Dragon dictation\".   "

## 2025-01-25 NOTE — OUTREACH NOTE
Prep Survey      Flowsheet Row Responses   [facility]? [facility]   Is LACE score < 7 ? No   Eligibility Readm Mgmt   Discharge diagnosis Acute hypoxemic respiratory failure (   Does the patient have one of the following disease processes/diagnoses(primary or secondary)? Other   Does the patient have Home health ordered? No   Is there a DME ordered? No   Prep survey completed? Yes            PANFILO RANGEL - Registered Nurse

## 2025-01-27 NOTE — CASE MANAGEMENT/SOCIAL WORK
Case Management Discharge Note      Final Note: Routine home         Selected Continued Care - Discharged on 1/24/2025 Admission date: 1/21/2025 - Discharge disposition: Home or Self Care               Transportation Services  Private: Car    Final Discharge Disposition Code: 01 - home or self-care

## 2025-01-29 ENCOUNTER — READMISSION MANAGEMENT (OUTPATIENT)
Dept: CALL CENTER | Facility: HOSPITAL | Age: 65
End: 2025-01-29
Payer: MEDICARE

## 2025-01-29 NOTE — OUTREACH NOTE
Medical Week 1 Survey      Flowsheet Row Responses   [facility] patient discharged from? [facility]   Does the patient have one of the following disease processes/diagnoses(primary or secondary)? Other   Week 1 attempt successful? Yes   Call start time 1304   Call end time 1312   Meds reviewed with patient/caregiver? Yes   Is the patient having any side effects they believe may be caused by any medication additions or changes? No   Does the patient have all medications ordered at discharge? Yes   Is the patient taking all medications as directed (includes completed medication regime)? Yes   Comments regarding appointments PCP appt 01/30/25, pulmonologist on 02/11/25. Cardiologist appt scheduled.   Does the patient have a primary care provider?  Yes   Does the patient have an appointment with their PCP within 7 days of discharge? Yes   Has the patient kept scheduled appointments due by today? N/A   Has home health visited the patient within 72 hours of discharge? N/A   Has all DME been delivered? Yes   DME comments Monitors INR at home. INR 6.4 yesterday, so she skipped last night's dose. Using home O2 at 3L continuous.   Psychosocial issues? No   Did the patient receive a copy of their discharge instructions? Yes   Nursing interventions Reviewed instructions with patient   What is the patient's perception of their health status since discharge? Returned to baseline/stable   Is the patient/caregiver able to teach back signs and symptoms related to disease process for when to call PCP? Yes   Is the patient/caregiver able to teach back signs and symptoms related to disease process for when to call 911? Yes   Is the patient/caregiver able to teach back the hierarchy of who to call/visit for symptoms/problems? PCP, Specialist, Home health nurse, Urgent Care, ED, 911 Yes   If the patient is a current smoker, are they able to teach back resources for cessation? Smoking cessation medications  [Has not smoked since  discharge.]   Week 1 call completed? Yes   Would this patient benefit from a Referral to [facility] Social Work? No   Is the patient interested in additional calls from an ambulatory ? No   Wrap up additional comments Patient states is feeling better, but her INR was 6.4 yesterday. States notified her PCP, who told her to hold coumadin, and will see PCP tomorrow. Denies any fever, chest pain, or worsening SOA. Continues with cough. Denies any needs today.   Call end time 1312            Isabella CEE - Registered Nurse

## 2025-02-02 LAB
QT INTERVAL: 415 MS
QTC INTERVAL: 527 MS

## 2025-02-07 ENCOUNTER — READMISSION MANAGEMENT (OUTPATIENT)
Dept: CALL CENTER | Facility: HOSPITAL | Age: 65
End: 2025-02-07
Payer: MEDICARE

## 2025-02-07 NOTE — OUTREACH NOTE
Medical Week 2 Survey      Flowsheet Row Responses   [facility] patient discharged from? [facility]   Does the patient have one of the following disease processes/diagnoses(primary or secondary)? Other   Week 2 attempt successful? Yes   Call start time 0911   Discharge diagnosis Acute hypoxemic respiratory failure (   Call end time 0915   Is patient permission given to speak with other caregiver? Yes   List who call center can speak with Spouse   Person spoke with today (if not patient) and relationship SPouse   Meds reviewed with patient/caregiver? Yes   Is the patient having any side effects they believe may be caused by any medication additions or changes? No   Does the patient have all medications ordered at discharge? Yes   Is the patient taking all medications as directed (includes completed medication regime)? Yes   Does the patient have a primary care provider?  Yes   Does the patient have an appointment with their PCP within 7 days of discharge? Yes   Has the patient kept scheduled appointments due by today? Yes   Has home health visited the patient within 72 hours of discharge? N/A   DME comments INR 6.2 per Spouse, they have contacted Cardiology office and waiting on return call. O2 sats 90's on RA   Psychosocial issues? No   Did the patient receive a copy of their discharge instructions? Yes   Nursing interventions Reviewed instructions with patient   What is the patient's perception of their health status since discharge? Improving   Is the patient/caregiver able to teach back signs and symptoms related to disease process for when to call PCP? Yes   Is the patient/caregiver able to teach back signs and symptoms related to disease process for when to call 911? Yes   Is the patient/caregiver able to teach back the hierarchy of who to call/visit for symptoms/problems? PCP, Specialist, Home health nurse, Urgent Care, ED, 911 Yes   Week 2 Call Completed? Yes   Is the patient interested in additional calls  from an ambulatory ? No   Would this patient benefit from a Referral to [facility] Social Work? No   Call end time [phone]            Yasmeen T - Registered Nurse

## 2025-02-11 ENCOUNTER — TRANSCRIBE ORDERS (OUTPATIENT)
Dept: ADMINISTRATIVE | Facility: HOSPITAL | Age: 65
End: 2025-02-11
Payer: MEDICARE

## 2025-02-11 DIAGNOSIS — J18.9 PNEUMONIA OF RIGHT LOWER LOBE DUE TO INFECTIOUS ORGANISM: Primary | ICD-10-CM

## 2025-02-17 ENCOUNTER — READMISSION MANAGEMENT (OUTPATIENT)
Dept: CALL CENTER | Facility: HOSPITAL | Age: 65
End: 2025-02-17
Payer: MEDICARE

## 2025-02-18 NOTE — OUTREACH NOTE
Medical Week 3 Survey      Flowsheet Row Responses   [facility] patient discharged from? [facility]   Does the patient have one of the following disease processes/diagnoses(primary or secondary)? Other   Week 3 attempt successful? Yes   Call start time 1949   Call end time 1953   Discharge diagnosis Acute hypoxemic respiratory failure (   Is the patient taking all medications as directed (includes completed medication regime)? Yes   Does the patient have a primary care provider?  Yes   Has the patient kept scheduled appointments due by today? Yes   Psychosocial issues? No   What is the patient's perception of their health status since discharge? Improving   Is the patient/caregiver able to teach back signs and symptoms related to disease process for when to call PCP? Yes   Is the patient/caregiver able to teach back signs and symptoms related to disease process for when to call 911? Yes   Is the patient/caregiver able to teach back the hierarchy of who to call/visit for symptoms/problems? PCP, Specialist, Home health nurse, Urgent Care, ED, 911 Yes   Additional teach back comments States she was trying to wean off oxygen per pulmonary.  States she tried and oxygen levels were dropping and heart rate increased so she had to turn back up.  Advised to notify pulmonary regarding this. Has f/u with cardiology tomorrow.   Week 3 Call Completed? Yes   Graduated/Revoked comments Pt to make sure ozygen levels stay at 90% and above.   Call end time 1953            Shahla DOWNING - Licensed Nurse

## 2025-03-28 ENCOUNTER — HOSPITAL ENCOUNTER (OUTPATIENT)
Dept: CT IMAGING | Facility: HOSPITAL | Age: 65
Discharge: HOME | End: 2025-03-28
Payer: MEDICARE

## 2025-03-28 DIAGNOSIS — J18.9 PNEUMONIA OF RIGHT LOWER LOBE DUE TO INFECTIOUS ORGANISM: ICD-10-CM

## 2025-03-28 PROCEDURE — 71250 CT THORAX DX C-: CPT

## (undated) DEVICE — GW EMR FIX EXCHG J STD .035 3MM 260CM

## (undated) DEVICE — PK CATH CARD 40

## (undated) DEVICE — BND PRESS RADL COMFRT 14IN STRL

## (undated) DEVICE — GLIDESHEATH BASIC HYDROPHILIC COATED INTRODUCER SHEATH: Brand: GLIDESHEATH

## (undated) DEVICE — CATH VENT MIV RADL PIG ST TIP 4F 110CM

## (undated) DEVICE — KT MANIFLD CARDIAC

## (undated) DEVICE — CATH DIAG CARD PERFORMA JL3.5 BT 4F100CM